# Patient Record
Sex: MALE | Race: BLACK OR AFRICAN AMERICAN | Employment: FULL TIME | URBAN - METROPOLITAN AREA
[De-identification: names, ages, dates, MRNs, and addresses within clinical notes are randomized per-mention and may not be internally consistent; named-entity substitution may affect disease eponyms.]

---

## 2017-09-12 ENCOUNTER — OFFICE VISIT (OUTPATIENT)
Dept: FAMILY MEDICINE CLINIC | Facility: CLINIC | Age: 52
End: 2017-09-12

## 2017-09-12 VITALS
TEMPERATURE: 98 F | HEIGHT: 74 IN | WEIGHT: 296 LBS | RESPIRATION RATE: 18 BRPM | SYSTOLIC BLOOD PRESSURE: 125 MMHG | HEART RATE: 60 BPM | BODY MASS INDEX: 37.99 KG/M2 | DIASTOLIC BLOOD PRESSURE: 80 MMHG

## 2017-09-12 DIAGNOSIS — M54.50 DORSALGIA OF LUMBAR REGION: ICD-10-CM

## 2017-09-12 PROCEDURE — 99213 OFFICE O/P EST LOW 20 MIN: CPT | Performed by: FAMILY MEDICINE

## 2017-09-12 PROCEDURE — 99212 OFFICE O/P EST SF 10 MIN: CPT | Performed by: FAMILY MEDICINE

## 2017-09-12 RX ORDER — CYCLOBENZAPRINE HCL 10 MG
10 TABLET ORAL NIGHTLY
Qty: 15 TABLET | Refills: 0 | Status: SHIPPED | OUTPATIENT
Start: 2017-09-12 | End: 2018-08-13

## 2017-09-12 RX ORDER — HYDROCODONE BITARTRATE AND ACETAMINOPHEN 10; 325 MG/1; MG/1
1 TABLET ORAL EVERY 8 HOURS PRN
Qty: 45 TABLET | Refills: 0 | Status: SHIPPED | OUTPATIENT
Start: 2017-09-12 | End: 2018-12-10

## 2017-09-12 NOTE — PROGRESS NOTES
HPI:    Patient ID: Lynn Holcomb is a 46year old male. Patient is here for follow up for chronic back pains. Pt has been working with Dr Mikaela Taylor and pain clinic for this. The patient is compliant with medications and no side effects.  There are no symptoms. No orders of the defined types were placed in this encounter.       Meds This Visit:  Signed Prescriptions Disp Refills    HYDROcodone-acetaminophen  MG Oral Tab 45 tablet 0      Sig: Take 1 tablet by mouth every 8 (eight) hours as need

## 2017-12-04 ENCOUNTER — OFFICE VISIT (OUTPATIENT)
Dept: FAMILY MEDICINE CLINIC | Facility: CLINIC | Age: 52
End: 2017-12-04

## 2017-12-04 VITALS
RESPIRATION RATE: 20 BRPM | WEIGHT: 299 LBS | BODY MASS INDEX: 38 KG/M2 | SYSTOLIC BLOOD PRESSURE: 157 MMHG | DIASTOLIC BLOOD PRESSURE: 95 MMHG | HEART RATE: 80 BPM | TEMPERATURE: 98 F

## 2017-12-04 DIAGNOSIS — K21.9 GASTROESOPHAGEAL REFLUX DISEASE, ESOPHAGITIS PRESENCE NOT SPECIFIED: ICD-10-CM

## 2017-12-04 DIAGNOSIS — L91.8 SKIN TAGS, MULTIPLE ACQUIRED: ICD-10-CM

## 2017-12-04 DIAGNOSIS — M54.50 DORSALGIA OF LUMBAR REGION: Primary | ICD-10-CM

## 2017-12-04 PROCEDURE — 99212 OFFICE O/P EST SF 10 MIN: CPT | Performed by: FAMILY MEDICINE

## 2017-12-04 PROCEDURE — 99213 OFFICE O/P EST LOW 20 MIN: CPT | Performed by: FAMILY MEDICINE

## 2017-12-04 RX ORDER — OMEPRAZOLE 40 MG/1
40 CAPSULE, DELAYED RELEASE ORAL DAILY
Qty: 90 CAPSULE | Refills: 1 | Status: SHIPPED | OUTPATIENT
Start: 2017-12-04 | End: 2018-08-13

## 2017-12-04 NOTE — PROGRESS NOTES
HPI:    Patient ID: Kesha Shaw is a 46year old male. Patient is here for follow up for chronic medical issues- GERD. The patient is compliant with medications and no side effects.  There are no acute issues and patient is requesting refills on his m orthopedic MD for further evaluation and treatment and a second opinion on ; To call if any significant symptoms.      Gastroesophageal reflux disease, esophagitis presence not specified:  - After discussion, will send to GI for further evaluation and treat

## 2017-12-05 ENCOUNTER — TELEPHONE (OUTPATIENT)
Dept: ADMINISTRATIVE | Age: 52
End: 2017-12-05

## 2017-12-05 NOTE — TELEPHONE ENCOUNTER
Pt dropped off FMLA form for Dr. Phil Brown  @[employer] + [employer]+ Signed release, pt will call for payment. Logged for processing.  NK

## 2017-12-11 NOTE — TELEPHONE ENCOUNTER
Good afternoon Dr. Joyce Cook form pending in [employer]. Patient is requesting intermittent time off, 1-5 days per month for back pain, do you approve? Please advise.     Thanks  Atilio Almonte

## 2017-12-12 NOTE — TELEPHONE ENCOUNTER
Dr. Jeniffer Madden    Please sign off on form:  -Highlight the patient and hit \"Chart\" button. -In Chart Review, w/in the Encounter tab - open the Telephone call encounter for 12-5-17. Scroll down.  -Click \"scan on\" blue Hyperlink under \"Media\" heading for PPD_FMLA_. [id number].  -Click on Acknowledge button at the bottom right corner and left-click onto image, signature stamp appears and drag signature to Provider signature line. Stamp will turn blue. Close window.     Thank you,  Alisa Harrisr

## 2018-02-07 ENCOUNTER — OFFICE VISIT (OUTPATIENT)
Dept: PAIN CLINIC | Facility: HOSPITAL | Age: 53
End: 2018-02-07
Attending: PHYSICIAN ASSISTANT
Payer: COMMERCIAL

## 2018-02-07 VITALS
HEIGHT: 74 IN | BODY MASS INDEX: 37.09 KG/M2 | HEART RATE: 76 BPM | DIASTOLIC BLOOD PRESSURE: 91 MMHG | WEIGHT: 289 LBS | SYSTOLIC BLOOD PRESSURE: 142 MMHG

## 2018-02-07 DIAGNOSIS — M47.816 SPONDYLOSIS OF LUMBAR SPINE: Primary | ICD-10-CM

## 2018-02-07 DIAGNOSIS — M51.36 DDD (DEGENERATIVE DISC DISEASE), LUMBAR: ICD-10-CM

## 2018-02-07 PROCEDURE — 99201 HC OUTPT EVAL AND MGNT NEW PT LEVEL 1: CPT

## 2018-02-07 NOTE — PROGRESS NOTES
PT REPORTS SEVERAL YR HX OF INJURY-15YRS AGO;  WORKING SHIPPING & RECEIVING;  OVER THE YRS  PAIN HAS  HAS GOTTEN WORSE;  ALWAYS HAVING PAIN;  REFERRED BY [facility];  HAS HAD P.T.  IN THE PAST;  PAIN FLARES OVER THE YRS;  ABLE TO TOLERATE 4/10;  PAIN

## 2018-02-07 NOTE — CHRONIC PAIN
[facility]  Pain Consultation     HISTORY OF PRESENT ILLNESS:  Ronnald Canavan is a 48year old old male referred to the pain clinic by Dr. Deann Tracy for Spondylosis of lumbar spine  (primary encounter diagnosis)  DDD (degenerative disc diseas not exacerbate the pain.   Numbness/tingling: as above  Weakness: as above  Weight Loss: Negative   Fever: Negative   Cardiovascular:  No current chest pain or palpitations   Respiratory:  No current shortness of breath   Gastrointestinal:  No active ulcer, normal to touch bilateral upper and lower extremities  Edema - Absent  Sensation (light touch/pinprick/temperature):     Right Lower Extremity:  Normal  Left Lower Extremity: Normal  ROM:                                                               MOTOR mild bilateral neural foraminal narrowing.   L4-L5:   Diffuse disc bulge and facet degenerative changes causing flattening of the ventral thecal sac, mild bilateral lateral recess stenosis and mild bilateral (right greater than left) neural foraminal narrow planned.     Pt will return to clinic for 2 weeks postinjection

## 2018-02-22 ENCOUNTER — OFFICE VISIT (OUTPATIENT)
Dept: PAIN CLINIC | Facility: HOSPITAL | Age: 53
End: 2018-02-22
Attending: ANESTHESIOLOGY
Payer: COMMERCIAL

## 2018-02-22 VITALS — DIASTOLIC BLOOD PRESSURE: 90 MMHG | HEART RATE: 72 BPM | SYSTOLIC BLOOD PRESSURE: 145 MMHG

## 2018-02-22 DIAGNOSIS — G89.29 CHRONIC MIDLINE LOW BACK PAIN WITHOUT SCIATICA: Primary | ICD-10-CM

## 2018-02-22 DIAGNOSIS — M47.817 FACET ARTHRITIS OF LUMBOSACRAL REGION: ICD-10-CM

## 2018-02-22 DIAGNOSIS — M54.50 CHRONIC MIDLINE LOW BACK PAIN WITHOUT SCIATICA: Primary | ICD-10-CM

## 2018-02-22 PROCEDURE — 99211 OFF/OP EST MAY X REQ PHY/QHP: CPT

## 2018-02-22 NOTE — PROGRESS NOTES
Patient presents to [facility] ambulatory for follow up LESI done 2-14 by Dr. Adair Liao. He reports for a few days after the injection he got 30-40% relief but now the pain has returned in full.   He takes norco as needed that he gets from his PCP but now is out, it h

## 2018-02-22 NOTE — CHRONIC PAIN
Follow-up Note  HISTORY OF PRESENT ILLNESS:  Quinton Reid is a 48year old old male, originally referred to the pain clinic by  No ref. provider found, returns to the clinic for follow up. He is s/p LESI with 2-3 days of 20-30% relief.  He continues to HISTORY:  Patient Active Problem List:     Glaucoma suspect of both eyes     Age related cataract     Myopia with astigmatism and presbyopia     Chronic allergic conjunctivitis    Past Medical History:   Diagnosis Date   • Essential hypertension        [facility], [address] [place] [address], 6/18/2012, 12:45. INDICATIONS:  Chronic lower back pain, no known injury, no surgery.      TECHNIQUE:    A variety of imaging planes and parameters were utilized for visualization of suspected pa neural foraminal narrowing. 2. Other levels as described above. [place] PHYSICIAN MONITORING PROGRAM REVIEWED  Yes      ASSESSMENT AND PLAN:    Laxmi Monreal is a 48year old  male, with  Low back pain secondary to DDD and facet arthropathy.  He is wit

## 2018-03-21 ENCOUNTER — OFFICE VISIT (OUTPATIENT)
Dept: PAIN CLINIC | Facility: HOSPITAL | Age: 53
End: 2018-03-21
Attending: ANESTHESIOLOGY
Payer: COMMERCIAL

## 2018-03-21 VITALS — DIASTOLIC BLOOD PRESSURE: 97 MMHG | SYSTOLIC BLOOD PRESSURE: 156 MMHG | HEART RATE: 69 BPM

## 2018-03-21 DIAGNOSIS — M54.50 CHRONIC MIDLINE LOW BACK PAIN WITHOUT SCIATICA: Primary | ICD-10-CM

## 2018-03-21 DIAGNOSIS — G89.29 CHRONIC MIDLINE LOW BACK PAIN WITHOUT SCIATICA: Primary | ICD-10-CM

## 2018-03-21 DIAGNOSIS — M51.36 DDD (DEGENERATIVE DISC DISEASE), LUMBAR: ICD-10-CM

## 2018-03-21 DIAGNOSIS — M47.817 FACET ARTHRITIS OF LUMBOSACRAL REGION: ICD-10-CM

## 2018-03-21 PROCEDURE — 99211 OFF/OP EST MAY X REQ PHY/QHP: CPT | Performed by: NURSE PRACTITIONER

## 2018-03-21 RX ORDER — HYDROCODONE BITARTRATE AND ACETAMINOPHEN 5; 325 MG/1; MG/1
1 TABLET ORAL EVERY 8 HOURS PRN
Qty: 30 TABLET | Refills: 0 | Status: SHIPPED | OUTPATIENT
Start: 2018-03-21 | End: 2018-08-08

## 2018-03-21 NOTE — CHRONIC PAIN
Follow-up Note  HISTORY OF PRESENT ILLNESS:  Shant Muhammad is a 48year old old male, returns to the clinic for follow up. He is s/p median branch blocks bilaterally with 1 day of relief where his pain was a 2 or a 3.   He continues to report low back pain HISTORY:  Patient Active Problem List:     Glaucoma suspect of both eyes     Age related cataract     Myopia with astigmatism and presbyopia     Chronic allergic conjunctivitis     Chronic midline low back pain without sciatica     Facet arthritis of lumbo Absent  TPs: Absent: lumbo-sacral paraspinous muscle TPs  Skin - normal     IMAGING:  PROCEDURE:  MRI SPINE LUMBAR (CPT=72148)     COMPARISON: [facility] [address] [place], 6/18/2012, 12:45.      INDICATIONS:  Chronic lo progressed since prior, most prominent at L4-5 with mild flattening of the ventral thecal sac, mild bilateral lateral recess stenosis and mild bilateral neural foraminal narrowing. 2. Other levels as described above.          [address] [facility]

## 2018-04-04 NOTE — TELEPHONE ENCOUNTER
Patient requesting refill for     AmLODIPine Besylate 5 MG Oral Tab Take 1 tablet (5 mg total) by mouth once daily. Disp: 90 tablet Rfl: 3     Patient is out of medication.      Please advise

## 2018-04-05 RX ORDER — AMLODIPINE BESYLATE 5 MG/1
5 TABLET ORAL
Qty: 90 TABLET | Refills: 3 | Status: SHIPPED | OUTPATIENT
Start: 2018-04-05 | End: 2019-05-12

## 2018-04-05 NOTE — TELEPHONE ENCOUNTER
Message noted: Chart reviewed and may refill medication times one 90 day supply as requested with 3 additional refill. Prescription sent to listed pharmacy. Pharmacy to notify patient.

## 2018-04-05 NOTE — TELEPHONE ENCOUNTER
Requesting Amlodipine refill    Failed IM/FM refill protocol, please advise    Hypertensive Medications  Protocol Criteria:  · Appointment scheduled in the past 6 months or in the next 3 months  · BMP or CMP in the past 12 months  · Creatinine result < 2

## 2018-08-06 ENCOUNTER — TELEPHONE (OUTPATIENT)
Dept: FAMILY MEDICINE CLINIC | Facility: CLINIC | Age: 53
End: 2018-08-06

## 2018-08-06 DIAGNOSIS — M54.5 CHRONIC LOW BACK PAIN, UNSPECIFIED BACK PAIN LATERALITY, WITH SCIATICA PRESENCE UNSPECIFIED: Primary | ICD-10-CM

## 2018-08-06 DIAGNOSIS — G89.29 CHRONIC LOW BACK PAIN, UNSPECIFIED BACK PAIN LATERALITY, WITH SCIATICA PRESENCE UNSPECIFIED: Primary | ICD-10-CM

## 2018-08-06 NOTE — TELEPHONE ENCOUNTER
Referral request noted. Referral approved, generated and sent to [facility].

## 2018-08-08 ENCOUNTER — OFFICE VISIT (OUTPATIENT)
Dept: FAMILY MEDICINE CLINIC | Facility: CLINIC | Age: 53
End: 2018-08-08
Payer: COMMERCIAL

## 2018-08-08 VITALS
TEMPERATURE: 98 F | WEIGHT: 290 LBS | HEIGHT: 74 IN | HEART RATE: 73 BPM | BODY MASS INDEX: 37.22 KG/M2 | SYSTOLIC BLOOD PRESSURE: 147 MMHG | DIASTOLIC BLOOD PRESSURE: 93 MMHG | RESPIRATION RATE: 18 BRPM

## 2018-08-08 DIAGNOSIS — M54.5 CHRONIC LOW BACK PAIN, UNSPECIFIED BACK PAIN LATERALITY, WITH SCIATICA PRESENCE UNSPECIFIED: Primary | ICD-10-CM

## 2018-08-08 DIAGNOSIS — G89.29 CHRONIC LOW BACK PAIN, UNSPECIFIED BACK PAIN LATERALITY, WITH SCIATICA PRESENCE UNSPECIFIED: Primary | ICD-10-CM

## 2018-08-08 PROCEDURE — 99212 OFFICE O/P EST SF 10 MIN: CPT | Performed by: FAMILY MEDICINE

## 2018-08-08 PROCEDURE — 99213 OFFICE O/P EST LOW 20 MIN: CPT | Performed by: FAMILY MEDICINE

## 2018-08-08 RX ORDER — HYDROCODONE BITARTRATE AND ACETAMINOPHEN 5; 325 MG/1; MG/1
1 TABLET ORAL EVERY 8 HOURS PRN
Qty: 30 TABLET | Refills: 0 | Status: ON HOLD | OUTPATIENT
Start: 2018-08-08 | End: 2019-04-26

## 2018-08-08 NOTE — PROGRESS NOTES
HPI:    Patient ID: Carissa Estrada is a 48year old male. Patient is here for follow up for chronic back issues.  There are no acute issues and patient is requesting refills on his norco. The patient states medications have been helpful and effective in types were placed in this encounter. Meds This Visit:  Signed Prescriptions Disp Refills    HYDROcodone-acetaminophen (NORCO) 5-325 MG Oral Tab 30 tablet 0      Sig: Take 1 tablet by mouth every 8 (eight) hours as needed for Pain.            Imaging &

## 2018-08-13 ENCOUNTER — OFFICE VISIT (OUTPATIENT)
Dept: PAIN CLINIC | Facility: HOSPITAL | Age: 53
End: 2018-08-13
Attending: FAMILY MEDICINE
Payer: COMMERCIAL

## 2018-08-13 DIAGNOSIS — M54.50 CHRONIC MIDLINE LOW BACK PAIN WITHOUT SCIATICA: Primary | ICD-10-CM

## 2018-08-13 DIAGNOSIS — G89.29 CHRONIC LOW BACK PAIN, UNSPECIFIED BACK PAIN LATERALITY, WITH SCIATICA PRESENCE UNSPECIFIED: ICD-10-CM

## 2018-08-13 DIAGNOSIS — G89.29 CHRONIC MIDLINE LOW BACK PAIN WITHOUT SCIATICA: Primary | ICD-10-CM

## 2018-08-13 DIAGNOSIS — M54.5 CHRONIC LOW BACK PAIN, UNSPECIFIED BACK PAIN LATERALITY, WITH SCIATICA PRESENCE UNSPECIFIED: ICD-10-CM

## 2018-08-13 PROCEDURE — 99211 OFF/OP EST MAY X REQ PHY/QHP: CPT

## 2018-08-13 NOTE — PROGRESS NOTES
Patient presents to [facility] ambulatory for follow up. He has chronic low back pain with intermittently numbness and tingling in his feet. He had 2 medial branch blocks earlier in 2018 and they helped 80% for 2-3 days.  He has been taking norco prn from dr Roland Reyes

## 2018-08-13 NOTE — CHRONIC PAIN
Follow-up Note  CC: low back pain  HISTORY OF PRESENT ILLNESS:  Karen Gill is a 48year old old male, originally referred to the pain clinic by Dr. Parker Muhammad, with history of Chronic midline low back pain without sciatica  (primary encounter diagnosis)  Yrn Negative  Integumentary :  Negative  Psychiatric:  Negative  Hematologic: No active bleeding  Lymphatic: No current lymphedema  Allergic/Immunologic:  Negative  Musculoskeletal: As above  Neurological: As above  Denies chest pain, shortness of breath.     [place] JVD  Gait: Normal;  cane user - No  Spine: Kyphosis    ROM:   LUMBAR SPINE    Degree Pain   Flexion 90 yes   Extension 30 yes   Left SB 30 Little bit   Right SB 30 Little bit               KNEES    Degree Pain   Right Flexion 120 No   Right Extension 0 No

## 2018-08-14 ENCOUNTER — DOCUMENTATION ONLY (OUTPATIENT)
Dept: PAIN CLINIC | Facility: HOSPITAL | Age: 53
End: 2018-08-14

## 2018-08-15 ENCOUNTER — DOCUMENTATION ONLY (OUTPATIENT)
Dept: PAIN CLINIC | Facility: HOSPITAL | Age: 53
End: 2018-08-15

## 2018-09-18 ENCOUNTER — OFFICE VISIT (OUTPATIENT)
Dept: PAIN CLINIC | Facility: HOSPITAL | Age: 53
End: 2018-09-18
Attending: NURSE PRACTITIONER
Payer: COMMERCIAL

## 2018-09-18 DIAGNOSIS — G89.29 CHRONIC MIDLINE LOW BACK PAIN WITHOUT SCIATICA: Primary | ICD-10-CM

## 2018-09-18 DIAGNOSIS — M47.817 FACET ARTHRITIS OF LUMBOSACRAL REGION: ICD-10-CM

## 2018-09-18 DIAGNOSIS — M54.50 CHRONIC MIDLINE LOW BACK PAIN WITHOUT SCIATICA: Primary | ICD-10-CM

## 2018-09-18 PROCEDURE — 99211 OFF/OP EST MAY X REQ PHY/QHP: CPT

## 2018-09-18 NOTE — CHRONIC PAIN
Follow-up Note  CC: S/P Bilateral RFA rhizotomy 8/30/18  HISTORY OF PRESENT ILLNESS:  Sveta Baer is a 48year old old male, originally referred to the pain clinic by Dr. Elfego Eduardo, with history of Chronic midline low back pain without sciatica  (primary enco shortness of breath   Gastrointestinal:  No active ulcer  Genitourinary:  Negative  Integumentary :  Negative  Psychiatric:  Negative  Hematologic: No active bleeding  Lymphatic: No current lymphedema  Allergic/Immunologic:  Negative  Musculoskeletal: As a Exposure: Not Asked        Hobby Hazards: Not Asked        Sleep Concern: Not Asked        Stress Concern: Not Asked        Weight Concern: Not Asked        Special Diet: Not Asked        Back Care: Not Asked        Exercise: Not Asked        Bike Helmet: HEP      Comprehensive analgesic plan was formulated. Conservative vs. Aggressive measures were discussed at length including pharmacotherapy (eg.  Anti- inflammatories, muscle relaxants, neuropathic medications, oral steroids, analgesics), injections, and

## 2018-10-04 ENCOUNTER — APPOINTMENT (OUTPATIENT)
Dept: PHYSICAL THERAPY | Facility: HOSPITAL | Age: 53
End: 2018-10-04
Attending: NURSE PRACTITIONER
Payer: COMMERCIAL

## 2018-10-08 ENCOUNTER — APPOINTMENT (OUTPATIENT)
Dept: PHYSICAL THERAPY | Facility: HOSPITAL | Age: 53
End: 2018-10-08
Attending: NURSE PRACTITIONER
Payer: COMMERCIAL

## 2018-10-11 ENCOUNTER — APPOINTMENT (OUTPATIENT)
Dept: PHYSICAL THERAPY | Facility: HOSPITAL | Age: 53
End: 2018-10-11
Attending: NURSE PRACTITIONER
Payer: COMMERCIAL

## 2018-10-15 ENCOUNTER — APPOINTMENT (OUTPATIENT)
Dept: PHYSICAL THERAPY | Facility: HOSPITAL | Age: 53
End: 2018-10-15
Attending: NURSE PRACTITIONER
Payer: COMMERCIAL

## 2018-10-18 ENCOUNTER — APPOINTMENT (OUTPATIENT)
Dept: PHYSICAL THERAPY | Facility: HOSPITAL | Age: 53
End: 2018-10-18
Attending: NURSE PRACTITIONER
Payer: COMMERCIAL

## 2018-12-10 ENCOUNTER — OFFICE VISIT (OUTPATIENT)
Dept: FAMILY MEDICINE CLINIC | Facility: CLINIC | Age: 53
End: 2018-12-10
Payer: COMMERCIAL

## 2018-12-10 VITALS
TEMPERATURE: 99 F | BODY MASS INDEX: 38.12 KG/M2 | SYSTOLIC BLOOD PRESSURE: 155 MMHG | HEART RATE: 77 BPM | DIASTOLIC BLOOD PRESSURE: 98 MMHG | HEIGHT: 74 IN | WEIGHT: 297 LBS

## 2018-12-10 DIAGNOSIS — M54.50 DORSALGIA OF LUMBAR REGION: Primary | ICD-10-CM

## 2018-12-10 DIAGNOSIS — Z12.11 COLON CANCER SCREENING: ICD-10-CM

## 2018-12-10 DIAGNOSIS — M51.26 LUMBAR HERNIATED DISC: ICD-10-CM

## 2018-12-10 PROCEDURE — 99212 OFFICE O/P EST SF 10 MIN: CPT | Performed by: FAMILY MEDICINE

## 2018-12-10 PROCEDURE — 99213 OFFICE O/P EST LOW 20 MIN: CPT | Performed by: FAMILY MEDICINE

## 2018-12-10 RX ORDER — HYDROCODONE BITARTRATE AND ACETAMINOPHEN 10; 325 MG/1; MG/1
1 TABLET ORAL EVERY 8 HOURS PRN
Qty: 60 TABLET | Refills: 0 | Status: ON HOLD | OUTPATIENT
Start: 2018-12-10 | End: 2019-04-02

## 2018-12-10 NOTE — PROGRESS NOTES
HPI:    Patient ID: Cony Al is a 48year old male. Patient is here for follow up for recurring back issues. The patient states he has had some flare up for his lower back pains.  There are no acute issues and patient is requesting refills for his Disp Refills   • HYDROcodone-acetaminophen  MG Oral Tab 60 tablet 0     Sig: Take 1 tablet by mouth every 8 (eight) hours as needed for Pain.        Imaging & Referrals:  NEUROSURGERY - INTERNAL  GASTRO - INTERNAL       FR#[id number]

## 2018-12-12 NOTE — H&P
[address] [facility] [address] Gastroenterology                                                                                                  Clinic History and Physical     Pa tobacco: Never Used    Alcohol use:  Yes      Alcohol/week: 0.6 oz      Types: 1 Glasses of wine per week    Drug use: No       Medications (Active prior to today's visit):    Current Outpatient Medications:  HYDROcodone-acetaminophen (NORCO) 5-325 MG Oral movements of all 4 extremities   Psych: Pt has a normal mood and affect, behavior is normal    Nursing note and vitals reviewed      Labs/Imaging:     Patient's labs and imaging were reviewed and discussed with patient today.   See HPI and A&P for further d proceed with colonoscopy with intervention [i.e. polypectomy, stent placement, etc.] as indicated. Orders This Visit:  No orders of the defined types were placed in this encounter.       Meds This Visit:  Requested Prescriptions     Pending Prescriptio

## 2019-01-07 ENCOUNTER — OFFICE VISIT (OUTPATIENT)
Dept: GASTROENTEROLOGY | Facility: CLINIC | Age: 54
End: 2019-01-07
Payer: COMMERCIAL

## 2019-01-07 ENCOUNTER — TELEPHONE (OUTPATIENT)
Dept: GASTROENTEROLOGY | Facility: CLINIC | Age: 54
End: 2019-01-07

## 2019-01-07 VITALS
BODY MASS INDEX: 37.68 KG/M2 | SYSTOLIC BLOOD PRESSURE: 145 MMHG | WEIGHT: 293.63 LBS | HEIGHT: 74 IN | DIASTOLIC BLOOD PRESSURE: 85 MMHG | HEART RATE: 83 BPM

## 2019-01-07 DIAGNOSIS — Z12.11 SCREENING FOR COLON CANCER: Primary | ICD-10-CM

## 2019-01-07 DIAGNOSIS — Z12.11 SCREEN FOR COLON CANCER: Primary | ICD-10-CM

## 2019-01-07 PROCEDURE — S0285 CNSLT BEFORE SCREEN COLONOSC: HCPCS | Performed by: NURSE PRACTITIONER

## 2019-01-07 PROCEDURE — 99212 OFFICE O/P EST SF 10 MIN: CPT | Performed by: NURSE PRACTITIONER

## 2019-01-07 NOTE — TELEPHONE ENCOUNTER
Scheduled for:  Colonoscopy [address]388  Provider Name:   Date:  3/12/19  Location:  [facility]  Sedation:  MAC  Time:  8am arrival 7am  Prep:Colyte   Meds/Allergies Reconciled?:  Cas AIKEN reviewed  Diagnosis with codes: Screen Z12.11   Was patient info

## 2019-01-07 NOTE — PATIENT INSTRUCTIONS
1. Schedule colonoscopy with Dr. Shawna Vega or Dr. James Murillo w/ MAC               2.  bowel prep from pharmacy - split dose Colyte    3. Continue all medications for procedure except: see office visit note    4.  Read all bowel prep instructions carefu

## 2019-01-11 ENCOUNTER — TELEPHONE (OUTPATIENT)
Dept: GASTROENTEROLOGY | Facility: CLINIC | Age: 54
End: 2019-01-11

## 2019-01-11 DIAGNOSIS — Z12.11 COLON CANCER SCREENING: Primary | ICD-10-CM

## 2019-01-11 NOTE — TELEPHONE ENCOUNTER
Spoke to pt and pt still wants to cancel procedure for  3/12/19    I canceled in [facility], sent a Surgical Case Change Request, forwarded to GI Schedulers.     ALEXANDRIA Cadet

## 2019-01-14 NOTE — TELEPHONE ENCOUNTER
Called pt and rescheduled Clonoscopy for 4/2/19     Scheduled for:  Colonoscopy [id number]  Provider Name:  Date:  4/2/19  Location:  [facility]  Sedation:  MAC  Time:  8AM ARRIVAL 7AM  Prep:Colyte  Meds/Allergies Reconciled?:  Vaishnavi Reyes Reviewed  Diagnosis

## 2019-01-14 NOTE — TELEPHONE ENCOUNTER
Pt called and would like to know if his cancelled procedure has been filled?  He would like to reschedule to that date in march if possible please call thank you

## 2019-04-02 ENCOUNTER — ANESTHESIA (OUTPATIENT)
Dept: ENDOSCOPY | Facility: HOSPITAL | Age: 54
End: 2019-04-02
Payer: COMMERCIAL

## 2019-04-02 ENCOUNTER — HOSPITAL ENCOUNTER (OUTPATIENT)
Facility: HOSPITAL | Age: 54
Setting detail: HOSPITAL OUTPATIENT SURGERY
Discharge: HOME | End: 2019-04-02
Attending: INTERNAL MEDICINE | Admitting: INTERNAL MEDICINE
Payer: COMMERCIAL

## 2019-04-02 ENCOUNTER — ANESTHESIA EVENT (OUTPATIENT)
Dept: ENDOSCOPY | Facility: HOSPITAL | Age: 54
End: 2019-04-02
Payer: COMMERCIAL

## 2019-04-02 DIAGNOSIS — Z12.11 COLON CANCER SCREENING: ICD-10-CM

## 2019-04-02 PROCEDURE — 45380 COLONOSCOPY AND BIOPSY: CPT | Performed by: INTERNAL MEDICINE

## 2019-04-02 PROCEDURE — 3E0H8GC INTRODUCTION OF OTHER THERAPEUTIC SUBSTANCE INTO LOWER GI, VIA NATURAL OR ARTIFICIAL OPENING ENDOSCOPIC: ICD-10-PCS | Performed by: INTERNAL MEDICINE

## 2019-04-02 PROCEDURE — 0DBM8ZX EXCISION OF DESCENDING COLON, VIA NATURAL OR ARTIFICIAL OPENING ENDOSCOPIC, DIAGNOSTIC: ICD-10-PCS | Performed by: INTERNAL MEDICINE

## 2019-04-02 PROCEDURE — 45381 COLONOSCOPY SUBMUCOUS NJX: CPT | Performed by: INTERNAL MEDICINE

## 2019-04-02 RX ORDER — SODIUM CHLORIDE, SODIUM LACTATE, POTASSIUM CHLORIDE, CALCIUM CHLORIDE 600; 310; 30; 20 MG/100ML; MG/100ML; MG/100ML; MG/100ML
INJECTION, SOLUTION INTRAVENOUS CONTINUOUS
Status: DISCONTINUED | OUTPATIENT
Start: 2019-04-02 | End: 2019-04-02

## 2019-04-02 RX ORDER — NALOXONE HYDROCHLORIDE 0.4 MG/ML
80 INJECTION, SOLUTION INTRAMUSCULAR; INTRAVENOUS; SUBCUTANEOUS AS NEEDED
Status: CANCELLED | OUTPATIENT
Start: 2019-04-02 | End: 2019-04-02

## 2019-04-02 RX ORDER — SODIUM CHLORIDE, SODIUM LACTATE, POTASSIUM CHLORIDE, CALCIUM CHLORIDE 600; 310; 30; 20 MG/100ML; MG/100ML; MG/100ML; MG/100ML
INJECTION, SOLUTION INTRAVENOUS CONTINUOUS
Status: CANCELLED | OUTPATIENT
Start: 2019-04-02

## 2019-04-02 RX ADMIN — SODIUM CHLORIDE, SODIUM LACTATE, POTASSIUM CHLORIDE, CALCIUM CHLORIDE: 600; 310; 30; 20 INJECTION, SOLUTION INTRAVENOUS at 08:50:00

## 2019-04-02 NOTE — ANESTHESIA POSTPROCEDURE EVALUATION
Patient: Nader Dias    Procedure Summary     Date:  04/02/19 Room / Location:  [facility] ENDOSCOPY 01 / [facility] ENDOSCOPY    Anesthesia Start:  0800 Anesthesia Stop:      Procedure:  COLONOSCOPY (N/A ) Diagnosis:       Colon cancer screening      (sessile  desce

## 2019-04-02 NOTE — H&P
History & Physical Examination    Patient Name: Laxmi Monreal  MRN: [id number]  CSN: [id number]  YOB: 1965    Diagnosis: Colorectal cancer screening        Medications Prior to Admission:  HYDROcodone-acetaminophen ([facility]) 5-325 MG Oral Tab T AM

## 2019-04-02 NOTE — ANESTHESIA PREPROCEDURE EVALUATION
Anesthesia PreOp Note    HPI:     Marcio Scales is a 47year old male who presents for preoperative consultation requested by: Vitaly Hughes MD    Date of Surgery: 4/2/2019    Procedure(s):  COLONOSCOPY  Indication: Colon cancer screening    Relev 04/02/19 0728     No current [facility]-ordered outpatient medications on file.       Dust Mite Extract       UNKNOWN    Family History   Problem Relation Age of Onset   • Hypertension Mother    • Thyroid Disorder Mother    • Diabetes Maternal Aunt    • Glaucoma Special Diet: Not Asked        Back Care: Not Asked        Exercise: Not Asked        Bike Helmet: Not Asked        Seat Belt: Not Asked        Self-Exams: Not Asked    Social History Narrative      Not on file      Available pre-op labs reviewed.

## 2019-04-02 NOTE — OPERATIVE REPORT
[facility] Endoscopy Report      Date of Procedure:  04/02/19      Preoperative Diagnosis:  Colorectal cancer screening      Postoperative Diagnosis:  1. Sessile descending colon polyp with features concerning for malignancy  2.   Large farzana was applied above the more proximal lesion and below the more distal lesion therefore representing #2 endoscopic tattoos in the descending colon. There were no other colonic polyps, mass lesions, vascular anomalies or signs of inflammation seen.   Retrofle

## 2019-04-03 ENCOUNTER — TELEPHONE (OUTPATIENT)
Dept: GASTROENTEROLOGY | Facility: CLINIC | Age: 54
End: 2019-04-03

## 2019-04-03 ENCOUNTER — OFFICE VISIT (OUTPATIENT)
Dept: GASTROENTEROLOGY | Facility: CLINIC | Age: 54
End: 2019-04-03
Payer: COMMERCIAL

## 2019-04-03 VITALS
SYSTOLIC BLOOD PRESSURE: 145 MMHG | HEART RATE: 81 BPM | WEIGHT: 283 LBS | BODY MASS INDEX: 36.32 KG/M2 | HEIGHT: 74 IN | DIASTOLIC BLOOD PRESSURE: 85 MMHG

## 2019-04-03 DIAGNOSIS — C18.6 CANCER OF DESCENDING COLON (HCC): Primary | ICD-10-CM

## 2019-04-03 PROCEDURE — 99213 OFFICE O/P EST LOW 20 MIN: CPT | Performed by: INTERNAL MEDICINE

## 2019-04-03 PROCEDURE — 99212 OFFICE O/P EST SF 10 MIN: CPT | Performed by: INTERNAL MEDICINE

## 2019-04-03 NOTE — TELEPHONE ENCOUNTER
Dr Israel Woods called from [facility]--would like to see pt in office today at 1:45pm to discuss results. Pt contacted, accepted appt and given directions to [facility]. Radha Morales added to schedule.

## 2019-04-03 NOTE — PATIENT INSTRUCTIONS
1.  Schedule CT scan of the chest, abdomen and pelvis  2. Obtain blood work  3. I will speak to Dr. Temitope Chappell about a referral to a surgeon.

## 2019-04-03 NOTE — PROGRESS NOTES
Consult note reviewed and noted. Also discussed case with Dr Rik Sainz. Will have pt see Dr Collazo [facility] - general surgery.

## 2019-04-03 NOTE — PROGRESS NOTES
HPI:    Patient ID: Carissa Estrada is a 47year old male. HPI  Celena Edgar returns in follow-up. He underwent a screening colonoscopy yesterday which revealed #2 polyps in the descending colon.   #1 was 1.8 cm in size and sessile, the other large and pedunc normal mood and affect. His behavior is normal.              ASSESSMENT/PLAN:   Cancer of descending colon ([facility])  (primary encounter diagnosis)  I have apprised the patient of the diagnosis of a cancer of the descending colon.   This finding was made on a s

## 2019-04-03 NOTE — TELEPHONE ENCOUNTER
Pt procedure/Testin/58236 (CT CHEST+ABDOMEN+PELVIS(ALL W+WO)  Pt insurance/number to contact:  [employer] ID# and HROCE:[id number]  Dx:Cancer of descending colon [facility]) ----- C18.6  SAF:[id number]   Indication for test: CA

## 2019-04-04 ENCOUNTER — TELEPHONE (OUTPATIENT)
Dept: GASTROENTEROLOGY | Facility: CLINIC | Age: 54
End: 2019-04-04

## 2019-04-04 VITALS
HEART RATE: 66 BPM | WEIGHT: 280 LBS | SYSTOLIC BLOOD PRESSURE: 145 MMHG | DIASTOLIC BLOOD PRESSURE: 86 MMHG | BODY MASS INDEX: 35.94 KG/M2 | HEIGHT: 74 IN | RESPIRATION RATE: 15 BRPM | OXYGEN SATURATION: 97 %

## 2019-04-04 NOTE — TELEPHONE ENCOUNTER
----- Message from Christopher Shell MD sent at 4/3/2019  8:21 PM CDT -----  Please see 4/3/19 office visit encounter  I have informed the patient of the diagnosis of a descending colon cancer. Treatment plan as outlined.     GI RN staff: Please enter c

## 2019-04-04 NOTE — TELEPHONE ENCOUNTER
I contacted [facility] and spoke to the reviewer. The CT scan of the chest, abdomen and pelvis has been authorized. Authorization #[id number] valid from 4/3/19-5/2/19. GI RN staff: Please contact the patient. The imaging has been authorized.   I also spoke to

## 2019-04-04 NOTE — TELEPHONE ENCOUNTER
Patient contacted and notified that CT scan has been approved from 4/3/19-5/2/19. Patient stated he already scheduled appt.      Also I notified him Dr. Negrita Reynoso spoke to Dr. Yesenia Saez for surgical referral, he should call [phone] to see either Dr. Reed Segura or Dr. Emma Caro

## 2019-04-09 ENCOUNTER — OFFICE VISIT (OUTPATIENT)
Dept: SURGERY | Facility: CLINIC | Age: 54
End: 2019-04-09
Payer: COMMERCIAL

## 2019-04-09 ENCOUNTER — TELEPHONE (OUTPATIENT)
Dept: SURGERY | Facility: CLINIC | Age: 54
End: 2019-04-09

## 2019-04-09 VITALS — HEIGHT: 74 IN | BODY MASS INDEX: 36.57 KG/M2 | WEIGHT: 285 LBS

## 2019-04-09 DIAGNOSIS — C18.6 MALIGNANT NEOPLASM OF DESCENDING COLON (HCC): Primary | ICD-10-CM

## 2019-04-09 PROCEDURE — 99212 OFFICE O/P EST SF 10 MIN: CPT | Performed by: SURGERY

## 2019-04-09 PROCEDURE — 99204 OFFICE O/P NEW MOD 45 MIN: CPT | Performed by: SURGERY

## 2019-04-09 NOTE — H&P (VIEW-ONLY)
HPI:    Patient ID: Jimmy Dobbs is a 47year old male presenting with Patient presents with:  Colon Cancer: Pt states he went for his 1st colon screening. Galileo Talamantes     HPI    Past Medical History:   Diagnosis Date   • Back problem    • Essential hypertension on file        Emotionally abused: Not on file        Physically abused: Not on file        Forced sexual activity: Not on file    Other Topics      Concerns:         Service: Not Asked        Blood Transfusions: Not Asked        Caffeine Concern: intact distal pulses. Pulmonary/Chest: Effort normal and breath sounds normal.   Abdominal: Soft. Normal appearance. He exhibits no distension and no mass. There is no tenderness. There is no rebound and no guarding.    Musculoskeletal: Normal range of m

## 2019-04-09 NOTE — H&P
HPI:    Patient ID: Erickson Sapp is a 47year old male presenting with Patient presents with:  Colon Cancer: Pt states he went for his 1st colon screening. [place]     HPI    Past Medical History:   Diagnosis Date   • Back problem    • Essential hypertension on file        Emotionally abused: Not on file        Physically abused: Not on file        Forced sexual activity: Not on file    Other Topics      Concerns:         Service: Not Asked        Blood Transfusions: Not Asked        Caffeine Concern: intact distal pulses. Pulmonary/Chest: Effort normal and breath sounds normal.   Abdominal: Soft. Normal appearance. He exhibits no distension and no mass. There is no tenderness. There is no rebound and no guarding.    Musculoskeletal: Normal range of m

## 2019-04-10 ENCOUNTER — LAB ENCOUNTER (OUTPATIENT)
Dept: LAB | Facility: HOSPITAL | Age: 54
End: 2019-04-10
Attending: SURGERY
Payer: COMMERCIAL

## 2019-04-10 DIAGNOSIS — Z01.818 PREOP TESTING: ICD-10-CM

## 2019-04-10 DIAGNOSIS — C18.6 CANCER OF DESCENDING COLON (HCC): ICD-10-CM

## 2019-04-10 PROCEDURE — 80053 COMPREHEN METABOLIC PANEL: CPT

## 2019-04-10 PROCEDURE — 86901 BLOOD TYPING SEROLOGIC RH(D): CPT

## 2019-04-10 PROCEDURE — 82378 CARCINOEMBRYONIC ANTIGEN: CPT

## 2019-04-10 PROCEDURE — 86900 BLOOD TYPING SEROLOGIC ABO: CPT

## 2019-04-10 PROCEDURE — 86850 RBC ANTIBODY SCREEN: CPT

## 2019-04-10 PROCEDURE — 85025 COMPLETE CBC W/AUTO DIFF WBC: CPT

## 2019-04-10 PROCEDURE — 36415 COLL VENOUS BLD VENIPUNCTURE: CPT

## 2019-04-11 ENCOUNTER — HOSPITAL ENCOUNTER (OUTPATIENT)
Dept: CT IMAGING | Facility: HOSPITAL | Age: 54
Discharge: HOME | End: 2019-04-11
Attending: INTERNAL MEDICINE
Payer: COMMERCIAL

## 2019-04-11 DIAGNOSIS — C18.6 CANCER OF DESCENDING COLON (HCC): ICD-10-CM

## 2019-04-11 PROCEDURE — 74177 CT ABD & PELVIS W/CONTRAST: CPT | Performed by: INTERNAL MEDICINE

## 2019-04-11 PROCEDURE — 71260 CT THORAX DX C+: CPT | Performed by: INTERNAL MEDICINE

## 2019-04-12 ENCOUNTER — TELEPHONE (OUTPATIENT)
Dept: SURGERY | Facility: CLINIC | Age: 54
End: 2019-04-12

## 2019-04-12 ENCOUNTER — HOSPITAL ENCOUNTER (OUTPATIENT)
Dept: MRI IMAGING | Facility: HOSPITAL | Age: 54
Discharge: HOME | End: 2019-04-12
Attending: SURGERY
Payer: COMMERCIAL

## 2019-04-12 DIAGNOSIS — K63.89 HEPATIC FLEXURE MASS: ICD-10-CM

## 2019-04-12 DIAGNOSIS — K63.89 HEPATIC FLEXURE MASS: Primary | ICD-10-CM

## 2019-04-12 PROCEDURE — A9575 INJ GADOTERATE MEGLUMI 0.1ML: HCPCS | Performed by: SURGERY

## 2019-04-12 PROCEDURE — 74183 MRI ABD W/O CNTR FLWD CNTR: CPT | Performed by: SURGERY

## 2019-04-12 NOTE — TELEPHONE ENCOUNTER
Per Dr. Kimberly Cortez, STAT MRI ordered Abdominal/Liver with and without contrast for 2 liver lesions found on CT. Order placed, MRI ordered through [employer]. Patient called, instructions given with verbal understanding received.

## 2019-04-19 ENCOUNTER — HOSPITAL ENCOUNTER (OUTPATIENT)
Dept: NUCLEAR MEDICINE | Facility: HOSPITAL | Age: 54
Discharge: HOME | End: 2019-04-19
Attending: INTERNAL MEDICINE
Payer: COMMERCIAL

## 2019-04-19 DIAGNOSIS — C18.9 MALIGNANT NEOPLASM OF COLON, UNSPECIFIED PART OF COLON (HCC): ICD-10-CM

## 2019-04-19 DIAGNOSIS — K76.9 LESION OF LIVER: ICD-10-CM

## 2019-04-19 PROCEDURE — 78815 PET IMAGE W/CT SKULL-THIGH: CPT | Performed by: INTERNAL MEDICINE

## 2019-04-19 PROCEDURE — 82962 GLUCOSE BLOOD TEST: CPT

## 2019-04-23 ENCOUNTER — TELEPHONE (OUTPATIENT)
Dept: SURGERY | Facility: CLINIC | Age: 54
End: 2019-04-23

## 2019-04-23 ENCOUNTER — OFFICE VISIT (OUTPATIENT)
Dept: HEMATOLOGY/ONCOLOGY | Facility: HOSPITAL | Age: 54
End: 2019-04-23
Attending: INTERNAL MEDICINE
Payer: COMMERCIAL

## 2019-04-23 VITALS
SYSTOLIC BLOOD PRESSURE: 175 MMHG | HEART RATE: 79 BPM | DIASTOLIC BLOOD PRESSURE: 97 MMHG | OXYGEN SATURATION: 98 % | BODY MASS INDEX: 35.94 KG/M2 | WEIGHT: 280 LBS | RESPIRATION RATE: 18 BRPM | TEMPERATURE: 99 F | HEIGHT: 74 IN

## 2019-04-23 DIAGNOSIS — C18.9 MALIGNANT NEOPLASM OF COLON, UNSPECIFIED PART OF COLON (HCC): Primary | ICD-10-CM

## 2019-04-23 DIAGNOSIS — Z51.11 ENCOUNTER FOR CHEMOTHERAPY MANAGEMENT: Primary | ICD-10-CM

## 2019-04-23 DIAGNOSIS — C18.6 MALIGNANT NEOPLASM OF DESCENDING COLON (HCC): ICD-10-CM

## 2019-04-23 PROCEDURE — 99245 OFF/OP CONSLTJ NEW/EST HI 55: CPT | Performed by: INTERNAL MEDICINE

## 2019-04-23 RX ORDER — HEPARIN SODIUM (PORCINE) LOCK FLUSH IV SOLN 100 UNIT/ML 100 UNIT/ML
5 SOLUTION INTRAVENOUS ONCE
Status: CANCELLED | OUTPATIENT
Start: 2019-04-29

## 2019-04-23 RX ORDER — 0.9 % SODIUM CHLORIDE 0.9 %
10 VIAL (ML) INJECTION ONCE
Status: CANCELLED | OUTPATIENT
Start: 2019-04-29

## 2019-04-23 NOTE — PROGRESS NOTES
Cancer Center Consultation Note    Patient Name: Jazz Sylvester   YOB: 1965   Medical Record Number: [id number]   CSN: [id number]   Consulting Physician: Reymundo Pennington MD  Referring Physician(s): Zane Castillo  Date of Consultation: 4/23/2019 recommended to correlate with current colonoscopy findings. Patient continues to feel well without any symptoms of disease or systemic signs of illness.   Denies any bleeding or bruising symptoms, no chest pain, no dyspnea, no nausea, vomiting abdominal pa occassions      Drug use: No      Sexual activity: Not on file    Lifestyle      Physical activity:        Days per week: Not on file        Minutes per session: Not on file      Stress: Not on file    Relationships      Social connections:        Talks on chills, fatigue, fevers, night sweats and weight loss. Eyes: Negative for visual disturbance, irritation and redness. Respiratory: Negative for cough, hemoptysis, chest pain, or dyspnea on exertion.   Gastrointestinal: Negative for dysphagia, odynophagia, HCT 42.2 04/10/2019 01:44 PM    MCV 87.0 04/10/2019 01:44 PM    MCH 26.8 04/10/2019 01:44 PM    MCHC 30.8 (L) 04/10/2019 01:44 PM    RDW 13.2 04/10/2019 01:44 PM    NEPRELIM 2.94 04/10/2019 01:44 PM    .0 04/10/2019 01:44 PM       Lab Results   Comp · Adenocarcinoma, moderately differentiated.          Impression:    (C18.6) Malignant neoplasm of descending colon [facility])    47year old M with no prior contributing past medical history and maternal aunt affected by pancreatic cancer presents for evaluat Oncology Group  [address]   [address] [facility]

## 2019-04-23 NOTE — PROGRESS NOTES
Met with patient. Contacted Dr Swapnil Elizabeth office to arrange port placement for Friday 4/26/19. Port information and CADD pump information described briefly at visit today. Chemo Ed with PA on Monday 4/29/19.

## 2019-04-25 ENCOUNTER — LAB ENCOUNTER (OUTPATIENT)
Dept: LAB | Facility: HOSPITAL | Age: 54
End: 2019-04-25
Attending: INTERNAL MEDICINE
Payer: COMMERCIAL

## 2019-04-25 DIAGNOSIS — Z79.899 PATIENT ON ANTINEOPLASTIC CHEMOTHERAPY REGIMEN: ICD-10-CM

## 2019-04-25 PROCEDURE — 81003 URINALYSIS AUTO W/O SCOPE: CPT

## 2019-04-25 PROCEDURE — 85025 COMPLETE CBC W/AUTO DIFF WBC: CPT

## 2019-04-25 PROCEDURE — 80053 COMPREHEN METABOLIC PANEL: CPT

## 2019-04-25 PROCEDURE — 36415 COLL VENOUS BLD VENIPUNCTURE: CPT

## 2019-04-26 ENCOUNTER — HOSPITAL ENCOUNTER (OUTPATIENT)
Facility: HOSPITAL | Age: 54
Setting detail: HOSPITAL OUTPATIENT SURGERY
Discharge: HOME | End: 2019-04-26
Attending: SURGERY | Admitting: SURGERY
Payer: COMMERCIAL

## 2019-04-26 ENCOUNTER — ANESTHESIA (OUTPATIENT)
Dept: SURGERY | Facility: HOSPITAL | Age: 54
End: 2019-04-26
Payer: COMMERCIAL

## 2019-04-26 ENCOUNTER — ANESTHESIA EVENT (OUTPATIENT)
Dept: SURGERY | Facility: HOSPITAL | Age: 54
End: 2019-04-26
Payer: COMMERCIAL

## 2019-04-26 ENCOUNTER — APPOINTMENT (OUTPATIENT)
Dept: GENERAL RADIOLOGY | Facility: HOSPITAL | Age: 54
End: 2019-04-26
Attending: SURGERY
Payer: COMMERCIAL

## 2019-04-26 VITALS
DIASTOLIC BLOOD PRESSURE: 76 MMHG | HEART RATE: 58 BPM | RESPIRATION RATE: 16 BRPM | WEIGHT: 273 LBS | SYSTOLIC BLOOD PRESSURE: 133 MMHG | BODY MASS INDEX: 35.04 KG/M2 | HEIGHT: 74 IN | TEMPERATURE: 98 F | OXYGEN SATURATION: 98 %

## 2019-04-26 DIAGNOSIS — C18.9 MALIGNANT NEOPLASM OF COLON, UNSPECIFIED PART OF COLON (HCC): ICD-10-CM

## 2019-04-26 PROCEDURE — B5181ZA FLUOROSCOPY OF SUPERIOR VENA CAVA USING LOW OSMOLAR CONTRAST, GUIDANCE: ICD-10-PCS | Performed by: SURGERY

## 2019-04-26 PROCEDURE — 77001 FLUOROGUIDE FOR VEIN DEVICE: CPT | Performed by: SURGERY

## 2019-04-26 PROCEDURE — 0JH63WZ INSERTION OF TOTALLY IMPLANTABLE VASCULAR ACCESS DEVICE INTO CHEST SUBCUTANEOUS TISSUE AND FASCIA, PERCUTANEOUS APPROACH: ICD-10-PCS | Performed by: SURGERY

## 2019-04-26 PROCEDURE — 36561 INSERT TUNNELED CV CATH: CPT | Performed by: SURGERY

## 2019-04-26 PROCEDURE — 02HV33Z INSERTION OF INFUSION DEVICE INTO SUPERIOR VENA CAVA, PERCUTANEOUS APPROACH: ICD-10-PCS | Performed by: SURGERY

## 2019-04-26 DEVICE — POWERPORT ISP M.R.I. IMPLANTABLE PORT WITH ATTACHABLE 8F CHRONOFLEX OPEN-ENDED SINGLE-LUMEN VENOUS CATHETER INTERMEDIATE KIT (WITH SUTURE-PLUGS)
Type: IMPLANTABLE DEVICE | Site: CHEST | Status: FUNCTIONAL
Brand: POWERPORT M.R.I., CHRONOFLEX

## 2019-04-26 RX ORDER — SODIUM CHLORIDE, SODIUM LACTATE, POTASSIUM CHLORIDE, CALCIUM CHLORIDE 600; 310; 30; 20 MG/100ML; MG/100ML; MG/100ML; MG/100ML
INJECTION, SOLUTION INTRAVENOUS CONTINUOUS
Status: DISCONTINUED | OUTPATIENT
Start: 2019-04-26 | End: 2019-04-26

## 2019-04-26 RX ORDER — HYDROMORPHONE HYDROCHLORIDE 1 MG/ML
0.6 INJECTION, SOLUTION INTRAMUSCULAR; INTRAVENOUS; SUBCUTANEOUS EVERY 5 MIN PRN
Status: DISCONTINUED | OUTPATIENT
Start: 2019-04-26 | End: 2019-04-26

## 2019-04-26 RX ORDER — ACETAMINOPHEN 500 MG
1000 TABLET ORAL ONCE
Status: COMPLETED | OUTPATIENT
Start: 2019-04-26 | End: 2019-04-26

## 2019-04-26 RX ORDER — HYDROCODONE BITARTRATE AND ACETAMINOPHEN 5; 325 MG/1; MG/1
1 TABLET ORAL EVERY 4 HOURS PRN
Qty: 30 TABLET | Refills: 0 | Status: SHIPPED | OUTPATIENT
Start: 2019-04-26 | End: 2019-11-22

## 2019-04-26 RX ORDER — BUPIVACAINE HYDROCHLORIDE 5 MG/ML
INJECTION, SOLUTION EPIDURAL; INTRACAUDAL AS NEEDED
Status: DISCONTINUED | OUTPATIENT
Start: 2019-04-26 | End: 2019-04-26 | Stop reason: HOSPADM

## 2019-04-26 RX ORDER — ONDANSETRON 2 MG/ML
4 INJECTION INTRAMUSCULAR; INTRAVENOUS ONCE AS NEEDED
Status: DISCONTINUED | OUTPATIENT
Start: 2019-04-26 | End: 2019-04-26

## 2019-04-26 RX ORDER — MORPHINE SULFATE 2 MG/ML
2 INJECTION, SOLUTION INTRAMUSCULAR; INTRAVENOUS EVERY 10 MIN PRN
Status: DISCONTINUED | OUTPATIENT
Start: 2019-04-26 | End: 2019-04-26

## 2019-04-26 RX ORDER — MORPHINE SULFATE 10 MG/ML
6 INJECTION, SOLUTION INTRAMUSCULAR; INTRAVENOUS EVERY 10 MIN PRN
Status: DISCONTINUED | OUTPATIENT
Start: 2019-04-26 | End: 2019-04-26

## 2019-04-26 RX ORDER — HYDROCODONE BITARTRATE AND ACETAMINOPHEN 5; 325 MG/1; MG/1
1 TABLET ORAL AS NEEDED
Status: DISCONTINUED | OUTPATIENT
Start: 2019-04-26 | End: 2019-04-26

## 2019-04-26 RX ORDER — HYDROMORPHONE HYDROCHLORIDE 1 MG/ML
0.2 INJECTION, SOLUTION INTRAMUSCULAR; INTRAVENOUS; SUBCUTANEOUS EVERY 5 MIN PRN
Status: DISCONTINUED | OUTPATIENT
Start: 2019-04-26 | End: 2019-04-26

## 2019-04-26 RX ORDER — NALOXONE HYDROCHLORIDE 0.4 MG/ML
80 INJECTION, SOLUTION INTRAMUSCULAR; INTRAVENOUS; SUBCUTANEOUS AS NEEDED
Status: DISCONTINUED | OUTPATIENT
Start: 2019-04-26 | End: 2019-04-26

## 2019-04-26 RX ORDER — FAMOTIDINE 20 MG/1
20 TABLET ORAL ONCE
Status: COMPLETED | OUTPATIENT
Start: 2019-04-26 | End: 2019-04-26

## 2019-04-26 RX ORDER — MORPHINE SULFATE 4 MG/ML
4 INJECTION, SOLUTION INTRAMUSCULAR; INTRAVENOUS EVERY 10 MIN PRN
Status: DISCONTINUED | OUTPATIENT
Start: 2019-04-26 | End: 2019-04-26

## 2019-04-26 RX ORDER — METOCLOPRAMIDE 10 MG/1
10 TABLET ORAL ONCE
Status: COMPLETED | OUTPATIENT
Start: 2019-04-26 | End: 2019-04-26

## 2019-04-26 RX ORDER — MIDAZOLAM HYDROCHLORIDE 1 MG/ML
INJECTION INTRAMUSCULAR; INTRAVENOUS AS NEEDED
Status: DISCONTINUED | OUTPATIENT
Start: 2019-04-26 | End: 2019-04-26 | Stop reason: SURG

## 2019-04-26 RX ORDER — HYDROCODONE BITARTRATE AND ACETAMINOPHEN 5; 325 MG/1; MG/1
2 TABLET ORAL AS NEEDED
Status: DISCONTINUED | OUTPATIENT
Start: 2019-04-26 | End: 2019-04-26

## 2019-04-26 RX ORDER — HYDROMORPHONE HYDROCHLORIDE 1 MG/ML
0.4 INJECTION, SOLUTION INTRAMUSCULAR; INTRAVENOUS; SUBCUTANEOUS EVERY 5 MIN PRN
Status: DISCONTINUED | OUTPATIENT
Start: 2019-04-26 | End: 2019-04-26

## 2019-04-26 RX ORDER — HALOPERIDOL 5 MG/ML
0.25 INJECTION INTRAMUSCULAR ONCE AS NEEDED
Status: DISCONTINUED | OUTPATIENT
Start: 2019-04-26 | End: 2019-04-26

## 2019-04-26 RX ORDER — POLYETHYLENE GLYCOL 3350 17 G/17G
17 POWDER, FOR SOLUTION ORAL DAILY
Qty: 14 PACKET | Refills: 0 | Status: SHIPPED | OUTPATIENT
Start: 2019-04-26 | End: 2019-05-10

## 2019-04-26 RX ADMIN — MIDAZOLAM HYDROCHLORIDE 2 MG: 1 INJECTION INTRAMUSCULAR; INTRAVENOUS at 12:13:00

## 2019-04-26 RX ADMIN — SODIUM CHLORIDE, SODIUM LACTATE, POTASSIUM CHLORIDE, CALCIUM CHLORIDE: 600; 310; 30; 20 INJECTION, SOLUTION INTRAVENOUS at 13:20:00

## 2019-04-26 NOTE — INTERVAL H&P NOTE
Pre-op Diagnosis: Malignant neoplasm of colon, unspecified part of colon ([facility] [address] [C18.9]    The above referenced H&P was reviewed by Grzegorz Milner MD on 4/26/2019, the patient was examined and no significant changes have occurred in the patient's condition since

## 2019-04-26 NOTE — ANESTHESIA PREPROCEDURE EVALUATION
Anesthesia PreOp Note    HPI:     Theresa Castillo is a 47year old male who presents for preoperative consultation requested by: Rad Vincent MD    Date of Surgery: 4/26/2019    Procedure(s):  CATHETER INSERTION PORT-A-CATH  Indication: Malignant neoplasm of Elli Almanza MD Last Rate: 20 mL/hr at 04/26/19 1015   ceFAZolin Sodium (ANCEF) 3 g in sodium chloride 0.9% 100 mL IVPB 3 g Intravenous Once Elli Almanza MD      No current Epic-ordered outpatient medications on file.       Dust Mite Extract       UNKNOWN    Fa abused: Not on file        Physically abused: Not on file        Forced sexual activity: Not on file    Other Topics      Concerns:         Service: Not Asked        Blood Transfusions: Not Asked        Caffeine Concern: Yes          coffee, soda-3 (6' 2\") 1.88 m (6' 2\")         Anesthesia ROS/Med Hx and Physical Exam     Patient summary reviewed and Nursing notes reviewed    Airway   Mallampati: II  TM distance: >3 FB  Neck ROM: full  Dental      Pulmonary - normal exam    breath sounds clear to a

## 2019-04-26 NOTE — ANESTHESIA POSTPROCEDURE EVALUATION
Patient: Miles Nieto    Procedure Summary     Date:  04/26/19 Room / Location:  [facility] OR 06 / [facility] OR    Anesthesia Start:  0939 Anesthesia Stop:  5452    Procedure:  CATHETER INSERTION PORT-A-CATH (Right ) Diagnosis:       Malignant neoplasm of

## 2019-04-26 NOTE — OPERATIVE REPORT
Operative Report    Patient Name:  Michael Ron  MR:  [id number]  :  1965  DOS:  19    Preop Dx: Malignant neoplasm of colon, unspecified part of colon ([facility] [address] [C18.9]  Postop Dx:   Malignant neoplasm of colon, unspecified part of colon ([facility] [address] Sterile dressings were applied. All instrument and sponge counts were correct. I was present during the critical portions of the procedure.         Nilesh Marrero MD

## 2019-04-29 ENCOUNTER — APPOINTMENT (OUTPATIENT)
Dept: HEMATOLOGY/ONCOLOGY | Facility: HOSPITAL | Age: 54
End: 2019-04-29
Attending: INTERNAL MEDICINE
Payer: COMMERCIAL

## 2019-04-29 ENCOUNTER — OFFICE VISIT (OUTPATIENT)
Dept: HEMATOLOGY/ONCOLOGY | Facility: HOSPITAL | Age: 54
End: 2019-04-29
Attending: PHYSICIAN ASSISTANT
Payer: COMMERCIAL

## 2019-04-29 DIAGNOSIS — C78.7 COLON CANCER METASTASIZED TO LIVER (HCC): Primary | ICD-10-CM

## 2019-04-29 DIAGNOSIS — C18.6 MALIGNANT NEOPLASM OF DESCENDING COLON (HCC): ICD-10-CM

## 2019-04-29 DIAGNOSIS — C18.9 COLON CANCER METASTASIZED TO LIVER (HCC): Primary | ICD-10-CM

## 2019-04-29 PROCEDURE — 99215 OFFICE O/P EST HI 40 MIN: CPT | Performed by: PHYSICIAN ASSISTANT

## 2019-04-29 RX ORDER — ONDANSETRON HYDROCHLORIDE 8 MG/1
8 TABLET, FILM COATED ORAL EVERY 8 HOURS PRN
Qty: 30 TABLET | Refills: 3 | Status: SHIPPED | OUTPATIENT
Start: 2019-04-29 | End: 2020-01-20

## 2019-04-29 RX ORDER — PROCHLORPERAZINE MALEATE 10 MG
10 TABLET ORAL EVERY 6 HOURS PRN
Qty: 60 TABLET | Refills: 3 | Status: ON HOLD | OUTPATIENT
Start: 2019-04-29 | End: 2020-01-09

## 2019-04-29 NOTE — PATIENT INSTRUCTIONS
Medication Education Record: IV Therapy    Learner:  Patient, Spouse and Family Member    Barriers / Limitations:  None    Psychosocial Assessment:  patient psychosocial response appropriate    Diagnosis:   Colon cancer    IV Cancer Treatment Name(s): Oxal be taken to prevent and minimize this from occurring.     Recommended Anti-nausea medications (as directed by your provider):  Prochloperazine (Compazine) 10 mg every 6 hours and Ondansetron (Zofran) 8 mg every 8 hours  Take as needed    Helpful hints jayce Milk of Magnesia or MiraLAX  o If you have persistent diarrhea or constipation, please contact the triage nurse for further instructions.   Skin Care  o Avoid direct sunlight.  o Wear a broad-spectrum sunscreen with an SPF of 30 or higher on any skin expose Sheet     Please refer to the following link if you are interested in additional information about chemotherapy for yourself or family members: [email or web address].[email or web address] html        Safety and Handling of Chemotherapy  While you or your in the infusion center. Please make appropriate arrangements. 2. Hugging and kissing is safe for you and your partner or family members. You can visit, sit with, hug and kiss the children in your life.     3. You can be around pregnant women, though (if

## 2019-04-29 NOTE — PROGRESS NOTES
Medication Education Record: IV Therapy    Learner:  Patient, Spouse and Family Member    Barriers / Limitations:  None    Psychosocial Assessment:  patient psychosocial response appropriate    Diagnosis:   Colon cancer    IV Cancer Treatment Name(s): Oxal be taken to prevent and minimize this from occurring.     Recommended Anti-nausea medications (as directed by your provider):  Prochloperazine (Compazine) 10 mg every 6 hours and Ondansetron (Zofran) 8 mg every 8 hours  Take as needed    Helpful hints jayce Milk of Magnesia or MiraLAX  o If you have persistent diarrhea or constipation, please contact the triage nurse for further instructions.   Skin Care  o Avoid direct sunlight.  o Wear a broad-spectrum sunscreen with an SPF of 30 or higher on any skin expose Sheet     Please refer to the following link if you are interested in additional information about chemotherapy for yourself or family members: [email or web address] [address] and the nature of this treatment environment, children are not permitted in the infusion center. Please make appropriate arrangements. 2. Hugging and kissing is safe for you and your partner or family members.   You can visit, sit with, hug and kiss the

## 2019-05-07 ENCOUNTER — APPOINTMENT (OUTPATIENT)
Dept: HEMATOLOGY/ONCOLOGY | Facility: HOSPITAL | Age: 54
End: 2019-05-07
Attending: INTERNAL MEDICINE
Payer: COMMERCIAL

## 2019-05-08 ENCOUNTER — OFFICE VISIT (OUTPATIENT)
Dept: FAMILY MEDICINE CLINIC | Facility: CLINIC | Age: 54
End: 2019-05-08
Payer: COMMERCIAL

## 2019-05-08 ENCOUNTER — APPOINTMENT (OUTPATIENT)
Dept: HEMATOLOGY/ONCOLOGY | Facility: HOSPITAL | Age: 54
End: 2019-05-08
Attending: INTERNAL MEDICINE
Payer: COMMERCIAL

## 2019-05-08 VITALS
HEART RATE: 96 BPM | BODY MASS INDEX: 35.81 KG/M2 | WEIGHT: 279 LBS | HEIGHT: 74 IN | TEMPERATURE: 99 F | DIASTOLIC BLOOD PRESSURE: 87 MMHG | RESPIRATION RATE: 20 BRPM | SYSTOLIC BLOOD PRESSURE: 138 MMHG

## 2019-05-08 DIAGNOSIS — J06.9 ACUTE URI: ICD-10-CM

## 2019-05-08 DIAGNOSIS — R05.9 COUGH: ICD-10-CM

## 2019-05-08 PROCEDURE — 99213 OFFICE O/P EST LOW 20 MIN: CPT | Performed by: FAMILY MEDICINE

## 2019-05-08 PROCEDURE — 99212 OFFICE O/P EST SF 10 MIN: CPT | Performed by: FAMILY MEDICINE

## 2019-05-08 RX ORDER — AZITHROMYCIN 250 MG/1
TABLET, FILM COATED ORAL
Qty: 6 TABLET | Refills: 0 | Status: SHIPPED | OUTPATIENT
Start: 2019-05-08 | End: 2019-05-13 | Stop reason: ALTCHOICE

## 2019-05-08 NOTE — PROGRESS NOTES
HPI:    Patient ID: Brent De La Cruz is a 47year old male. Pt presents with cold symptoms for 4-5 days. Pt has had cough, hoarseness, sore throat. No fevers. Pt has tried otc remedies without relief. Pt states no sick contacts.    Pt is planning on starti He has a normal mood and affect. Vitals reviewed. ASSESSMENT/PLAN:   Acute uri/ Cough:  - After discussion with patient, zithromax as directed; Over the counter remedies discussed; To call if worse or not better;  Follow up in one week if not

## 2019-05-10 ENCOUNTER — APPOINTMENT (OUTPATIENT)
Dept: HEMATOLOGY/ONCOLOGY | Facility: HOSPITAL | Age: 54
End: 2019-05-10
Attending: INTERNAL MEDICINE
Payer: COMMERCIAL

## 2019-05-13 ENCOUNTER — OFFICE VISIT (OUTPATIENT)
Dept: HEMATOLOGY/ONCOLOGY | Facility: HOSPITAL | Age: 54
End: 2019-05-13
Attending: INTERNAL MEDICINE
Payer: COMMERCIAL

## 2019-05-13 VITALS
SYSTOLIC BLOOD PRESSURE: 156 MMHG | OXYGEN SATURATION: 94 % | DIASTOLIC BLOOD PRESSURE: 82 MMHG | TEMPERATURE: 99 F | BODY MASS INDEX: 36.06 KG/M2 | HEIGHT: 74 IN | WEIGHT: 281 LBS | HEART RATE: 66 BPM | RESPIRATION RATE: 18 BRPM

## 2019-05-13 DIAGNOSIS — Z51.11 ENCOUNTER FOR CHEMOTHERAPY MANAGEMENT: ICD-10-CM

## 2019-05-13 DIAGNOSIS — C18.6 MALIGNANT NEOPLASM OF DESCENDING COLON (HCC): ICD-10-CM

## 2019-05-13 DIAGNOSIS — E87.6 HYPOKALEMIA: Primary | ICD-10-CM

## 2019-05-13 DIAGNOSIS — C18.6 MALIGNANT NEOPLASM OF DESCENDING COLON (HCC): Primary | ICD-10-CM

## 2019-05-13 PROCEDURE — 85025 COMPLETE CBC W/AUTO DIFF WBC: CPT

## 2019-05-13 PROCEDURE — 80053 COMPREHEN METABOLIC PANEL: CPT

## 2019-05-13 PROCEDURE — 36591 DRAW BLOOD OFF VENOUS DEVICE: CPT

## 2019-05-13 PROCEDURE — 81003 URINALYSIS AUTO W/O SCOPE: CPT

## 2019-05-13 PROCEDURE — 99215 OFFICE O/P EST HI 40 MIN: CPT | Performed by: INTERNAL MEDICINE

## 2019-05-13 RX ORDER — AMLODIPINE BESYLATE 5 MG/1
5 TABLET ORAL
Qty: 90 TABLET | Refills: 1 | Status: SHIPPED | OUTPATIENT
Start: 2019-05-13 | End: 2019-06-14 | Stop reason: CLARIF

## 2019-05-13 RX ORDER — HEPARIN SODIUM (PORCINE) LOCK FLUSH IV SOLN 100 UNIT/ML 100 UNIT/ML
5 SOLUTION INTRAVENOUS ONCE
Status: CANCELLED | OUTPATIENT
Start: 2019-05-13

## 2019-05-13 RX ORDER — 0.9 % SODIUM CHLORIDE 0.9 %
VIAL (ML) INJECTION
Status: DISCONTINUED
Start: 2019-05-13 | End: 2019-05-13

## 2019-05-13 RX ORDER — 0.9 % SODIUM CHLORIDE 0.9 %
10 VIAL (ML) INJECTION ONCE
Status: CANCELLED | OUTPATIENT
Start: 2019-05-13

## 2019-05-13 RX ORDER — FLUOROURACIL 50 MG/ML
2400 INJECTION, SOLUTION INTRAVENOUS CONTINUOUS
Status: CANCELLED | OUTPATIENT
Start: 2019-05-13

## 2019-05-13 RX ORDER — POTASSIUM CHLORIDE 1500 MG/1
40 TABLET, FILM COATED, EXTENDED RELEASE ORAL DAILY
Qty: 60 TABLET | Refills: 3 | Status: SHIPPED | OUTPATIENT
Start: 2019-05-13 | End: 2020-01-01

## 2019-05-13 RX ORDER — HEPARIN SODIUM (PORCINE) LOCK FLUSH IV SOLN 100 UNIT/ML 100 UNIT/ML
5 SOLUTION INTRAVENOUS ONCE
Status: COMPLETED | OUTPATIENT
Start: 2019-05-13 | End: 2019-05-13

## 2019-05-13 RX ADMIN — HEPARIN SODIUM (PORCINE) LOCK FLUSH IV SOLN 100 UNIT/ML 500 UNITS: 100 SOLUTION INTRAVENOUS at 15:00:00

## 2019-05-13 NOTE — PROGRESS NOTES
Cancer Center Progress Note    Patient Name: Tiago Alicea   YOB: 1965   Medical Record Number: [id number]   CSN: [id number]   Consulting Physician: Emanuel Sanon MD  Referring Physician(s): Aldo Schaefer  Date of Visit: 5/13/2019     Chief current colonoscopy findings. Patient continues to feel well without any symptoms of disease or systemic signs of illness.   Denies any bleeding or bruising symptoms, no chest pain, no dyspnea, no nausea, vomiting abdominal pain, no change in activity lon Financial resource strain: Not on file      Food insecurity:        Worry: Not on file        Inability: Not on file      Transportation needs:        Medical: Not on file        Non-medical: Not on file    Tobacco Use      Smoking status: Never Smoker Allergies:     Dust Mite Extract       UNKNOWN    Current Medications:    Potassium Chloride ER 20 MEQ Oral Tab CR Take 40 mEq by mouth daily.  Disp: 60 tablet Rfl: 3   Prochlorperazine Maleate (COMPAZINE) 10 mg tablet Take 1 tablet (10 mg total) by m appropriate questions and responses   HEENT: EOMs intact. Oropharynx is clear. Neck: No palpable lymphadenopathy. Neck is supple. Lymphatics:  There is no palpable lymphadenopathy throughout in the cervical, supraclavicular, axillary, or inguinal regions commonly seen in this area however malignancy cannot be entirely excluded. Please correlate with colonoscopy findings. MRI abdomen 4/12/19  CONCLUSION:   1. Two hepatic lesions (segment 4 B and segment 3) are suspicious for metastatic disease.     CT C/ for 6 months prior to surgical resection of the primary colonic mass and regional lymph node evaluation    --Pretreatment CEA is not elevated at 1.9 ng/mL; discussed will need to follow trends closely in light of his normal value result in the presence of

## 2019-05-14 ENCOUNTER — OFFICE VISIT (OUTPATIENT)
Dept: HEMATOLOGY/ONCOLOGY | Facility: HOSPITAL | Age: 54
End: 2019-05-14
Attending: INTERNAL MEDICINE
Payer: COMMERCIAL

## 2019-05-14 VITALS
DIASTOLIC BLOOD PRESSURE: 73 MMHG | SYSTOLIC BLOOD PRESSURE: 138 MMHG | HEART RATE: 59 BPM | TEMPERATURE: 98 F | RESPIRATION RATE: 18 BRPM | OXYGEN SATURATION: 97 %

## 2019-05-14 DIAGNOSIS — C18.6 MALIGNANT NEOPLASM OF DESCENDING COLON (HCC): Primary | ICD-10-CM

## 2019-05-14 PROCEDURE — 96375 TX/PRO/DX INJ NEW DRUG ADDON: CPT

## 2019-05-14 PROCEDURE — 96415 CHEMO IV INFUSION ADDL HR: CPT

## 2019-05-14 PROCEDURE — 96413 CHEMO IV INFUSION 1 HR: CPT

## 2019-05-14 PROCEDURE — 96368 THER/DIAG CONCURRENT INF: CPT

## 2019-05-14 RX ORDER — 0.9 % SODIUM CHLORIDE 0.9 %
VIAL (ML) INJECTION
Status: DISCONTINUED
Start: 2019-05-14 | End: 2019-05-14

## 2019-05-14 RX ORDER — FLUOROURACIL 50 MG/ML
2400 INJECTION, SOLUTION INTRAVENOUS CONTINUOUS
Status: DISCONTINUED | OUTPATIENT
Start: 2019-05-14 | End: 2019-05-14

## 2019-05-14 RX ORDER — DEXTROSE MONOHYDRATE 50 MG/ML
INJECTION, SOLUTION INTRAVENOUS
Status: DISCONTINUED
Start: 2019-05-14 | End: 2019-05-14

## 2019-05-14 RX ADMIN — FLUOROURACIL 6000 MG: 50 INJECTION, SOLUTION INTRAVENOUS at 14:23:00

## 2019-05-14 NOTE — PROGRESS NOTES
Pt here for C  1 D   1   Folfox       Arrives Ambulating independently, accompanied by Spouse           Modifications in dose or schedule: No     Frequency of blood return and site check throughout administration: Prior to administration, Prior to each arnol wherever you go. Any fluid is acceptable, but caffeinated products do not count towards your intake and should be limited to 1-2 drinks/day.   Physical Activity    o If your doctor approves, be as physically active as you can, but start out slowly, and inc drink for 24 hours or have signs of dehydration: tiredness, thirst, dry mouth, dark and decreased amount of urine  • Diarrhea – not controlled with Imodium AD or more than 6 episodes in 24 hours  • Constipation –no bowel movement x 3 days, no response to s by clamping the tubing and turning it off.  c. Cover the connection with a paper towel and a plastic bag.  d. Refer to the Chemotherapy Spill Kit given to you. 3. Call the infusion pump company for further instructions.   Contact your doctor’s office with while on these medications and for several months or years after therapy. Chemotherapy can have harmful side effects to the fetus, especially in the first trimester.   In addition, menstrual cycles can become irregular during and after treatment, so you ma

## 2019-05-14 NOTE — PATIENT INSTRUCTIONS
Medication Education Record: IV Therapy      Recommended Anti-nausea medications (as directed by your provider):  Prochloperazine (Compazine) 10 mg every 6 hours and Ondansetron (Zofran) 8 mg every 8 hours  Take as needed    Helpful hints during cancer cinthya Magnesia or MiraLAX  o If you have persistent diarrhea or constipation, please contact the triage nurse for further instructions.   Skin Care  o Avoid direct sunlight.  o Wear a broad-spectrum sunscreen with an SPF of 30 or higher on any skin exposed to the the clinic or at home, the following precautions must be taken to lessen any exposure to the medication. Home Intravenous (IV) Medication:  1. Make sure the connections of your IV tubing are tight and not leaking. You should do this twice a day.     2. fluids after you have treatment. Sexual activity is safe while throughout treatment. It is possible that traces of the oral chemotherapy may be present in vaginal fluid or semen for 48 hours after taking. A condom should be used during this time.   Effe

## 2019-05-15 ENCOUNTER — TELEPHONE (OUTPATIENT)
Dept: HEMATOLOGY/ONCOLOGY | Facility: HOSPITAL | Age: 54
End: 2019-05-15

## 2019-05-15 NOTE — TELEPHONE ENCOUNTER
Called patient back he states he no longer has headache. Encouraged patient to call PCP and let them know his BP has been running high. Discussed with patient that medication that can increase your BP has not started - will begin with cycle 2.   Patient s

## 2019-05-15 NOTE — TELEPHONE ENCOUNTER
Pt came to  this morning c/o of headache. He states Dr. Immanuel Echevarria did mention that his BP medication would have to be possibly increased due to chemo he is getting as it would elevate his BP. Sima Jerome MA checked /100 P72.  No other symptoms and s

## 2019-05-16 ENCOUNTER — NURSE ONLY (OUTPATIENT)
Dept: HEMATOLOGY/ONCOLOGY | Facility: HOSPITAL | Age: 54
End: 2019-05-16
Attending: INTERNAL MEDICINE
Payer: COMMERCIAL

## 2019-05-16 VITALS
HEART RATE: 68 BPM | DIASTOLIC BLOOD PRESSURE: 70 MMHG | TEMPERATURE: 98 F | RESPIRATION RATE: 16 BRPM | SYSTOLIC BLOOD PRESSURE: 116 MMHG

## 2019-05-16 DIAGNOSIS — C18.6 MALIGNANT NEOPLASM OF DESCENDING COLON (HCC): Primary | ICD-10-CM

## 2019-05-16 PROCEDURE — 96523 IRRIG DRUG DELIVERY DEVICE: CPT

## 2019-05-16 RX ORDER — HEPARIN SODIUM (PORCINE) LOCK FLUSH IV SOLN 100 UNIT/ML 100 UNIT/ML
5 SOLUTION INTRAVENOUS ONCE
Status: CANCELLED | OUTPATIENT
Start: 2019-05-16

## 2019-05-16 RX ORDER — 0.9 % SODIUM CHLORIDE 0.9 %
VIAL (ML) INJECTION
Status: DISCONTINUED
Start: 2019-05-16 | End: 2019-05-16

## 2019-05-16 RX ORDER — HEPARIN SODIUM (PORCINE) LOCK FLUSH IV SOLN 100 UNIT/ML 100 UNIT/ML
5 SOLUTION INTRAVENOUS ONCE
Status: COMPLETED | OUTPATIENT
Start: 2019-05-16 | End: 2019-05-16

## 2019-05-16 RX ORDER — 0.9 % SODIUM CHLORIDE 0.9 %
10 VIAL (ML) INJECTION ONCE
Status: CANCELLED | OUTPATIENT
Start: 2019-05-16

## 2019-05-16 RX ORDER — METOCLOPRAMIDE 10 MG/1
10 TABLET ORAL 4 TIMES DAILY PRN
Qty: 60 TABLET | Refills: 1 | Status: ON HOLD | OUTPATIENT
Start: 2019-05-16 | End: 2020-01-09

## 2019-05-16 RX ADMIN — HEPARIN SODIUM (PORCINE) LOCK FLUSH IV SOLN 100 UNIT/ML 500 UNITS: 100 SOLUTION INTRAVENOUS at 13:01:00

## 2019-05-16 NOTE — PROGRESS NOTES
Pt arrived around 1100 to dc cadd. However, pump with 4ml remaining, rate 2.6/hr. He is working at [employer], agrees to return once completed. States he is okay presently. Pt returned around 1300, this time cadd completed.   Patient presented with CADD pump

## 2019-05-17 ENCOUNTER — TELEPHONE (OUTPATIENT)
Dept: HEMATOLOGY/ONCOLOGY | Facility: HOSPITAL | Age: 54
End: 2019-05-17

## 2019-05-17 NOTE — TELEPHONE ENCOUNTER
Called patient in follow up - states his hiccups are gone feels a little woozt, encouraged to take antinausea medication if he is feeling nauseated and to try and drink plenty of fluids.

## 2019-05-21 ENCOUNTER — TELEPHONE (OUTPATIENT)
Dept: HEMATOLOGY/ONCOLOGY | Facility: HOSPITAL | Age: 54
End: 2019-05-21

## 2019-05-21 NOTE — TELEPHONE ENCOUNTER
Called patient in follow up for C1D8- no nausea or diarrhea eating and drinking well. Feels pretty good. Reminded patient to call if he has any chills or fevers 100.4 or greater. Patient stated understanding.   Reviewed next appointments days and times w

## 2019-05-22 ENCOUNTER — TELEPHONE (OUTPATIENT)
Dept: OTHER | Age: 54
End: 2019-05-22

## 2019-05-22 NOTE — TELEPHONE ENCOUNTER
Per pt seen for hoarseness and states he is better but still has a raspy voice. denies pain or cold symptoms. Would like to le the doctor know please advise.

## 2019-05-22 NOTE — TELEPHONE ENCOUNTER
Message noted. If improving and can continue to monitor; if worse or persistent, consider follow up with ENT.  Can give # [phone]

## 2019-05-24 ENCOUNTER — OFFICE VISIT (OUTPATIENT)
Dept: HEMATOLOGY/ONCOLOGY | Facility: HOSPITAL | Age: 54
End: 2019-05-24
Attending: INTERNAL MEDICINE
Payer: COMMERCIAL

## 2019-05-24 VITALS
HEIGHT: 74 IN | RESPIRATION RATE: 16 BRPM | HEART RATE: 68 BPM | WEIGHT: 277 LBS | DIASTOLIC BLOOD PRESSURE: 75 MMHG | OXYGEN SATURATION: 98 % | TEMPERATURE: 99 F | BODY MASS INDEX: 35.55 KG/M2 | SYSTOLIC BLOOD PRESSURE: 133 MMHG

## 2019-05-24 DIAGNOSIS — Z51.11 ENCOUNTER FOR CHEMOTHERAPY MANAGEMENT: Primary | ICD-10-CM

## 2019-05-24 DIAGNOSIS — C78.7 COLON CANCER METASTASIZED TO LIVER (HCC): ICD-10-CM

## 2019-05-24 DIAGNOSIS — C18.9 COLON CANCER METASTASIZED TO LIVER (HCC): ICD-10-CM

## 2019-05-24 DIAGNOSIS — E87.6 HYPOKALEMIA: ICD-10-CM

## 2019-05-24 DIAGNOSIS — C18.6 MALIGNANT NEOPLASM OF DESCENDING COLON (HCC): Primary | ICD-10-CM

## 2019-05-24 PROCEDURE — 80053 COMPREHEN METABOLIC PANEL: CPT

## 2019-05-24 PROCEDURE — 99215 OFFICE O/P EST HI 40 MIN: CPT | Performed by: INTERNAL MEDICINE

## 2019-05-24 PROCEDURE — 85025 COMPLETE CBC W/AUTO DIFF WBC: CPT

## 2019-05-24 PROCEDURE — 36415 COLL VENOUS BLD VENIPUNCTURE: CPT

## 2019-05-24 RX ORDER — FLUOROURACIL 50 MG/ML
2400 INJECTION, SOLUTION INTRAVENOUS CONTINUOUS
Status: CANCELLED | OUTPATIENT
Start: 2019-05-24

## 2019-05-24 NOTE — PROGRESS NOTES
Cancer Center Progress Note    Patient Name: Abida Rubio   YOB: 1965   Medical Record Number: [id number]   CSN: [id number]   Consulting Physician: Wyatt Peña MD  Referring Physician(s): Kathryn Albarado  Date of Visit: 5/24/2019     Chief current colonoscopy findings. Patient continues to feel well without any symptoms of disease or systemic signs of illness.   Denies any bleeding or bruising symptoms, no chest pain, no dyspnea, no nausea, vomiting abdominal pain, no change in activity lon on file    Occupational History      Occupation: shipping and rec.      Social Needs      Financial resource strain: Not on file      Food insecurity:        Worry: Not on file        Inability: Not on file      Transportation needs:        Medical: Not on formerly was a , enjoys time with his family, and watching basketball.        Allergies:     Dust Mite Extract       UNKNOWN    Current Medications:    Metoclopramide HCl 10 MG Oral Tab Take 1 tablet (10 mg total) by mouth 4 (four) times da 16   Ht 1.88 m (6' 2\")   Wt 125.6 kg (277 lb)   SpO2 98%   BMI 35.56 kg/m²     Physical Examination:    General: Patient is alert and oriented x 3, not in acute distress.   Psych:  Friendly, cooperative, with appropriate questions and responses   HEENT: EO colon and the presence of 2 suspicious lesions in the liver concerning for metastatic disease; segments 4B and segment 3    Plan:    1.) Adenocarcinoma of the colon with likely two liver mets --stage IV    --patient was informed of his diagnosis colon canc diarrhea      Wyatt Peña MD  [facility]  [address], [facility]

## 2019-05-28 ENCOUNTER — OFFICE VISIT (OUTPATIENT)
Dept: HEMATOLOGY/ONCOLOGY | Facility: HOSPITAL | Age: 54
End: 2019-05-28
Attending: INTERNAL MEDICINE
Payer: COMMERCIAL

## 2019-05-28 VITALS
TEMPERATURE: 98 F | DIASTOLIC BLOOD PRESSURE: 83 MMHG | HEART RATE: 72 BPM | OXYGEN SATURATION: 99 % | RESPIRATION RATE: 16 BRPM | SYSTOLIC BLOOD PRESSURE: 150 MMHG

## 2019-05-28 DIAGNOSIS — C18.6 MALIGNANT NEOPLASM OF DESCENDING COLON (HCC): Primary | ICD-10-CM

## 2019-05-28 PROCEDURE — 96368 THER/DIAG CONCURRENT INF: CPT

## 2019-05-28 PROCEDURE — 96413 CHEMO IV INFUSION 1 HR: CPT

## 2019-05-28 PROCEDURE — 96417 CHEMO IV INFUS EACH ADDL SEQ: CPT

## 2019-05-28 PROCEDURE — 96375 TX/PRO/DX INJ NEW DRUG ADDON: CPT

## 2019-05-28 PROCEDURE — 96415 CHEMO IV INFUSION ADDL HR: CPT

## 2019-05-28 RX ORDER — DEXTROSE MONOHYDRATE 50 MG/ML
INJECTION, SOLUTION INTRAVENOUS
Status: DISCONTINUED
Start: 2019-05-28 | End: 2019-05-28

## 2019-05-28 RX ORDER — SODIUM CHLORIDE 9 MG/ML
INJECTION, SOLUTION INTRAVENOUS
Status: DISCONTINUED
Start: 2019-05-28 | End: 2019-05-28

## 2019-05-28 RX ORDER — 0.9 % SODIUM CHLORIDE 0.9 %
VIAL (ML) INJECTION
Status: DISCONTINUED
Start: 2019-05-28 | End: 2019-05-28

## 2019-05-28 RX ORDER — FLUOROURACIL 50 MG/ML
2400 INJECTION, SOLUTION INTRAVENOUS CONTINUOUS
Status: DISCONTINUED | OUTPATIENT
Start: 2019-05-28 | End: 2019-05-28

## 2019-05-28 RX ADMIN — FLUOROURACIL 6000 MG: 50 INJECTION, SOLUTION INTRAVENOUS at 13:36:00

## 2019-05-28 NOTE — PROGRESS NOTES
Pt here for C2D1 FOLFOX, Avastin.   Arrives Ambulating independently, accompanied by Friend           Modifications in dose or schedule: Yes, Avastin added to this treatment    Patient reports he is feeling well, reports some neuropathy in his hands and fee

## 2019-05-30 ENCOUNTER — NURSE ONLY (OUTPATIENT)
Dept: HEMATOLOGY/ONCOLOGY | Facility: HOSPITAL | Age: 54
End: 2019-05-30
Attending: INTERNAL MEDICINE
Payer: COMMERCIAL

## 2019-05-30 DIAGNOSIS — C18.6 MALIGNANT NEOPLASM OF DESCENDING COLON (HCC): Primary | ICD-10-CM

## 2019-05-30 PROCEDURE — 96523 IRRIG DRUG DELIVERY DEVICE: CPT

## 2019-05-30 RX ORDER — 0.9 % SODIUM CHLORIDE 0.9 %
10 VIAL (ML) INJECTION ONCE
Status: CANCELLED | OUTPATIENT
Start: 2019-05-30

## 2019-05-30 RX ORDER — 0.9 % SODIUM CHLORIDE 0.9 %
VIAL (ML) INJECTION
Status: DISCONTINUED
Start: 2019-05-30 | End: 2019-05-30

## 2019-05-30 RX ORDER — HEPARIN SODIUM (PORCINE) LOCK FLUSH IV SOLN 100 UNIT/ML 100 UNIT/ML
5 SOLUTION INTRAVENOUS ONCE
Status: CANCELLED | OUTPATIENT
Start: 2019-05-30

## 2019-05-30 RX ORDER — HEPARIN SODIUM (PORCINE) LOCK FLUSH IV SOLN 100 UNIT/ML 100 UNIT/ML
5 SOLUTION INTRAVENOUS ONCE
Status: COMPLETED | OUTPATIENT
Start: 2019-05-30 | End: 2019-05-30

## 2019-05-30 RX ADMIN — HEPARIN SODIUM (PORCINE) LOCK FLUSH IV SOLN 100 UNIT/ML 500 UNITS: 100 SOLUTION INTRAVENOUS at 12:34:00

## 2019-06-07 ENCOUNTER — OFFICE VISIT (OUTPATIENT)
Dept: OTOLARYNGOLOGY | Facility: CLINIC | Age: 54
End: 2019-06-07
Payer: COMMERCIAL

## 2019-06-07 ENCOUNTER — TELEPHONE (OUTPATIENT)
Dept: HEMATOLOGY/ONCOLOGY | Facility: HOSPITAL | Age: 54
End: 2019-06-07

## 2019-06-07 VITALS
HEIGHT: 74 IN | BODY MASS INDEX: 22.59 KG/M2 | WEIGHT: 176 LBS | DIASTOLIC BLOOD PRESSURE: 98 MMHG | SYSTOLIC BLOOD PRESSURE: 142 MMHG | TEMPERATURE: 98 F

## 2019-06-07 DIAGNOSIS — K21.9 LARYNGOPHARYNGEAL REFLUX (LPR): Primary | ICD-10-CM

## 2019-06-07 PROCEDURE — 99212 OFFICE O/P EST SF 10 MIN: CPT | Performed by: OTOLARYNGOLOGY

## 2019-06-07 PROCEDURE — 31575 DIAGNOSTIC LARYNGOSCOPY: CPT | Performed by: OTOLARYNGOLOGY

## 2019-06-07 PROCEDURE — 99244 OFF/OP CNSLTJ NEW/EST MOD 40: CPT | Performed by: OTOLARYNGOLOGY

## 2019-06-07 RX ORDER — OMEPRAZOLE 40 MG/1
40 CAPSULE, DELAYED RELEASE ORAL DAILY
Qty: 30 CAPSULE | Refills: 11 | Status: SHIPPED | OUTPATIENT
Start: 2019-06-07 | End: 2020-01-01

## 2019-06-07 NOTE — TELEPHONE ENCOUNTER
Emy Suazo called to speak with somebody regarding the effects that the med he is on will effect his voice.

## 2019-06-07 NOTE — PROGRESS NOTES
Theresa Castillo is a 47year old male.   Patient presents with:  Throat Problem: hoarsness ,patient took a zpak last month ,seemed a bit better but now has returned ,he started chemo ,thinks it may be the cause of the return symptoms   Ear Pain: fluttering , watching basketball.        Family History   Problem Relation Age of Onset   • Hypertension Mother    • Thyroid Disorder Mother    • Heart Disorder Mother    • Diabetes Maternal Aunt    • Cancer Maternal Aunt 68        pancreatic cancer   • Cancer Maternal Normal Conjunctiva - Right: Normal, Left: Normal. Pupil - Right: Normal, Left: Normal. Fundus - Right: Normal, Left: Normal.   Neurological Normal Memory - Normal. Cranial nerves - Cranial nerves II through XII grossly intact.    Head/Face Normal Facial fea Omeprazole 40 MG Oral Capsule Delayed Release, Take 1 capsule (40 mg total) by mouth daily. Before a meal, Disp: 30 capsule, Rfl: 11  •  amLODIPine Besylate 5 MG Oral Tab, Take 1 tablet (5 mg total) by mouth once daily. , Disp: 90 tablet, Rfl: 1  •  Pot[place]i

## 2019-06-07 NOTE — TELEPHONE ENCOUNTER
Sreekanth Retana states that he saw Dr Shubham Cherry today and she prescribed PPI to treat what looks like evidence of reflux causing his hoarseness. He shares that he was also prescribed z pack to treat it without any success.   I explained that related to chemotherapy, t

## 2019-06-10 ENCOUNTER — NURSE ONLY (OUTPATIENT)
Dept: HEMATOLOGY/ONCOLOGY | Facility: HOSPITAL | Age: 54
End: 2019-06-10
Attending: INTERNAL MEDICINE
Payer: COMMERCIAL

## 2019-06-10 VITALS
BODY MASS INDEX: 35.81 KG/M2 | TEMPERATURE: 98 F | WEIGHT: 279 LBS | HEART RATE: 71 BPM | OXYGEN SATURATION: 98 % | SYSTOLIC BLOOD PRESSURE: 144 MMHG | RESPIRATION RATE: 18 BRPM | DIASTOLIC BLOOD PRESSURE: 87 MMHG | HEIGHT: 74 IN

## 2019-06-10 DIAGNOSIS — C18.9 COLON CANCER METASTASIZED TO LIVER (HCC): ICD-10-CM

## 2019-06-10 DIAGNOSIS — C18.6 MALIGNANT NEOPLASM OF DESCENDING COLON (HCC): Primary | ICD-10-CM

## 2019-06-10 DIAGNOSIS — Z51.11 ENCOUNTER FOR CHEMOTHERAPY MANAGEMENT: Primary | ICD-10-CM

## 2019-06-10 DIAGNOSIS — C78.7 COLON CANCER METASTASIZED TO LIVER (HCC): ICD-10-CM

## 2019-06-10 PROCEDURE — 85025 COMPLETE CBC W/AUTO DIFF WBC: CPT

## 2019-06-10 PROCEDURE — 80053 COMPREHEN METABOLIC PANEL: CPT

## 2019-06-10 PROCEDURE — 36415 COLL VENOUS BLD VENIPUNCTURE: CPT

## 2019-06-10 PROCEDURE — 99215 OFFICE O/P EST HI 40 MIN: CPT | Performed by: INTERNAL MEDICINE

## 2019-06-10 RX ORDER — FLUOROURACIL 50 MG/ML
2400 INJECTION, SOLUTION INTRAVENOUS CONTINUOUS
Status: CANCELLED | OUTPATIENT
Start: 2019-06-10

## 2019-06-10 NOTE — PROGRESS NOTES
Cancer Center Progress Note    Patient Name: Lorena Diaz   YOB: 1965   Medical Record Number: [id number]   CSN: [id number]   Consulting Physician: Leah Kapoor MD  Referring Physician(s): Ankush Mao  Date of Visit: 6/10/2019     Chief current colonoscopy findings. Patient continues to feel well without any symptoms of disease or systemic signs of illness.   Denies any bleeding or bruising symptoms, no chest pain, no dyspnea, no nausea, vomiting abdominal pain, no change in activity lon Spouse name: Not on file      Number of children: 4      Years of education: Not on file      Highest education level: Not on file    Occupational History      Occupation: shipping and rec.      Social Needs      Financial resource strain: Not on file children; ages 32-20. He works in  for [employer]. He lives his [facility] in [facility]. He formerly was a , enjoys time with his family, and watching basketball.        Allergies:     Dust Mite Extract comprehensive 14 point review of systems was completed. Pertinent positives and negatives noted in the the HPI.      Vital Signs:  /87 (BP Location: Left arm, Patient Position: Sitting, Cuff Size: large)   Pulse 71   Temp 97.8 °F (36.6 °C) (Oral)   R 11:02 AM       Imaging:    N/A        Impression:    (C18.9,  C78.7) Colon cancer metastasized to liver [facility])    47year old M with no prior contributing past medical history and maternal aunt affected by pancreatic cancer presents for evaluation of adenoc induced neutropenia.     2.) hx of chronic low back pain    ---noted and unchanged per his report    3.) hypokalemia    --Patient continues with prescription for 40 mEq of potassium daily; does not appear to be on any prescription medications contributing t

## 2019-06-11 ENCOUNTER — APPOINTMENT (OUTPATIENT)
Dept: HEMATOLOGY/ONCOLOGY | Facility: HOSPITAL | Age: 54
End: 2019-06-11
Attending: INTERNAL MEDICINE
Payer: COMMERCIAL

## 2019-06-13 ENCOUNTER — APPOINTMENT (OUTPATIENT)
Dept: HEMATOLOGY/ONCOLOGY | Facility: HOSPITAL | Age: 54
End: 2019-06-13
Attending: INTERNAL MEDICINE
Payer: COMMERCIAL

## 2019-06-13 NOTE — TELEPHONE ENCOUNTER
[employer] message sent. Amlodipine was refilled don 5/13/19 for 6 months through [facility] and now [facility] location is requesting for refill, please clarify pharmacy location that he is using and update our record.

## 2019-06-14 RX ORDER — AMLODIPINE BESYLATE 5 MG/1
TABLET ORAL
Qty: 90 TABLET | Refills: 1 | Status: SHIPPED | OUTPATIENT
Start: 2019-06-14 | End: 2019-10-03

## 2019-06-14 NOTE — TELEPHONE ENCOUNTER
Patient returned call, requesting his script for Amlodipine be cancelled with [facility] and be re-sent to [facility] within [address] called with [facility] location and re-sent to Kyle at [facility], pt will be picking up today.

## 2019-06-17 ENCOUNTER — NURSE ONLY (OUTPATIENT)
Dept: HEMATOLOGY/ONCOLOGY | Facility: HOSPITAL | Age: 54
End: 2019-06-17
Attending: INTERNAL MEDICINE
Payer: COMMERCIAL

## 2019-06-17 DIAGNOSIS — C18.6 MALIGNANT NEOPLASM OF DESCENDING COLON (HCC): Primary | ICD-10-CM

## 2019-06-17 PROCEDURE — 80053 COMPREHEN METABOLIC PANEL: CPT

## 2019-06-17 PROCEDURE — 36415 COLL VENOUS BLD VENIPUNCTURE: CPT

## 2019-06-17 PROCEDURE — 85025 COMPLETE CBC W/AUTO DIFF WBC: CPT

## 2019-06-17 NOTE — PROGRESS NOTES
Patient here for Lab draw. States at times his stomach feels funny, states at times it feels like nausea, but concerned he shouldn't have nausea because he had chemotherapy a couple of weeks ago. Patient denies having vomiting.    I told him that its possi

## 2019-06-18 ENCOUNTER — APPOINTMENT (OUTPATIENT)
Dept: HEMATOLOGY/ONCOLOGY | Facility: HOSPITAL | Age: 54
End: 2019-06-18
Attending: INTERNAL MEDICINE
Payer: COMMERCIAL

## 2019-06-19 ENCOUNTER — OFFICE VISIT (OUTPATIENT)
Dept: HEMATOLOGY/ONCOLOGY | Facility: HOSPITAL | Age: 54
End: 2019-06-19
Attending: INTERNAL MEDICINE
Payer: COMMERCIAL

## 2019-06-19 ENCOUNTER — TELEPHONE (OUTPATIENT)
Dept: HEMATOLOGY/ONCOLOGY | Facility: HOSPITAL | Age: 54
End: 2019-06-19

## 2019-06-19 VITALS
WEIGHT: 276 LBS | OXYGEN SATURATION: 97 % | HEIGHT: 74 IN | RESPIRATION RATE: 18 BRPM | BODY MASS INDEX: 35.42 KG/M2 | TEMPERATURE: 98 F | HEART RATE: 72 BPM | SYSTOLIC BLOOD PRESSURE: 155 MMHG | DIASTOLIC BLOOD PRESSURE: 85 MMHG

## 2019-06-19 DIAGNOSIS — C18.9 COLON CANCER METASTASIZED TO LIVER (HCC): Primary | ICD-10-CM

## 2019-06-19 DIAGNOSIS — C78.7 COLON CANCER METASTASIZED TO LIVER (HCC): Primary | ICD-10-CM

## 2019-06-19 DIAGNOSIS — R51.9 HEADACHE AROUND THE EYES: ICD-10-CM

## 2019-06-19 DIAGNOSIS — I10 ESSENTIAL HYPERTENSION: ICD-10-CM

## 2019-06-19 PROCEDURE — 99213 OFFICE O/P EST LOW 20 MIN: CPT | Performed by: PHYSICIAN ASSISTANT

## 2019-06-19 NOTE — PROGRESS NOTES
S:  47year old male presents today with a c/o mild, daily headache around his eyes for 3-4 days. He has a h/o migraines for which he states these headaches are less intense and shorter in duration.   (He was not officially diagnosed with migraines, larry Headaches may be attributable to 5HT3 use. Stop ondansetron. Use metoclopramide prn for nausea  4. Tylenol prn for HAs  5. Call prn  6. Keep schedule pre-chemo appointment.

## 2019-06-19 NOTE — TELEPHONE ENCOUNTER
Isac Mariah stopped at  as he works here. Colon Cancer-C2 5/28/19 Folfox, Cycle 3 held due to neutropenia x 2.     Complains of Headache x 3 days, pain in forehead, sinus area, but does not feel it is sinus, no vision changes, has had nausea relate

## 2019-06-19 NOTE — PATIENT INSTRUCTIONS
1. Check blood pressure at home at least every other day. If 140/90 or greater, check daily. Low blood pressure is < 90/60. Ideal is 110/70 - 120/80  Borderline is 140/90  High, and therefore call office is 160/100 or higher    2.   Stop Dauna Ducking

## 2019-06-20 ENCOUNTER — APPOINTMENT (OUTPATIENT)
Dept: HEMATOLOGY/ONCOLOGY | Facility: HOSPITAL | Age: 54
End: 2019-06-20
Attending: INTERNAL MEDICINE
Payer: COMMERCIAL

## 2019-06-24 ENCOUNTER — LAB ENCOUNTER (OUTPATIENT)
Dept: LAB | Facility: HOSPITAL | Age: 54
End: 2019-06-24
Attending: INTERNAL MEDICINE
Payer: COMMERCIAL

## 2019-06-24 DIAGNOSIS — D70.9 NEUTROPENIA (HCC): ICD-10-CM

## 2019-06-24 PROCEDURE — 85025 COMPLETE CBC W/AUTO DIFF WBC: CPT

## 2019-06-24 PROCEDURE — 36415 COLL VENOUS BLD VENIPUNCTURE: CPT

## 2019-06-25 ENCOUNTER — OFFICE VISIT (OUTPATIENT)
Dept: HEMATOLOGY/ONCOLOGY | Facility: HOSPITAL | Age: 54
End: 2019-06-25
Attending: INTERNAL MEDICINE
Payer: COMMERCIAL

## 2019-06-25 VITALS
OXYGEN SATURATION: 98 % | RESPIRATION RATE: 16 BRPM | WEIGHT: 277 LBS | BODY MASS INDEX: 36 KG/M2 | HEART RATE: 67 BPM | TEMPERATURE: 98 F | SYSTOLIC BLOOD PRESSURE: 158 MMHG | DIASTOLIC BLOOD PRESSURE: 83 MMHG

## 2019-06-25 DIAGNOSIS — C18.6 MALIGNANT NEOPLASM OF DESCENDING COLON (HCC): Primary | ICD-10-CM

## 2019-06-25 PROCEDURE — 96375 TX/PRO/DX INJ NEW DRUG ADDON: CPT

## 2019-06-25 PROCEDURE — 96368 THER/DIAG CONCURRENT INF: CPT

## 2019-06-25 PROCEDURE — 96415 CHEMO IV INFUSION ADDL HR: CPT

## 2019-06-25 PROCEDURE — 96417 CHEMO IV INFUS EACH ADDL SEQ: CPT

## 2019-06-25 PROCEDURE — 96413 CHEMO IV INFUSION 1 HR: CPT

## 2019-06-25 RX ORDER — FLUOROURACIL 50 MG/ML
2400 INJECTION, SOLUTION INTRAVENOUS CONTINUOUS
Status: CANCELLED | OUTPATIENT
Start: 2019-06-25

## 2019-06-25 RX ORDER — DEXTROSE MONOHYDRATE 50 MG/ML
INJECTION, SOLUTION INTRAVENOUS
Status: DISCONTINUED
Start: 2019-06-25 | End: 2019-06-25

## 2019-06-25 RX ORDER — FLUOROURACIL 50 MG/ML
2400 INJECTION, SOLUTION INTRAVENOUS CONTINUOUS
Status: DISCONTINUED | OUTPATIENT
Start: 2019-06-25 | End: 2019-06-25

## 2019-06-25 RX ORDER — SODIUM CHLORIDE 9 MG/ML
INJECTION, SOLUTION INTRAVENOUS
Status: DISCONTINUED
Start: 2019-06-25 | End: 2019-06-25

## 2019-06-25 RX ORDER — 0.9 % SODIUM CHLORIDE 0.9 %
VIAL (ML) INJECTION
Status: DISCONTINUED
Start: 2019-06-25 | End: 2019-06-25

## 2019-06-25 RX ADMIN — FLUOROURACIL 6000 MG: 50 INJECTION, SOLUTION INTRAVENOUS at 14:02:00

## 2019-06-25 NOTE — PROGRESS NOTES
Pt here for C3D1 FOLFOX, Avastin. Arrives Ambulating independently, accompanied by Friend           Modifications in dose or schedule: Yes, Patient was delayed one week due to low ANC. Labs WNL today.      Patient reports he is feeling well, denies any com

## 2019-06-27 ENCOUNTER — NURSE ONLY (OUTPATIENT)
Dept: HEMATOLOGY/ONCOLOGY | Facility: HOSPITAL | Age: 54
End: 2019-06-27
Attending: INTERNAL MEDICINE
Payer: COMMERCIAL

## 2019-06-27 DIAGNOSIS — C18.6 MALIGNANT NEOPLASM OF DESCENDING COLON (HCC): Primary | ICD-10-CM

## 2019-06-27 PROCEDURE — 96523 IRRIG DRUG DELIVERY DEVICE: CPT

## 2019-06-27 RX ORDER — 0.9 % SODIUM CHLORIDE 0.9 %
10 VIAL (ML) INJECTION ONCE
Status: CANCELLED | OUTPATIENT
Start: 2019-06-27

## 2019-06-27 RX ORDER — 0.9 % SODIUM CHLORIDE 0.9 %
VIAL (ML) INJECTION
Status: DISCONTINUED
Start: 2019-06-27 | End: 2019-06-27

## 2019-06-27 RX ORDER — HEPARIN SODIUM (PORCINE) LOCK FLUSH IV SOLN 100 UNIT/ML 100 UNIT/ML
5 SOLUTION INTRAVENOUS ONCE
Status: CANCELLED | OUTPATIENT
Start: 2019-06-27

## 2019-06-27 RX ORDER — HEPARIN SODIUM (PORCINE) LOCK FLUSH IV SOLN 100 UNIT/ML 100 UNIT/ML
5 SOLUTION INTRAVENOUS ONCE
Status: COMPLETED | OUTPATIENT
Start: 2019-06-27 | End: 2019-06-27

## 2019-06-27 RX ADMIN — HEPARIN SODIUM (PORCINE) LOCK FLUSH IV SOLN 100 UNIT/ML 500 UNITS: 100 SOLUTION INTRAVENOUS at 13:04:00

## 2019-07-08 ENCOUNTER — LAB ENCOUNTER (OUTPATIENT)
Dept: LAB | Facility: HOSPITAL | Age: 54
End: 2019-07-08
Attending: INTERNAL MEDICINE
Payer: COMMERCIAL

## 2019-07-08 ENCOUNTER — APPOINTMENT (OUTPATIENT)
Dept: HEMATOLOGY/ONCOLOGY | Facility: HOSPITAL | Age: 54
End: 2019-07-08
Attending: INTERNAL MEDICINE
Payer: COMMERCIAL

## 2019-07-08 DIAGNOSIS — C18.6 MALIGNANT NEOPLASM OF DESCENDING COLON (HCC): ICD-10-CM

## 2019-07-08 LAB
ALBUMIN SERPL-MCNC: 3.3 G/DL (ref 3.4–5)
ALBUMIN/GLOB SERPL: 0.8 {RATIO} (ref 1–2)
ALP LIVER SERPL-CCNC: 120 U/L (ref 45–117)
ALT SERPL-CCNC: 19 U/L (ref 16–61)
ANION GAP SERPL CALC-SCNC: 7 MMOL/L (ref 0–18)
AST SERPL-CCNC: 16 U/L (ref 15–37)
BASOPHILS # BLD AUTO: 0.03 X10(3) UL (ref 0–0.2)
BASOPHILS NFR BLD AUTO: 0.7 %
BILIRUB SERPL-MCNC: 0.2 MG/DL (ref 0.1–2)
BUN BLD-MCNC: 19 MG/DL (ref 7–18)
BUN/CREAT SERPL: 14.6 (ref 10–20)
CALCIUM BLD-MCNC: 8.7 MG/DL (ref 8.5–10.1)
CHLORIDE SERPL-SCNC: 110 MMOL/L (ref 98–112)
CO2 SERPL-SCNC: 26 MMOL/L (ref 21–32)
CREAT BLD-MCNC: 1.3 MG/DL (ref 0.7–1.3)
DEPRECATED RDW RBC AUTO: 46 FL (ref 35.1–46.3)
EOSINOPHIL # BLD AUTO: 0.06 X10(3) UL (ref 0–0.7)
EOSINOPHIL NFR BLD AUTO: 1.5 %
ERYTHROCYTE [DISTWIDTH] IN BLOOD BY AUTOMATED COUNT: 14.7 % (ref 11–15)
GLOBULIN PLAS-MCNC: 3.9 G/DL (ref 2.8–4.4)
GLUCOSE BLD-MCNC: 105 MG/DL (ref 70–99)
HCT VFR BLD AUTO: 40.7 % (ref 39–53)
HGB BLD-MCNC: 12.7 G/DL (ref 13–17.5)
IMM GRANULOCYTES # BLD AUTO: 0.02 X10(3) UL (ref 0–1)
IMM GRANULOCYTES NFR BLD: 0.5 %
LYMPHOCYTES # BLD AUTO: 1.93 X10(3) UL (ref 1–4)
LYMPHOCYTES NFR BLD AUTO: 46.8 %
M PROTEIN MFR SERPL ELPH: 7.2 G/DL (ref 6.4–8.2)
MCH RBC QN AUTO: 27.1 PG (ref 26–34)
MCHC RBC AUTO-ENTMCNC: 31.2 G/DL (ref 31–37)
MCV RBC AUTO: 86.8 FL (ref 80–100)
MONOCYTES # BLD AUTO: 0.4 X10(3) UL (ref 0.1–1)
MONOCYTES NFR BLD AUTO: 9.7 %
NEUTROPHILS # BLD AUTO: 1.68 X10 (3) UL (ref 1.5–7.7)
NEUTROPHILS # BLD AUTO: 1.68 X10(3) UL (ref 1.5–7.7)
NEUTROPHILS NFR BLD AUTO: 40.8 %
OSMOLALITY SERPL CALC.SUM OF ELEC: 299 MOSM/KG (ref 275–295)
PATIENT FASTING: NO
PLATELET # BLD AUTO: 210 10(3)UL (ref 150–450)
POTASSIUM SERPL-SCNC: 3.7 MMOL/L (ref 3.5–5.1)
RBC # BLD AUTO: 4.69 X10(6)UL (ref 4.3–5.7)
SODIUM SERPL-SCNC: 143 MMOL/L (ref 136–145)
WBC # BLD AUTO: 4.1 X10(3) UL (ref 4–11)

## 2019-07-08 PROCEDURE — 85025 COMPLETE CBC W/AUTO DIFF WBC: CPT

## 2019-07-08 PROCEDURE — 80053 COMPREHEN METABOLIC PANEL: CPT

## 2019-07-08 PROCEDURE — 36415 COLL VENOUS BLD VENIPUNCTURE: CPT

## 2019-07-08 NOTE — PROGRESS NOTES
Cancer Center Progress Note    Patient Name: Sadie Gonzales   YOB: 1965   Medical Record Number: [id number]   CSN: [id number]   Consulting Physician: Ciaran Monson MD  Referring Physician(s): Arabella Juares  Date of Visit: 7/08/2019     Chief current colonoscopy findings. Patient continues to feel well without any symptoms of disease or systemic signs of illness.   Denies any bleeding or bruising symptoms, no chest pain, no dyspnea, no nausea, vomiting abdominal pain, no change in activity lon Not on file      Number of children: 4      Years of education: Not on file      Highest education level: Not on file    Occupational History      Occupation: shipping and rec.      Social Needs      Financial resource strain: Not on file      Food insecuri 32-21. He works in  for [employer]. He lives his [place] in [place], [place]. He formerly was a , enjoys time with his family, and watching basketball.        Allergies:     Dust Mite Extract       UNKNOWN comprehensive 14 point review of systems was completed. Pertinent positives and negatives noted in the the HPI.      Vital Signs:  /88 (BP Location: Left arm, Patient Position: Sitting, Cuff Size: large)   Pulse 75   Temp 98.2 °F (36.8 °C) (Oral)   R 07/08/2019 08:18 AM       Imaging:    N/A        Impression:    (F41.8) Anxiety about health  (primary encounter diagnosis)  Plan:  NAVIGATOR    (C18.6) [facility]  Plan:  NAVIGATOR    (C18.9,  C78.7) Colon cancer meta prior to planned surgery.  Pt will also undergo repeat colonoscopy within 1 yr of his diagnosis with Dr. Conrad Boone     --will proceed with cycle 4 of FOLFOX +bevacizumab (added at cycle 2 & all subsequent cycles)       2.) hx of chronic low back p

## 2019-07-09 ENCOUNTER — OFFICE VISIT (OUTPATIENT)
Dept: HEMATOLOGY/ONCOLOGY | Facility: HOSPITAL | Age: 54
End: 2019-07-09
Attending: INTERNAL MEDICINE
Payer: COMMERCIAL

## 2019-07-09 VITALS
SYSTOLIC BLOOD PRESSURE: 145 MMHG | HEART RATE: 68 BPM | RESPIRATION RATE: 18 BRPM | TEMPERATURE: 98 F | DIASTOLIC BLOOD PRESSURE: 91 MMHG | OXYGEN SATURATION: 99 %

## 2019-07-09 DIAGNOSIS — C18.6 MALIGNANT NEOPLASM OF DESCENDING COLON (HCC): Primary | ICD-10-CM

## 2019-07-09 DIAGNOSIS — C18.9 COLON CANCER (HCC): Primary | ICD-10-CM

## 2019-07-09 LAB
BILIRUB UR QL: NEGATIVE
CLARITY UR: CLEAR
COLOR UR: YELLOW
GLUCOSE UR-MCNC: NEGATIVE MG/DL
HGB UR QL STRIP.AUTO: NEGATIVE
KETONES UR-MCNC: NEGATIVE MG/DL
LEUKOCYTE ESTERASE UR QL STRIP.AUTO: NEGATIVE
NITRITE UR QL STRIP.AUTO: NEGATIVE
PH UR: 6 [PH] (ref 5–8)
PROT UR-MCNC: NEGATIVE MG/DL
SP GR UR STRIP: 1.01 (ref 1–1.03)
UROBILINOGEN UR STRIP-ACNC: <2
VIT C UR-MCNC: NEGATIVE MG/DL

## 2019-07-09 PROCEDURE — 96415 CHEMO IV INFUSION ADDL HR: CPT

## 2019-07-09 PROCEDURE — 96367 TX/PROPH/DG ADDL SEQ IV INF: CPT

## 2019-07-09 PROCEDURE — 96417 CHEMO IV INFUS EACH ADDL SEQ: CPT

## 2019-07-09 PROCEDURE — 96368 THER/DIAG CONCURRENT INF: CPT

## 2019-07-09 PROCEDURE — 81003 URINALYSIS AUTO W/O SCOPE: CPT

## 2019-07-09 PROCEDURE — 96413 CHEMO IV INFUSION 1 HR: CPT

## 2019-07-09 RX ORDER — 0.9 % SODIUM CHLORIDE 0.9 %
VIAL (ML) INJECTION
Status: DISCONTINUED
Start: 2019-07-09 | End: 2019-07-09

## 2019-07-09 RX ORDER — FLUOROURACIL 50 MG/ML
2400 INJECTION, SOLUTION INTRAVENOUS CONTINUOUS
Status: DISCONTINUED | OUTPATIENT
Start: 2019-07-09 | End: 2019-07-09

## 2019-07-09 RX ADMIN — FLUOROURACIL 6000 MG: 50 INJECTION, SOLUTION INTRAVENOUS at 12:57:00

## 2019-07-09 NOTE — PROGRESS NOTES
Pt here for C4D1 FOLFOX, Avastin. Arrives Ambulating independently, accompanied by Friend           Modifications in dose or schedule: No. Labs WNL today. UA collected and per [facility] ok to proceed without results.   UA resulted and no urine p

## 2019-07-11 ENCOUNTER — NURSE ONLY (OUTPATIENT)
Dept: HEMATOLOGY/ONCOLOGY | Facility: HOSPITAL | Age: 54
End: 2019-07-11
Attending: INTERNAL MEDICINE
Payer: COMMERCIAL

## 2019-07-11 DIAGNOSIS — C18.6 MALIGNANT NEOPLASM OF DESCENDING COLON (HCC): Primary | ICD-10-CM

## 2019-07-11 PROCEDURE — 96523 IRRIG DRUG DELIVERY DEVICE: CPT

## 2019-07-11 RX ORDER — HEPARIN SODIUM (PORCINE) LOCK FLUSH IV SOLN 100 UNIT/ML 100 UNIT/ML
5 SOLUTION INTRAVENOUS ONCE
Status: COMPLETED | OUTPATIENT
Start: 2019-07-11 | End: 2019-07-11

## 2019-07-11 RX ORDER — HEPARIN SODIUM (PORCINE) LOCK FLUSH IV SOLN 100 UNIT/ML 100 UNIT/ML
5 SOLUTION INTRAVENOUS ONCE
Status: CANCELLED | OUTPATIENT
Start: 2019-07-11

## 2019-07-11 RX ORDER — 0.9 % SODIUM CHLORIDE 0.9 %
10 VIAL (ML) INJECTION ONCE
Status: CANCELLED | OUTPATIENT
Start: 2019-07-11

## 2019-07-11 RX ORDER — 0.9 % SODIUM CHLORIDE 0.9 %
VIAL (ML) INJECTION
Status: DISCONTINUED
Start: 2019-07-11 | End: 2019-07-11

## 2019-07-11 RX ADMIN — HEPARIN SODIUM (PORCINE) LOCK FLUSH IV SOLN 100 UNIT/ML 500 UNITS: 100 SOLUTION INTRAVENOUS at 11:29:00

## 2019-07-22 ENCOUNTER — APPOINTMENT (OUTPATIENT)
Dept: HEMATOLOGY/ONCOLOGY | Facility: HOSPITAL | Age: 54
End: 2019-07-22
Attending: INTERNAL MEDICINE
Payer: COMMERCIAL

## 2019-07-22 ENCOUNTER — OFFICE VISIT (OUTPATIENT)
Dept: HEMATOLOGY/ONCOLOGY | Facility: HOSPITAL | Age: 54
End: 2019-07-22
Attending: PHYSICIAN ASSISTANT
Payer: COMMERCIAL

## 2019-07-22 VITALS
OXYGEN SATURATION: 99 % | HEIGHT: 74 IN | SYSTOLIC BLOOD PRESSURE: 157 MMHG | DIASTOLIC BLOOD PRESSURE: 96 MMHG | WEIGHT: 275 LBS | BODY MASS INDEX: 35.29 KG/M2 | TEMPERATURE: 98 F | RESPIRATION RATE: 18 BRPM | HEART RATE: 71 BPM

## 2019-07-22 DIAGNOSIS — C18.9 COLON CANCER METASTASIZED TO LIVER (HCC): Primary | ICD-10-CM

## 2019-07-22 DIAGNOSIS — Z51.11 CHEMOTHERAPY MANAGEMENT, ENCOUNTER FOR: ICD-10-CM

## 2019-07-22 DIAGNOSIS — C18.6 MALIGNANT NEOPLASM OF DESCENDING COLON (HCC): Primary | ICD-10-CM

## 2019-07-22 DIAGNOSIS — R25.2 MUSCLE CRAMPS: ICD-10-CM

## 2019-07-22 DIAGNOSIS — C78.7 COLON CANCER METASTASIZED TO LIVER (HCC): Primary | ICD-10-CM

## 2019-07-22 DIAGNOSIS — C18.6 MALIGNANT NEOPLASM OF DESCENDING COLON (HCC): ICD-10-CM

## 2019-07-22 LAB
ALBUMIN SERPL-MCNC: 3.4 G/DL (ref 3.4–5)
ALBUMIN/GLOB SERPL: 0.9 {RATIO} (ref 1–2)
ALP LIVER SERPL-CCNC: 104 U/L (ref 45–117)
ALT SERPL-CCNC: 23 U/L (ref 16–61)
ANION GAP SERPL CALC-SCNC: 7 MMOL/L (ref 0–18)
AST SERPL-CCNC: 15 U/L (ref 15–37)
BASOPHILS # BLD AUTO: 0.02 X10(3) UL (ref 0–0.2)
BASOPHILS NFR BLD AUTO: 0.5 %
BILIRUB SERPL-MCNC: 0.2 MG/DL (ref 0.1–2)
BUN BLD-MCNC: 15 MG/DL (ref 7–18)
BUN/CREAT SERPL: 14.2 (ref 10–20)
CALCIUM BLD-MCNC: 8.6 MG/DL (ref 8.5–10.1)
CHLORIDE SERPL-SCNC: 106 MMOL/L (ref 98–112)
CO2 SERPL-SCNC: 26 MMOL/L (ref 21–32)
CREAT BLD-MCNC: 1.06 MG/DL (ref 0.7–1.3)
DEPRECATED RDW RBC AUTO: 47.4 FL (ref 35.1–46.3)
EOSINOPHIL # BLD AUTO: 0.04 X10(3) UL (ref 0–0.7)
EOSINOPHIL NFR BLD AUTO: 1.1 %
ERYTHROCYTE [DISTWIDTH] IN BLOOD BY AUTOMATED COUNT: 15.3 % (ref 11–15)
GLOBULIN PLAS-MCNC: 4 G/DL (ref 2.8–4.4)
GLUCOSE BLD-MCNC: 99 MG/DL (ref 70–99)
HCT VFR BLD AUTO: 41.1 % (ref 39–53)
HGB BLD-MCNC: 12.9 G/DL (ref 13–17.5)
IMM GRANULOCYTES # BLD AUTO: 0 X10(3) UL (ref 0–1)
IMM GRANULOCYTES NFR BLD: 0 %
LYMPHOCYTES # BLD AUTO: 1.73 X10(3) UL (ref 1–4)
LYMPHOCYTES NFR BLD AUTO: 46.1 %
M PROTEIN MFR SERPL ELPH: 7.4 G/DL (ref 6.4–8.2)
MCH RBC QN AUTO: 27.2 PG (ref 26–34)
MCHC RBC AUTO-ENTMCNC: 31.4 G/DL (ref 31–37)
MCV RBC AUTO: 86.7 FL (ref 80–100)
MONOCYTES # BLD AUTO: 0.48 X10(3) UL (ref 0.1–1)
MONOCYTES NFR BLD AUTO: 12.8 %
NEUTROPHILS # BLD AUTO: 1.48 X10 (3) UL (ref 1.5–7.7)
NEUTROPHILS # BLD AUTO: 1.48 X10(3) UL (ref 1.5–7.7)
NEUTROPHILS NFR BLD AUTO: 39.5 %
OSMOLALITY SERPL CALC.SUM OF ELEC: 289 MOSM/KG (ref 275–295)
PATIENT FASTING: NO
PLATELET # BLD AUTO: 182 10(3)UL (ref 150–450)
POTASSIUM SERPL-SCNC: 3.6 MMOL/L (ref 3.5–5.1)
RBC # BLD AUTO: 4.74 X10(6)UL (ref 4.3–5.7)
SODIUM SERPL-SCNC: 139 MMOL/L (ref 136–145)
WBC # BLD AUTO: 3.8 X10(3) UL (ref 4–11)

## 2019-07-22 PROCEDURE — 36415 COLL VENOUS BLD VENIPUNCTURE: CPT

## 2019-07-22 PROCEDURE — 80053 COMPREHEN METABOLIC PANEL: CPT

## 2019-07-22 PROCEDURE — 83735 ASSAY OF MAGNESIUM: CPT

## 2019-07-22 PROCEDURE — 99214 OFFICE O/P EST MOD 30 MIN: CPT | Performed by: PHYSICIAN ASSISTANT

## 2019-07-22 PROCEDURE — 85025 COMPLETE CBC W/AUTO DIFF WBC: CPT

## 2019-07-22 RX ORDER — FLUOROURACIL 50 MG/ML
2400 INJECTION, SOLUTION INTRAVENOUS CONTINUOUS
Status: CANCELLED | OUTPATIENT
Start: 2019-07-23

## 2019-07-23 ENCOUNTER — OFFICE VISIT (OUTPATIENT)
Dept: HEMATOLOGY/ONCOLOGY | Facility: HOSPITAL | Age: 54
End: 2019-07-23
Attending: INTERNAL MEDICINE
Payer: COMMERCIAL

## 2019-07-23 ENCOUNTER — TELEPHONE (OUTPATIENT)
Dept: HEMATOLOGY/ONCOLOGY | Facility: HOSPITAL | Age: 54
End: 2019-07-23

## 2019-07-23 VITALS
TEMPERATURE: 99 F | RESPIRATION RATE: 16 BRPM | DIASTOLIC BLOOD PRESSURE: 90 MMHG | OXYGEN SATURATION: 98 % | HEART RATE: 76 BPM | SYSTOLIC BLOOD PRESSURE: 148 MMHG

## 2019-07-23 DIAGNOSIS — C18.6 MALIGNANT NEOPLASM OF DESCENDING COLON (HCC): Primary | ICD-10-CM

## 2019-07-23 DIAGNOSIS — R25.2 MUSCLE CRAMPS: Primary | ICD-10-CM

## 2019-07-23 PROBLEM — Z51.11 CHEMOTHERAPY MANAGEMENT, ENCOUNTER FOR: Status: ACTIVE | Noted: 2019-07-23

## 2019-07-23 LAB — HAV IGM SER QL: 2.4 MG/DL (ref 1.6–2.6)

## 2019-07-23 PROCEDURE — 96413 CHEMO IV INFUSION 1 HR: CPT

## 2019-07-23 PROCEDURE — 96417 CHEMO IV INFUS EACH ADDL SEQ: CPT

## 2019-07-23 PROCEDURE — 96375 TX/PRO/DX INJ NEW DRUG ADDON: CPT

## 2019-07-23 PROCEDURE — 96415 CHEMO IV INFUSION ADDL HR: CPT

## 2019-07-23 PROCEDURE — 96368 THER/DIAG CONCURRENT INF: CPT

## 2019-07-23 RX ORDER — 0.9 % SODIUM CHLORIDE 0.9 %
VIAL (ML) INJECTION
Status: DISCONTINUED
Start: 2019-07-23 | End: 2019-07-23

## 2019-07-23 RX ORDER — FLUOROURACIL 50 MG/ML
2400 INJECTION, SOLUTION INTRAVENOUS CONTINUOUS
Status: DISCONTINUED | OUTPATIENT
Start: 2019-07-23 | End: 2019-07-23

## 2019-07-23 RX ADMIN — FLUOROURACIL 6000 MG: 50 INJECTION, SOLUTION INTRAVENOUS at 12:45:00

## 2019-07-23 NOTE — PROGRESS NOTES
Cancer Center Progress Note    Patient Name: Karen Gill   YOB: 1965   Medical Record Number: [id number]   CSN: [id number]   Consulting Physician: Eilcia High MD  Referring Physician(s): Elicia High  Date of Visit: 7/08/2019     Chief current colonoscopy findings. Patient continues to feel well without any symptoms of disease or systemic signs of illness.   Denies any bleeding or bruising symptoms, no chest pain, no dyspnea, no nausea, vomiting abdominal pain, no change in activity lon Not on file      Number of children: 4      Years of education: Not on file      Highest education level: Not on file    Occupational History      Occupation: shipping and rec.      Social Needs      Financial resource strain: Not on file      Food insecuri 32-21. He works in  for [employer]. He lives his [place] in [facility] [place] [facility]. He formerly was a , enjoys time with his family, and watching basketball.        Allergies:     Dust Mite Extract       UNKNOWN arthralgias, muscle weakness. Neurological: Negative for headaches, dizziness, speech problems, gait problems and weakness. +transient cold induced neuropathy    A comprehensive 14 point review of systems was completed.   Pertinent positives and negatives K 3.6 07/22/2019 12:23 PM     07/22/2019 12:23 PM    CO2 26.0 07/22/2019 12:23 PM    ALKPHO 104 07/22/2019 12:23 PM    AST 15 07/22/2019 12:23 PM    ALT 23 07/22/2019 12:23 PM       Imaging:    N/A        Impression:    (C18.9,  C78.7) Colon cance bevacizumab for 6 months prior to surgical resection of the primary colonic mass and regional lymph node evaluation    --Pretreatment CEA is not elevated at 1.9 ng/mL; discussed will need to follow trends closely in light of his normal value result in the

## 2019-07-23 NOTE — TELEPHONE ENCOUNTER
I called Jessica Harris to let him know that BASIL Fitzpatrick is going to add a magnesium to the labs that were drawn yesterday. Jessica Harris reports he tried drinking the warmed gatorade & it went well. I asked him to continue drinking the gatorade (64-80 oz). He agreed.

## 2019-07-23 NOTE — TELEPHONE ENCOUNTER
Toxicities:  C5D1 Oxaliplatin/Leukovorian Calcium/Bevacizumab/Fluorouracil today    Muscle Cramps    Muscle Cramps: (Pt has had 3 episodes of muscle cramps in both hands since chemo today. The pain makes it hard to open & close his fists.  It lasts for a fe

## 2019-07-23 NOTE — PROGRESS NOTES
Pt here for C5D1 FOLFOX, Avastin. Arrives Ambulating independently, accompanied by Self           Modifications in dose or schedule: No. May proceed with ANC of 1.48    Patient reports he is feeling ok. He does have fatigue and occasional nausea.  He soes

## 2019-07-24 NOTE — TELEPHONE ENCOUNTER
I called Mary Jo Gustafson to update him that his magnesium level came back normal. He reports that the muscle cramps have completely resolved after pushing gattorade. He is continuing to drink it & plenty of other caffeine free fluids today.  I told him I would krys

## 2019-07-25 ENCOUNTER — NURSE ONLY (OUTPATIENT)
Dept: HEMATOLOGY/ONCOLOGY | Facility: HOSPITAL | Age: 54
End: 2019-07-25
Attending: INTERNAL MEDICINE
Payer: COMMERCIAL

## 2019-07-25 DIAGNOSIS — C18.6 MALIGNANT NEOPLASM OF DESCENDING COLON (HCC): Primary | ICD-10-CM

## 2019-07-25 PROCEDURE — 96523 IRRIG DRUG DELIVERY DEVICE: CPT

## 2019-07-25 RX ORDER — HEPARIN SODIUM (PORCINE) LOCK FLUSH IV SOLN 100 UNIT/ML 100 UNIT/ML
5 SOLUTION INTRAVENOUS ONCE
Status: CANCELLED | OUTPATIENT
Start: 2019-07-25

## 2019-07-25 RX ORDER — 0.9 % SODIUM CHLORIDE 0.9 %
10 VIAL (ML) INJECTION ONCE
Status: CANCELLED | OUTPATIENT
Start: 2019-07-25

## 2019-07-25 RX ORDER — HEPARIN SODIUM (PORCINE) LOCK FLUSH IV SOLN 100 UNIT/ML 100 UNIT/ML
5 SOLUTION INTRAVENOUS ONCE
Status: COMPLETED | OUTPATIENT
Start: 2019-07-25 | End: 2019-07-25

## 2019-07-25 RX ADMIN — HEPARIN SODIUM (PORCINE) LOCK FLUSH IV SOLN 100 UNIT/ML 500 UNITS: 100 SOLUTION INTRAVENOUS at 11:29:00

## 2019-08-05 ENCOUNTER — OFFICE VISIT (OUTPATIENT)
Dept: HEMATOLOGY/ONCOLOGY | Facility: HOSPITAL | Age: 54
End: 2019-08-05
Attending: INTERNAL MEDICINE
Payer: COMMERCIAL

## 2019-08-05 VITALS
DIASTOLIC BLOOD PRESSURE: 87 MMHG | TEMPERATURE: 98 F | WEIGHT: 273 LBS | OXYGEN SATURATION: 99 % | HEIGHT: 74 IN | SYSTOLIC BLOOD PRESSURE: 156 MMHG | RESPIRATION RATE: 16 BRPM | HEART RATE: 64 BPM | BODY MASS INDEX: 35.04 KG/M2

## 2019-08-05 DIAGNOSIS — Z51.11 ENCOUNTER FOR CHEMOTHERAPY MANAGEMENT: ICD-10-CM

## 2019-08-05 DIAGNOSIS — C18.6 MALIGNANT NEOPLASM OF DESCENDING COLON (HCC): Primary | ICD-10-CM

## 2019-08-05 DIAGNOSIS — C18.9 COLON CANCER METASTASIZED TO LIVER (HCC): ICD-10-CM

## 2019-08-05 DIAGNOSIS — C78.7 COLON CANCER METASTASIZED TO LIVER (HCC): ICD-10-CM

## 2019-08-05 LAB
ALBUMIN SERPL-MCNC: 3.5 G/DL (ref 3.4–5)
ALBUMIN/GLOB SERPL: 0.9 {RATIO} (ref 1–2)
ALP LIVER SERPL-CCNC: 113 U/L (ref 45–117)
ALT SERPL-CCNC: 25 U/L (ref 16–61)
ANION GAP SERPL CALC-SCNC: 8 MMOL/L (ref 0–18)
AST SERPL-CCNC: 18 U/L (ref 15–37)
BASOPHILS # BLD AUTO: 0.01 X10(3) UL (ref 0–0.2)
BASOPHILS NFR BLD AUTO: 0.3 %
BILIRUB SERPL-MCNC: 0.2 MG/DL (ref 0.1–2)
BUN BLD-MCNC: 15 MG/DL (ref 7–18)
BUN/CREAT SERPL: 11.8 (ref 10–20)
CALCIUM BLD-MCNC: 9 MG/DL (ref 8.5–10.1)
CHLORIDE SERPL-SCNC: 109 MMOL/L (ref 98–112)
CO2 SERPL-SCNC: 25 MMOL/L (ref 21–32)
CREAT BLD-MCNC: 1.27 MG/DL (ref 0.7–1.3)
DEPRECATED RDW RBC AUTO: 48.3 FL (ref 35.1–46.3)
EOSINOPHIL # BLD AUTO: 0.06 X10(3) UL (ref 0–0.7)
EOSINOPHIL NFR BLD AUTO: 1.8 %
ERYTHROCYTE [DISTWIDTH] IN BLOOD BY AUTOMATED COUNT: 15.5 % (ref 11–15)
GLOBULIN PLAS-MCNC: 3.8 G/DL (ref 2.8–4.4)
GLUCOSE BLD-MCNC: 109 MG/DL (ref 70–99)
HCT VFR BLD AUTO: 39.9 % (ref 39–53)
HGB BLD-MCNC: 12.5 G/DL (ref 13–17.5)
IMM GRANULOCYTES # BLD AUTO: 0 X10(3) UL (ref 0–1)
IMM GRANULOCYTES NFR BLD: 0 %
LYMPHOCYTES # BLD AUTO: 1.77 X10(3) UL (ref 1–4)
LYMPHOCYTES NFR BLD AUTO: 54.3 %
M PROTEIN MFR SERPL ELPH: 7.3 G/DL (ref 6.4–8.2)
MCH RBC QN AUTO: 27.1 PG (ref 26–34)
MCHC RBC AUTO-ENTMCNC: 31.3 G/DL (ref 31–37)
MCV RBC AUTO: 86.4 FL (ref 80–100)
MONOCYTES # BLD AUTO: 0.36 X10(3) UL (ref 0.1–1)
MONOCYTES NFR BLD AUTO: 11 %
NEUTROPHILS # BLD AUTO: 1.06 X10 (3) UL (ref 1.5–7.7)
NEUTROPHILS # BLD AUTO: 1.06 X10(3) UL (ref 1.5–7.7)
NEUTROPHILS NFR BLD AUTO: 32.6 %
OSMOLALITY SERPL CALC.SUM OF ELEC: 295 MOSM/KG (ref 275–295)
PATIENT FASTING: NO
PLATELET # BLD AUTO: 148 10(3)UL (ref 150–450)
POTASSIUM SERPL-SCNC: 3.2 MMOL/L (ref 3.5–5.1)
RBC # BLD AUTO: 4.62 X10(6)UL (ref 4.3–5.7)
SODIUM SERPL-SCNC: 142 MMOL/L (ref 136–145)
WBC # BLD AUTO: 3.3 X10(3) UL (ref 4–11)

## 2019-08-05 PROCEDURE — 85025 COMPLETE CBC W/AUTO DIFF WBC: CPT

## 2019-08-05 PROCEDURE — 80053 COMPREHEN METABOLIC PANEL: CPT

## 2019-08-05 PROCEDURE — 99215 OFFICE O/P EST HI 40 MIN: CPT | Performed by: INTERNAL MEDICINE

## 2019-08-05 PROCEDURE — 36415 COLL VENOUS BLD VENIPUNCTURE: CPT

## 2019-08-05 RX ORDER — FLUOROURACIL 50 MG/ML
2400 INJECTION, SOLUTION INTRAVENOUS CONTINUOUS
Status: CANCELLED | OUTPATIENT
Start: 2019-08-20

## 2019-08-05 NOTE — PROGRESS NOTES
Cancer Center Progress Note    Patient Name: Ronnald Canavan   YOB: 1965   Medical Record Number: [id number]   CSN: [id number]   Consulting Physician: Sherry Gunn MD  Referring Physician(s): Nestor Goodpasture  Date of Visit: 8/05/2019     Chief current colonoscopy findings. Patient continues to feel well without any symptoms of disease or systemic signs of illness.   Denies any bleeding or bruising symptoms, no chest pain, no dyspnea, no nausea, vomiting abdominal pain, no change in activity lon file      Highest education level: Not on file    Occupational History      Occupation: shipping and rec.      Social Needs      Financial resource strain: Not on file      Food insecurity:        Worry: Not on file        Inability: Not on file      Transp He lives his [place] in [place], [place]. He formerly was a , enjoys time with his family, and watching basketball.        Allergies:     Dust Mite Extract       UNKNOWN    Current Medications:    AMLODIPINE BESYLATE 5 MG Oral Tab TAKE 1 TABLET B positives and negatives noted in the the HPI.      Vital Signs:  /87 (BP Location: Left arm, Patient Position: Sitting, Cuff Size: large)   Pulse 64   Temp 98.2 °F (36.8 °C) (Oral)   Resp 16   Ht 1.88 m (6' 2\")   Wt 123.8 kg (273 lb)   SpO2 99%   BMI Imaging:    N/A        Impression:    (C18.6) Malignant neoplasm of descending colon (HCC)  (primary encounter diagnosis)    (C18.9,  C78.7) [facility] cancer [facility]asized to liver ([facility]    (Z51.11) Encounter for chemotherapy management    47year old M w cycle 2 & all subsequent cycles)       2.) hx of chronic low back pain    ---noted and unchanged per his report; only occasional symptoms      3.) GERD symptoms/voice changes  --improved with omeprazole    4.) Anxiety about health    ---pt has occasional t

## 2019-08-06 ENCOUNTER — OFFICE VISIT (OUTPATIENT)
Dept: HEMATOLOGY/ONCOLOGY | Facility: HOSPITAL | Age: 54
End: 2019-08-06
Attending: INTERNAL MEDICINE
Payer: COMMERCIAL

## 2019-08-06 VITALS
TEMPERATURE: 98 F | SYSTOLIC BLOOD PRESSURE: 145 MMHG | HEART RATE: 79 BPM | OXYGEN SATURATION: 97 % | DIASTOLIC BLOOD PRESSURE: 97 MMHG | RESPIRATION RATE: 16 BRPM

## 2019-08-06 NOTE — PROGRESS NOTES
Marielena García Rn and Dr. Lizabeth Valentino of patient's [address] [place] [address] Way of  1.06. Patient not to receive treatment today. Patient brought to infusion and Dr. Lizabeth Valentino saw patient to explain. Geoffrey Marcial reports his has not had any recent infections or fevers.  Neutropenic precaution

## 2019-08-08 ENCOUNTER — APPOINTMENT (OUTPATIENT)
Dept: HEMATOLOGY/ONCOLOGY | Facility: HOSPITAL | Age: 54
End: 2019-08-08
Attending: INTERNAL MEDICINE
Payer: COMMERCIAL

## 2019-08-13 ENCOUNTER — NURSE ONLY (OUTPATIENT)
Dept: HEMATOLOGY/ONCOLOGY | Facility: HOSPITAL | Age: 54
End: 2019-08-13
Attending: INTERNAL MEDICINE
Payer: COMMERCIAL

## 2019-08-13 DIAGNOSIS — C18.6 MALIGNANT NEOPLASM OF DESCENDING COLON (HCC): Primary | ICD-10-CM

## 2019-08-13 LAB
ALBUMIN SERPL-MCNC: 3.2 G/DL (ref 3.4–5)
ALBUMIN/GLOB SERPL: 0.8 {RATIO} (ref 1–2)
ALP LIVER SERPL-CCNC: 100 U/L (ref 45–117)
ALT SERPL-CCNC: 23 U/L (ref 16–61)
ANION GAP SERPL CALC-SCNC: 5 MMOL/L (ref 0–18)
AST SERPL-CCNC: 18 U/L (ref 15–37)
BASOPHILS # BLD AUTO: 0.02 X10(3) UL (ref 0–0.2)
BASOPHILS NFR BLD AUTO: 0.6 %
BILIRUB SERPL-MCNC: 0.2 MG/DL (ref 0.1–2)
BUN BLD-MCNC: 18 MG/DL (ref 7–18)
BUN/CREAT SERPL: 14.9 (ref 10–20)
CALCIUM BLD-MCNC: 8.5 MG/DL (ref 8.5–10.1)
CHLORIDE SERPL-SCNC: 109 MMOL/L (ref 98–112)
CO2 SERPL-SCNC: 28 MMOL/L (ref 21–32)
CREAT BLD-MCNC: 1.21 MG/DL (ref 0.7–1.3)
DEPRECATED RDW RBC AUTO: 52.3 FL (ref 35.1–46.3)
EOSINOPHIL # BLD AUTO: 0.04 X10(3) UL (ref 0–0.7)
EOSINOPHIL NFR BLD AUTO: 1.1 %
ERYTHROCYTE [DISTWIDTH] IN BLOOD BY AUTOMATED COUNT: 16.1 % (ref 11–15)
GLOBULIN PLAS-MCNC: 3.8 G/DL (ref 2.8–4.4)
GLUCOSE BLD-MCNC: 102 MG/DL (ref 70–99)
HCT VFR BLD AUTO: 41 % (ref 39–53)
HGB BLD-MCNC: 12.7 G/DL (ref 13–17.5)
IMM GRANULOCYTES # BLD AUTO: 0.01 X10(3) UL (ref 0–1)
IMM GRANULOCYTES NFR BLD: 0.3 %
LYMPHOCYTES # BLD AUTO: 1.96 X10(3) UL (ref 1–4)
LYMPHOCYTES NFR BLD AUTO: 54.9 %
M PROTEIN MFR SERPL ELPH: 7 G/DL (ref 6.4–8.2)
MCH RBC QN AUTO: 27.7 PG (ref 26–34)
MCHC RBC AUTO-ENTMCNC: 31 G/DL (ref 31–37)
MCV RBC AUTO: 89.3 FL (ref 80–100)
MONOCYTES # BLD AUTO: 0.45 X10(3) UL (ref 0.1–1)
MONOCYTES NFR BLD AUTO: 12.6 %
NEUTROPHILS # BLD AUTO: 1.09 X10 (3) UL (ref 1.5–7.7)
NEUTROPHILS # BLD AUTO: 1.09 X10(3) UL (ref 1.5–7.7)
NEUTROPHILS NFR BLD AUTO: 30.5 %
OSMOLALITY SERPL CALC.SUM OF ELEC: 296 MOSM/KG (ref 275–295)
PATIENT FASTING: NO
PLATELET # BLD AUTO: 200 10(3)UL (ref 150–450)
POTASSIUM SERPL-SCNC: 4 MMOL/L (ref 3.5–5.1)
RBC # BLD AUTO: 4.59 X10(6)UL (ref 4.3–5.7)
SODIUM SERPL-SCNC: 142 MMOL/L (ref 136–145)
WBC # BLD AUTO: 3.6 X10(3) UL (ref 4–11)

## 2019-08-13 PROCEDURE — 85025 COMPLETE CBC W/AUTO DIFF WBC: CPT

## 2019-08-13 PROCEDURE — 80053 COMPREHEN METABOLIC PANEL: CPT

## 2019-08-13 PROCEDURE — 36591 DRAW BLOOD OFF VENOUS DEVICE: CPT

## 2019-08-13 RX ORDER — HEPARIN SODIUM (PORCINE) LOCK FLUSH IV SOLN 100 UNIT/ML 100 UNIT/ML
5 SOLUTION INTRAVENOUS ONCE
Status: COMPLETED | OUTPATIENT
Start: 2019-08-13 | End: 2019-08-13

## 2019-08-13 RX ORDER — HEPARIN SODIUM (PORCINE) LOCK FLUSH IV SOLN 100 UNIT/ML 100 UNIT/ML
SOLUTION INTRAVENOUS
Status: DISCONTINUED
Start: 2019-08-13 | End: 2019-08-13

## 2019-08-13 RX ORDER — 0.9 % SODIUM CHLORIDE 0.9 %
VIAL (ML) INJECTION
Status: DISCONTINUED
Start: 2019-08-13 | End: 2019-08-13

## 2019-08-13 RX ORDER — 0.9 % SODIUM CHLORIDE 0.9 %
10 VIAL (ML) INJECTION ONCE
Status: CANCELLED | OUTPATIENT
Start: 2019-08-13

## 2019-08-13 RX ORDER — HEPARIN SODIUM (PORCINE) LOCK FLUSH IV SOLN 100 UNIT/ML 100 UNIT/ML
5 SOLUTION INTRAVENOUS ONCE
Status: CANCELLED | OUTPATIENT
Start: 2019-08-13

## 2019-08-13 RX ADMIN — HEPARIN SODIUM (PORCINE) LOCK FLUSH IV SOLN 100 UNIT/ML 500 UNITS: 100 SOLUTION INTRAVENOUS at 09:30:00

## 2019-08-13 NOTE — PROGRESS NOTES
Patient to center for repeat labs. Malia Domingo ws deferred 1 week due to [address] 1.06   ANC 1.09 today  Per Dr. Lizabeth Valentino, we will defer patient 1 more week. Malia Domingo will return for repeat labs and possible chemo 8/20. Patient states he is feeling well.  Offers no

## 2019-08-15 ENCOUNTER — APPOINTMENT (OUTPATIENT)
Dept: HEMATOLOGY/ONCOLOGY | Facility: HOSPITAL | Age: 54
End: 2019-08-15
Attending: INTERNAL MEDICINE
Payer: COMMERCIAL

## 2019-08-19 ENCOUNTER — NURSE ONLY (OUTPATIENT)
Dept: HEMATOLOGY/ONCOLOGY | Facility: HOSPITAL | Age: 54
End: 2019-08-19
Attending: INTERNAL MEDICINE
Payer: COMMERCIAL

## 2019-08-19 DIAGNOSIS — C18.9 COLON CANCER (HCC): ICD-10-CM

## 2019-08-19 LAB
ALBUMIN SERPL-MCNC: 3.6 G/DL (ref 3.4–5)
ALBUMIN/GLOB SERPL: 0.9 {RATIO} (ref 1–2)
ALP LIVER SERPL-CCNC: 105 U/L (ref 45–117)
ALT SERPL-CCNC: 25 U/L (ref 16–61)
ANION GAP SERPL CALC-SCNC: 7 MMOL/L (ref 0–18)
AST SERPL-CCNC: 19 U/L (ref 15–37)
BASOPHILS # BLD AUTO: 0.03 X10(3) UL (ref 0–0.2)
BASOPHILS NFR BLD AUTO: 0.6 %
BILIRUB SERPL-MCNC: 0.2 MG/DL (ref 0.1–2)
BUN BLD-MCNC: 16 MG/DL (ref 7–18)
BUN/CREAT SERPL: 12.7 (ref 10–20)
CALCIUM BLD-MCNC: 9.2 MG/DL (ref 8.5–10.1)
CHLORIDE SERPL-SCNC: 107 MMOL/L (ref 98–112)
CO2 SERPL-SCNC: 25 MMOL/L (ref 21–32)
CREAT BLD-MCNC: 1.26 MG/DL (ref 0.7–1.3)
DEPRECATED RDW RBC AUTO: 51.3 FL (ref 35.1–46.3)
EOSINOPHIL # BLD AUTO: 0.04 X10(3) UL (ref 0–0.7)
EOSINOPHIL NFR BLD AUTO: 0.8 %
ERYTHROCYTE [DISTWIDTH] IN BLOOD BY AUTOMATED COUNT: 15.8 % (ref 11–15)
GLOBULIN PLAS-MCNC: 4.2 G/DL (ref 2.8–4.4)
GLUCOSE BLD-MCNC: 83 MG/DL (ref 70–99)
HCT VFR BLD AUTO: 42 % (ref 39–53)
HGB BLD-MCNC: 13.2 G/DL (ref 13–17.5)
IMM GRANULOCYTES # BLD AUTO: 0.03 X10(3) UL (ref 0–1)
IMM GRANULOCYTES NFR BLD: 0.6 %
LYMPHOCYTES # BLD AUTO: 2.36 X10(3) UL (ref 1–4)
LYMPHOCYTES NFR BLD AUTO: 47.3 %
M PROTEIN MFR SERPL ELPH: 7.8 G/DL (ref 6.4–8.2)
MCH RBC QN AUTO: 27.8 PG (ref 26–34)
MCHC RBC AUTO-ENTMCNC: 31.4 G/DL (ref 31–37)
MCV RBC AUTO: 88.4 FL (ref 80–100)
MONOCYTES # BLD AUTO: 0.49 X10(3) UL (ref 0.1–1)
MONOCYTES NFR BLD AUTO: 9.8 %
NEUTROPHILS # BLD AUTO: 2.04 X10 (3) UL (ref 1.5–7.7)
NEUTROPHILS # BLD AUTO: 2.04 X10(3) UL (ref 1.5–7.7)
NEUTROPHILS NFR BLD AUTO: 40.9 %
OSMOLALITY SERPL CALC.SUM OF ELEC: 288 MOSM/KG (ref 275–295)
PATIENT FASTING: NO
PLATELET # BLD AUTO: 181 10(3)UL (ref 150–450)
POTASSIUM SERPL-SCNC: 4 MMOL/L (ref 3.5–5.1)
RBC # BLD AUTO: 4.75 X10(6)UL (ref 4.3–5.7)
SODIUM SERPL-SCNC: 139 MMOL/L (ref 136–145)
WBC # BLD AUTO: 5 X10(3) UL (ref 4–11)

## 2019-08-19 PROCEDURE — 36415 COLL VENOUS BLD VENIPUNCTURE: CPT

## 2019-08-19 PROCEDURE — 85025 COMPLETE CBC W/AUTO DIFF WBC: CPT

## 2019-08-19 PROCEDURE — 80053 COMPREHEN METABOLIC PANEL: CPT

## 2019-08-20 ENCOUNTER — OFFICE VISIT (OUTPATIENT)
Dept: HEMATOLOGY/ONCOLOGY | Facility: HOSPITAL | Age: 54
End: 2019-08-20
Attending: INTERNAL MEDICINE
Payer: COMMERCIAL

## 2019-08-20 VITALS
SYSTOLIC BLOOD PRESSURE: 158 MMHG | TEMPERATURE: 99 F | OXYGEN SATURATION: 99 % | HEART RATE: 70 BPM | RESPIRATION RATE: 16 BRPM | DIASTOLIC BLOOD PRESSURE: 90 MMHG

## 2019-08-20 DIAGNOSIS — C18.6 MALIGNANT NEOPLASM OF DESCENDING COLON (HCC): Primary | ICD-10-CM

## 2019-08-20 PROCEDURE — 96368 THER/DIAG CONCURRENT INF: CPT

## 2019-08-20 PROCEDURE — 96375 TX/PRO/DX INJ NEW DRUG ADDON: CPT

## 2019-08-20 PROCEDURE — 96415 CHEMO IV INFUSION ADDL HR: CPT

## 2019-08-20 PROCEDURE — 96413 CHEMO IV INFUSION 1 HR: CPT

## 2019-08-20 PROCEDURE — 96417 CHEMO IV INFUS EACH ADDL SEQ: CPT

## 2019-08-20 RX ORDER — 0.9 % SODIUM CHLORIDE 0.9 %
VIAL (ML) INJECTION
Status: DISCONTINUED
Start: 2019-08-20 | End: 2019-08-20

## 2019-08-20 RX ORDER — FLUOROURACIL 50 MG/ML
2400 INJECTION, SOLUTION INTRAVENOUS CONTINUOUS
Status: DISCONTINUED | OUTPATIENT
Start: 2019-08-20 | End: 2019-08-20

## 2019-08-20 RX ADMIN — FLUOROURACIL 5950 MG: 50 INJECTION, SOLUTION INTRAVENOUS at 14:18:00

## 2019-08-20 NOTE — PROGRESS NOTES
Pt here for C6D1 FOLFOX, Avastin. Arrives Ambulating independently, accompanied by Self           Modifications in dose or schedule: Yes treatment has been delayed due to neutropenia the past two weeks. Labs WNL at this time.      Patient reports he is fee

## 2019-08-22 ENCOUNTER — NURSE ONLY (OUTPATIENT)
Dept: HEMATOLOGY/ONCOLOGY | Facility: HOSPITAL | Age: 54
End: 2019-08-22
Attending: INTERNAL MEDICINE
Payer: COMMERCIAL

## 2019-08-22 DIAGNOSIS — C18.6 MALIGNANT NEOPLASM OF DESCENDING COLON (HCC): Primary | ICD-10-CM

## 2019-08-22 PROCEDURE — 96523 IRRIG DRUG DELIVERY DEVICE: CPT

## 2019-08-22 RX ORDER — HEPARIN SODIUM (PORCINE) LOCK FLUSH IV SOLN 100 UNIT/ML 100 UNIT/ML
5 SOLUTION INTRAVENOUS ONCE
Status: CANCELLED | OUTPATIENT
Start: 2019-08-22

## 2019-08-22 RX ORDER — 0.9 % SODIUM CHLORIDE 0.9 %
10 VIAL (ML) INJECTION ONCE
Status: CANCELLED | OUTPATIENT
Start: 2019-08-22

## 2019-08-22 RX ORDER — HEPARIN SODIUM (PORCINE) LOCK FLUSH IV SOLN 100 UNIT/ML 100 UNIT/ML
5 SOLUTION INTRAVENOUS ONCE
Status: COMPLETED | OUTPATIENT
Start: 2019-08-22 | End: 2019-08-22

## 2019-08-22 RX ADMIN — HEPARIN SODIUM (PORCINE) LOCK FLUSH IV SOLN 100 UNIT/ML 500 UNITS: 100 SOLUTION INTRAVENOUS at 11:11:00

## 2019-08-22 NOTE — PROGRESS NOTES
Patient presented with CADD pump connected. Reservoir with 2.4ml left. Pt does not wish to return later for removal. Instructed on how he can tell on the pump when it is time for removal. Voices understanding. .. Disconnected CADD pump, flushed port with 0.

## 2019-08-26 ENCOUNTER — APPOINTMENT (OUTPATIENT)
Dept: HEMATOLOGY/ONCOLOGY | Facility: HOSPITAL | Age: 54
End: 2019-08-26
Attending: INTERNAL MEDICINE
Payer: COMMERCIAL

## 2019-08-27 ENCOUNTER — APPOINTMENT (OUTPATIENT)
Dept: HEMATOLOGY/ONCOLOGY | Facility: HOSPITAL | Age: 54
End: 2019-08-27
Attending: INTERNAL MEDICINE
Payer: COMMERCIAL

## 2019-09-03 ENCOUNTER — APPOINTMENT (OUTPATIENT)
Dept: HEMATOLOGY/ONCOLOGY | Facility: HOSPITAL | Age: 54
End: 2019-09-03
Attending: INTERNAL MEDICINE
Payer: COMMERCIAL

## 2019-09-03 VITALS
TEMPERATURE: 98 F | RESPIRATION RATE: 16 BRPM | OXYGEN SATURATION: 98 % | WEIGHT: 272 LBS | DIASTOLIC BLOOD PRESSURE: 93 MMHG | SYSTOLIC BLOOD PRESSURE: 152 MMHG | HEIGHT: 74 IN | HEART RATE: 74 BPM | BODY MASS INDEX: 34.91 KG/M2

## 2019-09-03 VITALS
WEIGHT: 272 LBS | SYSTOLIC BLOOD PRESSURE: 152 MMHG | HEART RATE: 74 BPM | HEIGHT: 74 IN | RESPIRATION RATE: 16 BRPM | TEMPERATURE: 98 F | DIASTOLIC BLOOD PRESSURE: 93 MMHG | OXYGEN SATURATION: 98 % | BODY MASS INDEX: 34.91 KG/M2

## 2019-09-03 DIAGNOSIS — C18.9 COLON CANCER METASTASIZED TO LIVER (HCC): ICD-10-CM

## 2019-09-03 DIAGNOSIS — C78.7 COLON CANCER METASTASIZED TO LIVER (HCC): ICD-10-CM

## 2019-09-03 DIAGNOSIS — C18.6 MALIGNANT NEOPLASM OF DESCENDING COLON (HCC): Primary | ICD-10-CM

## 2019-09-03 DIAGNOSIS — C18.6 MALIGNANT NEOPLASM OF DESCENDING COLON (HCC): ICD-10-CM

## 2019-09-03 DIAGNOSIS — Z51.11 ENCOUNTER FOR CHEMOTHERAPY MANAGEMENT: Primary | ICD-10-CM

## 2019-09-03 LAB
ALBUMIN SERPL-MCNC: 3.3 G/DL (ref 3.4–5)
ALBUMIN/GLOB SERPL: 0.8 {RATIO} (ref 1–2)
ALP LIVER SERPL-CCNC: 102 U/L (ref 45–117)
ALT SERPL-CCNC: 18 U/L (ref 16–61)
ANION GAP SERPL CALC-SCNC: 7 MMOL/L (ref 0–18)
AST SERPL-CCNC: 21 U/L (ref 15–37)
BACTERIA UR QL AUTO: NEGATIVE /HPF
BASOPHILS # BLD AUTO: 0.01 X10(3) UL (ref 0–0.2)
BASOPHILS NFR BLD AUTO: 0.3 %
BILIRUB SERPL-MCNC: 0.2 MG/DL (ref 0.1–2)
BILIRUB UR QL: NEGATIVE
BUN BLD-MCNC: 14 MG/DL (ref 7–18)
BUN/CREAT SERPL: 11.5 (ref 10–20)
CALCIUM BLD-MCNC: 8.4 MG/DL (ref 8.5–10.1)
CHLORIDE SERPL-SCNC: 108 MMOL/L (ref 98–112)
CLARITY UR: CLEAR
CO2 SERPL-SCNC: 26 MMOL/L (ref 21–32)
COLOR UR: YELLOW
CREAT BLD-MCNC: 1.22 MG/DL (ref 0.7–1.3)
DEPRECATED RDW RBC AUTO: 50.1 FL (ref 35.1–46.3)
EOSINOPHIL # BLD AUTO: 0.03 X10(3) UL (ref 0–0.7)
EOSINOPHIL NFR BLD AUTO: 0.9 %
ERYTHROCYTE [DISTWIDTH] IN BLOOD BY AUTOMATED COUNT: 15.3 % (ref 11–15)
GLOBULIN PLAS-MCNC: 4 G/DL (ref 2.8–4.4)
GLUCOSE BLD-MCNC: 120 MG/DL (ref 70–99)
GLUCOSE UR-MCNC: NEGATIVE MG/DL
HCT VFR BLD AUTO: 38.5 % (ref 39–53)
HGB BLD-MCNC: 12.2 G/DL (ref 13–17.5)
HYALINE CASTS #/AREA URNS AUTO: 1 /LPF
IMM GRANULOCYTES # BLD AUTO: 0.01 X10(3) UL (ref 0–1)
IMM GRANULOCYTES NFR BLD: 0.3 %
KETONES UR-MCNC: NEGATIVE MG/DL
LEUKOCYTE ESTERASE UR QL STRIP.AUTO: NEGATIVE
LYMPHOCYTES # BLD AUTO: 1.91 X10(3) UL (ref 1–4)
LYMPHOCYTES NFR BLD AUTO: 59.5 %
M PROTEIN MFR SERPL ELPH: 7.3 G/DL (ref 6.4–8.2)
MCH RBC QN AUTO: 28.3 PG (ref 26–34)
MCHC RBC AUTO-ENTMCNC: 31.7 G/DL (ref 31–37)
MCV RBC AUTO: 89.3 FL (ref 80–100)
MONOCYTES # BLD AUTO: 0.26 X10(3) UL (ref 0.1–1)
MONOCYTES NFR BLD AUTO: 8.1 %
NEUTROPHILS # BLD AUTO: 0.99 X10 (3) UL (ref 1.5–7.7)
NEUTROPHILS # BLD AUTO: 0.99 X10(3) UL (ref 1.5–7.7)
NEUTROPHILS NFR BLD AUTO: 30.9 %
NITRITE UR QL STRIP.AUTO: NEGATIVE
OSMOLALITY SERPL CALC.SUM OF ELEC: 294 MOSM/KG (ref 275–295)
PATIENT FASTING: NO
PH UR: 6 [PH] (ref 5–8)
PLATELET # BLD AUTO: 172 10(3)UL (ref 150–450)
POTASSIUM SERPL-SCNC: 3.3 MMOL/L (ref 3.5–5.1)
PROT UR-MCNC: NEGATIVE MG/DL
RBC # BLD AUTO: 4.31 X10(6)UL (ref 4.3–5.7)
RBC #/AREA URNS AUTO: 2 /HPF
SODIUM SERPL-SCNC: 141 MMOL/L (ref 136–145)
SP GR UR STRIP: 1.01 (ref 1–1.03)
UROBILINOGEN UR STRIP-ACNC: <2
VIT C UR-MCNC: NEGATIVE MG/DL
WBC # BLD AUTO: 3.2 X10(3) UL (ref 4–11)
WBC #/AREA URNS AUTO: 0 /HPF

## 2019-09-03 PROCEDURE — 99215 OFFICE O/P EST HI 40 MIN: CPT | Performed by: INTERNAL MEDICINE

## 2019-09-03 PROCEDURE — 36591 DRAW BLOOD OFF VENOUS DEVICE: CPT

## 2019-09-03 PROCEDURE — 85060 BLOOD SMEAR INTERPRETATION: CPT

## 2019-09-03 PROCEDURE — 85025 COMPLETE CBC W/AUTO DIFF WBC: CPT

## 2019-09-03 PROCEDURE — 81001 URINALYSIS AUTO W/SCOPE: CPT

## 2019-09-03 PROCEDURE — 80053 COMPREHEN METABOLIC PANEL: CPT

## 2019-09-03 RX ORDER — HEPARIN SODIUM (PORCINE) LOCK FLUSH IV SOLN 100 UNIT/ML 100 UNIT/ML
5 SOLUTION INTRAVENOUS ONCE
Status: CANCELLED | OUTPATIENT
Start: 2019-09-03

## 2019-09-03 RX ORDER — HEPARIN SODIUM (PORCINE) LOCK FLUSH IV SOLN 100 UNIT/ML 100 UNIT/ML
5 SOLUTION INTRAVENOUS ONCE
Status: COMPLETED | OUTPATIENT
Start: 2019-09-03 | End: 2019-09-03

## 2019-09-03 RX ORDER — HEPARIN SODIUM (PORCINE) LOCK FLUSH IV SOLN 100 UNIT/ML 100 UNIT/ML
SOLUTION INTRAVENOUS
Status: COMPLETED
Start: 2019-09-03 | End: 2019-09-03

## 2019-09-03 RX ORDER — 0.9 % SODIUM CHLORIDE 0.9 %
VIAL (ML) INJECTION
Status: DISCONTINUED
Start: 2019-09-03 | End: 2019-09-03

## 2019-09-03 RX ORDER — 0.9 % SODIUM CHLORIDE 0.9 %
10 VIAL (ML) INJECTION ONCE
Status: CANCELLED | OUTPATIENT
Start: 2019-09-03

## 2019-09-03 RX ORDER — FLUOROURACIL 50 MG/ML
2400 INJECTION, SOLUTION INTRAVENOUS CONTINUOUS
Status: CANCELLED | OUTPATIENT
Start: 2019-09-17

## 2019-09-03 RX ADMIN — HEPARIN SODIUM (PORCINE) LOCK FLUSH IV SOLN 100 UNIT/ML 500 UNITS: 100 SOLUTION INTRAVENOUS at 14:24:00

## 2019-09-03 NOTE — PROGRESS NOTES
Celena Pelt to infusion today for port flush and de-access. Deferred one week for C7 FOLFOX/Avastin d/t neutropenia, ANC 0.99. He arrives alert and independent, states he has been feeling well. Port flushed per protocol with NS and 500 units Heparin.  Port de-c

## 2019-09-03 NOTE — PROGRESS NOTES
Cancer Center Progress Note    Patient Name: Nader Dias   YOB: 1965   Medical Record Number: [id number]   CSN: [id number]   Consulting Physician: Melly Alex MD  Referring Physician(s): Bernard Britton  Date of Visit: 9/03/2019     Chief current colonoscopy findings. Patient continues to feel well without any symptoms of disease or systemic signs of illness.   Denies any bleeding or bruising symptoms, no chest pain, no dyspnea, no nausea, vomiting abdominal pain, no change in activity lon file      Highest education level: Not on file    Occupational History      Occupation: shipping and rec.      Social Needs      Financial resource strain: Not on file      Food insecurity:        Worry: Not on file        Inability: Not on file      Transp He lives his [facility] in [place], [place]. He formerly was a , enjoys time with his family, and watching basketball.        Allergies:     Dust Mite Extract       UNKNOWN    Current Medications:    AMLODIPINE BESYLATE 5 MG Oral Tab TAKE 1 TABLET B positives and negatives noted in the the HPI.      Vital Signs:  BP (!) 152/93 (BP Location: Left arm, Patient Position: Sitting, Cuff Size: large)   Pulse 74   Temp 98.3 °F (36.8 °C) (Oral)   Resp 16   Ht 1.88 m (6' 2\")   Wt 123.4 kg (272 lb)   SpO2 98% Imaging:    N/A        Impression:    No diagnosis found.   47year old M with no prior contributing past medical history and maternal aunt affected by pancreatic cancer presents for evaluation of adenocarcinoma identified in the descending colon and per his report; only occasional symptoms      3.) GERD symptoms/voice changes  --improved with omeprazole    4.) Anxiety about health    ---pt has occasional tearing and crying related to his new health situation which is an expected condition discussed as

## 2019-09-05 ENCOUNTER — APPOINTMENT (OUTPATIENT)
Dept: HEMATOLOGY/ONCOLOGY | Facility: HOSPITAL | Age: 54
End: 2019-09-05
Attending: INTERNAL MEDICINE
Payer: COMMERCIAL

## 2019-09-09 ENCOUNTER — NURSE ONLY (OUTPATIENT)
Dept: HEMATOLOGY/ONCOLOGY | Facility: HOSPITAL | Age: 54
End: 2019-09-09
Attending: INTERNAL MEDICINE
Payer: COMMERCIAL

## 2019-09-09 DIAGNOSIS — C18.6 MALIGNANT NEOPLASM OF DESCENDING COLON (HCC): Primary | ICD-10-CM

## 2019-09-09 LAB
ALBUMIN SERPL-MCNC: 3.4 G/DL (ref 3.4–5)
ALBUMIN/GLOB SERPL: 0.8 {RATIO} (ref 1–2)
ALP LIVER SERPL-CCNC: 103 U/L (ref 45–117)
ALT SERPL-CCNC: 22 U/L (ref 16–61)
ANION GAP SERPL CALC-SCNC: 7 MMOL/L (ref 0–18)
AST SERPL-CCNC: 12 U/L (ref 15–37)
BASOPHILS # BLD AUTO: 0.03 X10(3) UL (ref 0–0.2)
BASOPHILS NFR BLD AUTO: 0.8 %
BILIRUB SERPL-MCNC: 0.2 MG/DL (ref 0.1–2)
BILIRUB UR QL: NEGATIVE
BUN BLD-MCNC: 13 MG/DL (ref 7–18)
BUN/CREAT SERPL: 9.8 (ref 10–20)
CALCIUM BLD-MCNC: 8.8 MG/DL (ref 8.5–10.1)
CHLORIDE SERPL-SCNC: 107 MMOL/L (ref 98–112)
CLARITY UR: CLEAR
CO2 SERPL-SCNC: 27 MMOL/L (ref 21–32)
COLOR UR: YELLOW
CREAT BLD-MCNC: 1.32 MG/DL (ref 0.7–1.3)
DEPRECATED RDW RBC AUTO: 50.4 FL (ref 35.1–46.3)
EOSINOPHIL # BLD AUTO: 0.03 X10(3) UL (ref 0–0.7)
EOSINOPHIL NFR BLD AUTO: 0.8 %
ERYTHROCYTE [DISTWIDTH] IN BLOOD BY AUTOMATED COUNT: 15.3 % (ref 11–15)
GLOBULIN PLAS-MCNC: 4.1 G/DL (ref 2.8–4.4)
GLUCOSE BLD-MCNC: 83 MG/DL (ref 70–99)
GLUCOSE UR-MCNC: NEGATIVE MG/DL
HCT VFR BLD AUTO: 39.5 % (ref 39–53)
HGB BLD-MCNC: 12.4 G/DL (ref 13–17.5)
HGB UR QL STRIP.AUTO: NEGATIVE
IMM GRANULOCYTES # BLD AUTO: 0.01 X10(3) UL (ref 0–1)
IMM GRANULOCYTES NFR BLD: 0.3 %
KETONES UR-MCNC: NEGATIVE MG/DL
LEUKOCYTE ESTERASE UR QL STRIP.AUTO: NEGATIVE
LYMPHOCYTES # BLD AUTO: 2.1 X10(3) UL (ref 1–4)
LYMPHOCYTES NFR BLD AUTO: 53.8 %
M PROTEIN MFR SERPL ELPH: 7.5 G/DL (ref 6.4–8.2)
MCH RBC QN AUTO: 28.2 PG (ref 26–34)
MCHC RBC AUTO-ENTMCNC: 31.4 G/DL (ref 31–37)
MCV RBC AUTO: 89.8 FL (ref 80–100)
MONOCYTES # BLD AUTO: 0.65 X10(3) UL (ref 0.1–1)
MONOCYTES NFR BLD AUTO: 16.7 %
NEUTROPHILS # BLD AUTO: 1.08 X10 (3) UL (ref 1.5–7.7)
NEUTROPHILS # BLD AUTO: 1.08 X10(3) UL (ref 1.5–7.7)
NEUTROPHILS NFR BLD AUTO: 27.6 %
NITRITE UR QL STRIP.AUTO: NEGATIVE
OSMOLALITY SERPL CALC.SUM OF ELEC: 291 MOSM/KG (ref 275–295)
PATIENT FASTING: NO
PH UR: 5 [PH] (ref 5–8)
PLATELET # BLD AUTO: 233 10(3)UL (ref 150–450)
POTASSIUM SERPL-SCNC: 3.6 MMOL/L (ref 3.5–5.1)
PROT UR-MCNC: NEGATIVE MG/DL
RBC # BLD AUTO: 4.4 X10(6)UL (ref 4.3–5.7)
SODIUM SERPL-SCNC: 141 MMOL/L (ref 136–145)
SP GR UR STRIP: 1.02 (ref 1–1.03)
UROBILINOGEN UR STRIP-ACNC: <2
VIT C UR-MCNC: NEGATIVE MG/DL
WBC # BLD AUTO: 3.9 X10(3) UL (ref 4–11)

## 2019-09-09 PROCEDURE — 81003 URINALYSIS AUTO W/O SCOPE: CPT

## 2019-09-09 PROCEDURE — 80053 COMPREHEN METABOLIC PANEL: CPT

## 2019-09-09 PROCEDURE — 36415 COLL VENOUS BLD VENIPUNCTURE: CPT

## 2019-09-09 PROCEDURE — 85025 COMPLETE CBC W/AUTO DIFF WBC: CPT

## 2019-09-10 ENCOUNTER — APPOINTMENT (OUTPATIENT)
Dept: HEMATOLOGY/ONCOLOGY | Facility: HOSPITAL | Age: 54
End: 2019-09-10
Attending: INTERNAL MEDICINE
Payer: COMMERCIAL

## 2019-09-12 ENCOUNTER — OFFICE VISIT (OUTPATIENT)
Dept: INTEGRATIVE MEDICINE | Facility: CLINIC | Age: 54
End: 2019-09-12

## 2019-09-12 ENCOUNTER — APPOINTMENT (OUTPATIENT)
Dept: HEMATOLOGY/ONCOLOGY | Facility: HOSPITAL | Age: 54
End: 2019-09-12
Attending: INTERNAL MEDICINE
Payer: COMMERCIAL

## 2019-09-12 DIAGNOSIS — C18.9 COLON CANCER METASTASIZED TO LIVER (HCC): ICD-10-CM

## 2019-09-12 DIAGNOSIS — C78.7 COLON CANCER METASTASIZED TO LIVER (HCC): ICD-10-CM

## 2019-09-12 NOTE — PROGRESS NOTES
[address]                INITIAL CONSULTATION  Patient is going through chemotherapy for colon cancer and comes for some low back pain. When taking the chemo is feeling nausea and digestive troubles.   His voice is very wea

## 2019-09-16 ENCOUNTER — NURSE ONLY (OUTPATIENT)
Dept: HEMATOLOGY/ONCOLOGY | Facility: HOSPITAL | Age: 54
End: 2019-09-16
Attending: INTERNAL MEDICINE
Payer: COMMERCIAL

## 2019-09-16 DIAGNOSIS — C18.6 MALIGNANT NEOPLASM OF DESCENDING COLON (HCC): Primary | ICD-10-CM

## 2019-09-16 LAB
ALBUMIN SERPL-MCNC: 3.4 G/DL (ref 3.4–5)
ALBUMIN/GLOB SERPL: 0.8 {RATIO} (ref 1–2)
ALP LIVER SERPL-CCNC: 113 U/L (ref 45–117)
ALT SERPL-CCNC: 28 U/L (ref 16–61)
ANION GAP SERPL CALC-SCNC: 7 MMOL/L (ref 0–18)
AST SERPL-CCNC: 23 U/L (ref 15–37)
BASOPHILS # BLD AUTO: 0.03 X10(3) UL (ref 0–0.2)
BASOPHILS NFR BLD AUTO: 0.6 %
BILIRUB SERPL-MCNC: 0.2 MG/DL (ref 0.1–2)
BILIRUB UR QL: NEGATIVE
BUN BLD-MCNC: 19 MG/DL (ref 7–18)
BUN/CREAT SERPL: 16.8 (ref 10–20)
CALCIUM BLD-MCNC: 8.4 MG/DL (ref 8.5–10.1)
CHLORIDE SERPL-SCNC: 107 MMOL/L (ref 98–112)
CLARITY UR: CLEAR
CO2 SERPL-SCNC: 27 MMOL/L (ref 21–32)
COLOR UR: YELLOW
CREAT BLD-MCNC: 1.13 MG/DL (ref 0.7–1.3)
DEPRECATED RDW RBC AUTO: 50.4 FL (ref 35.1–46.3)
EOSINOPHIL # BLD AUTO: 0.02 X10(3) UL (ref 0–0.7)
EOSINOPHIL NFR BLD AUTO: 0.4 %
ERYTHROCYTE [DISTWIDTH] IN BLOOD BY AUTOMATED COUNT: 15.3 % (ref 11–15)
GLOBULIN PLAS-MCNC: 4.3 G/DL (ref 2.8–4.4)
GLUCOSE BLD-MCNC: 120 MG/DL (ref 70–99)
GLUCOSE UR-MCNC: NEGATIVE MG/DL
HCT VFR BLD AUTO: 41.4 % (ref 39–53)
HGB BLD-MCNC: 13 G/DL (ref 13–17.5)
HGB UR QL STRIP.AUTO: NEGATIVE
IMM GRANULOCYTES # BLD AUTO: 0.02 X10(3) UL (ref 0–1)
IMM GRANULOCYTES NFR BLD: 0.4 %
KETONES UR-MCNC: NEGATIVE MG/DL
LEUKOCYTE ESTERASE UR QL STRIP.AUTO: NEGATIVE
LYMPHOCYTES # BLD AUTO: 2.11 X10(3) UL (ref 1–4)
LYMPHOCYTES NFR BLD AUTO: 45.6 %
M PROTEIN MFR SERPL ELPH: 7.7 G/DL (ref 6.4–8.2)
MCH RBC QN AUTO: 28.4 PG (ref 26–34)
MCHC RBC AUTO-ENTMCNC: 31.4 G/DL (ref 31–37)
MCV RBC AUTO: 90.6 FL (ref 80–100)
MONOCYTES # BLD AUTO: 0.37 X10(3) UL (ref 0.1–1)
MONOCYTES NFR BLD AUTO: 8 %
NEUTROPHILS # BLD AUTO: 2.08 X10 (3) UL (ref 1.5–7.7)
NEUTROPHILS # BLD AUTO: 2.08 X10(3) UL (ref 1.5–7.7)
NEUTROPHILS NFR BLD AUTO: 45 %
NITRITE UR QL STRIP.AUTO: NEGATIVE
OSMOLALITY SERPL CALC.SUM OF ELEC: 295 MOSM/KG (ref 275–295)
PATIENT FASTING: NO
PH UR: 5 [PH] (ref 5–8)
PLATELET # BLD AUTO: 185 10(3)UL (ref 150–450)
POTASSIUM SERPL-SCNC: 3.6 MMOL/L (ref 3.5–5.1)
PROT UR-MCNC: NEGATIVE MG/DL
RBC # BLD AUTO: 4.57 X10(6)UL (ref 4.3–5.7)
SODIUM SERPL-SCNC: 141 MMOL/L (ref 136–145)
SP GR UR STRIP: 1.02 (ref 1–1.03)
UROBILINOGEN UR STRIP-ACNC: <2
VIT C UR-MCNC: NEGATIVE MG/DL
WBC # BLD AUTO: 4.6 X10(3) UL (ref 4–11)

## 2019-09-16 PROCEDURE — 80053 COMPREHEN METABOLIC PANEL: CPT

## 2019-09-16 PROCEDURE — 85025 COMPLETE CBC W/AUTO DIFF WBC: CPT

## 2019-09-16 PROCEDURE — 36415 COLL VENOUS BLD VENIPUNCTURE: CPT

## 2019-09-16 PROCEDURE — 81003 URINALYSIS AUTO W/O SCOPE: CPT

## 2019-09-16 RX ORDER — HEPARIN SODIUM (PORCINE) LOCK FLUSH IV SOLN 100 UNIT/ML 100 UNIT/ML
5 SOLUTION INTRAVENOUS ONCE
Status: CANCELLED | OUTPATIENT
Start: 2019-09-16

## 2019-09-16 RX ORDER — 0.9 % SODIUM CHLORIDE 0.9 %
10 VIAL (ML) INJECTION ONCE
Status: CANCELLED | OUTPATIENT
Start: 2019-09-16

## 2019-09-17 ENCOUNTER — APPOINTMENT (OUTPATIENT)
Dept: HEMATOLOGY/ONCOLOGY | Facility: HOSPITAL | Age: 54
End: 2019-09-17
Attending: INTERNAL MEDICINE
Payer: COMMERCIAL

## 2019-09-17 VITALS
DIASTOLIC BLOOD PRESSURE: 86 MMHG | WEIGHT: 275 LBS | OXYGEN SATURATION: 97 % | BODY MASS INDEX: 35 KG/M2 | TEMPERATURE: 99 F | SYSTOLIC BLOOD PRESSURE: 143 MMHG | HEART RATE: 70 BPM | RESPIRATION RATE: 16 BRPM

## 2019-09-17 DIAGNOSIS — C18.6 MALIGNANT NEOPLASM OF DESCENDING COLON (HCC): Primary | ICD-10-CM

## 2019-09-17 PROCEDURE — 96368 THER/DIAG CONCURRENT INF: CPT

## 2019-09-17 PROCEDURE — 96375 TX/PRO/DX INJ NEW DRUG ADDON: CPT

## 2019-09-17 PROCEDURE — 96413 CHEMO IV INFUSION 1 HR: CPT

## 2019-09-17 PROCEDURE — 96417 CHEMO IV INFUS EACH ADDL SEQ: CPT

## 2019-09-17 PROCEDURE — 96415 CHEMO IV INFUSION ADDL HR: CPT

## 2019-09-17 RX ORDER — FLUOROURACIL 50 MG/ML
2400 INJECTION, SOLUTION INTRAVENOUS CONTINUOUS
Status: DISCONTINUED | OUTPATIENT
Start: 2019-09-17 | End: 2019-09-17

## 2019-09-17 RX ORDER — 0.9 % SODIUM CHLORIDE 0.9 %
VIAL (ML) INJECTION
Status: DISCONTINUED
Start: 2019-09-17 | End: 2019-09-17

## 2019-09-17 RX ADMIN — FLUOROURACIL 5950 MG: 50 INJECTION, SOLUTION INTRAVENOUS at 12:15:00

## 2019-09-19 ENCOUNTER — NURSE ONLY (OUTPATIENT)
Dept: HEMATOLOGY/ONCOLOGY | Facility: HOSPITAL | Age: 54
End: 2019-09-19
Attending: INTERNAL MEDICINE
Payer: COMMERCIAL

## 2019-09-19 VITALS — TEMPERATURE: 98 F

## 2019-09-19 DIAGNOSIS — C18.6 MALIGNANT NEOPLASM OF DESCENDING COLON (HCC): Primary | ICD-10-CM

## 2019-09-19 PROCEDURE — 96372 THER/PROPH/DIAG INJ SC/IM: CPT

## 2019-09-19 RX ORDER — 0.9 % SODIUM CHLORIDE 0.9 %
10 VIAL (ML) INJECTION ONCE
Status: CANCELLED | OUTPATIENT
Start: 2019-09-19

## 2019-09-19 RX ORDER — HEPARIN SODIUM (PORCINE) LOCK FLUSH IV SOLN 100 UNIT/ML 100 UNIT/ML
5 SOLUTION INTRAVENOUS ONCE
Status: COMPLETED | OUTPATIENT
Start: 2019-09-19 | End: 2019-09-19

## 2019-09-19 RX ORDER — HEPARIN SODIUM (PORCINE) LOCK FLUSH IV SOLN 100 UNIT/ML 100 UNIT/ML
5 SOLUTION INTRAVENOUS ONCE
Status: CANCELLED | OUTPATIENT
Start: 2019-09-19

## 2019-09-19 RX ADMIN — HEPARIN SODIUM (PORCINE) LOCK FLUSH IV SOLN 100 UNIT/ML 500 UNITS: 100 SOLUTION INTRAVENOUS at 10:23:00

## 2019-09-23 ENCOUNTER — TELEPHONE (OUTPATIENT)
Dept: HEMATOLOGY/ONCOLOGY | Facility: HOSPITAL | Age: 54
End: 2019-09-23

## 2019-09-23 NOTE — TELEPHONE ENCOUNTER
Received a message that pt arrived at  stating he was told if he ever had any questions or problems to please stop by. This RN was not available so pt left a message. Reports having tingling after treatment, had FOLFOX/DANE on 9/17/19.   Please c

## 2019-09-23 NOTE — TELEPHONE ENCOUNTER
Toxicities: C7D1 Fluorouracil/Leucovorian Calcium/Oxaliplatin/Bevacizumab on 9/17/2019  Pegfilgrastim-cbqv on 9/19/2019      Peripheral Sensory Neuropathy      Peripheral Sensory Neuropathy: Grade 2 (Pt reports the tingling in all 10 toes & numbness on the

## 2019-09-24 ENCOUNTER — APPOINTMENT (OUTPATIENT)
Dept: INTEGRATIVE MEDICINE | Facility: HOSPITAL | Age: 54
End: 2019-09-24
Attending: FAMILY MEDICINE

## 2019-09-30 ENCOUNTER — TELEPHONE (OUTPATIENT)
Dept: HEMATOLOGY/ONCOLOGY | Facility: HOSPITAL | Age: 54
End: 2019-09-30

## 2019-09-30 ENCOUNTER — NURSE ONLY (OUTPATIENT)
Dept: HEMATOLOGY/ONCOLOGY | Facility: HOSPITAL | Age: 54
End: 2019-09-30
Attending: INTERNAL MEDICINE
Payer: COMMERCIAL

## 2019-09-30 VITALS
WEIGHT: 280 LBS | OXYGEN SATURATION: 100 % | HEIGHT: 74 IN | BODY MASS INDEX: 35.94 KG/M2 | HEART RATE: 71 BPM | RESPIRATION RATE: 16 BRPM | DIASTOLIC BLOOD PRESSURE: 87 MMHG | TEMPERATURE: 98 F | SYSTOLIC BLOOD PRESSURE: 153 MMHG

## 2019-09-30 DIAGNOSIS — C18.6 MALIGNANT NEOPLASM OF DESCENDING COLON (HCC): Primary | ICD-10-CM

## 2019-09-30 DIAGNOSIS — T45.1X5A CHEMOTHERAPY-INDUCED NEUROPATHY (HCC): ICD-10-CM

## 2019-09-30 DIAGNOSIS — C78.7 COLON CANCER METASTASIZED TO LIVER (HCC): ICD-10-CM

## 2019-09-30 DIAGNOSIS — G62.0 CHEMOTHERAPY-INDUCED NEUROPATHY (HCC): ICD-10-CM

## 2019-09-30 DIAGNOSIS — Z51.11 ENCOUNTER FOR CHEMOTHERAPY MANAGEMENT: ICD-10-CM

## 2019-09-30 DIAGNOSIS — C18.9 COLON CANCER METASTASIZED TO LIVER (HCC): ICD-10-CM

## 2019-09-30 LAB
ALBUMIN SERPL-MCNC: 3.5 G/DL (ref 3.4–5)
ALBUMIN/GLOB SERPL: 0.9 {RATIO} (ref 1–2)
ALP LIVER SERPL-CCNC: 111 U/L (ref 45–117)
ALT SERPL-CCNC: 27 U/L (ref 16–61)
ANION GAP SERPL CALC-SCNC: 6 MMOL/L (ref 0–18)
AST SERPL-CCNC: 17 U/L (ref 15–37)
BASOPHILS # BLD AUTO: 0.03 X10(3) UL (ref 0–0.2)
BASOPHILS NFR BLD AUTO: 0.5 %
BILIRUB SERPL-MCNC: 0.2 MG/DL (ref 0.1–2)
BUN BLD-MCNC: 12 MG/DL (ref 7–18)
BUN/CREAT SERPL: 10.3 (ref 10–20)
CALCIUM BLD-MCNC: 8.5 MG/DL (ref 8.5–10.1)
CHLORIDE SERPL-SCNC: 107 MMOL/L (ref 98–112)
CO2 SERPL-SCNC: 26 MMOL/L (ref 21–32)
CREAT BLD-MCNC: 1.17 MG/DL (ref 0.7–1.3)
DEPRECATED RDW RBC AUTO: 48.7 FL (ref 35.1–46.3)
EOSINOPHIL # BLD AUTO: 0.03 X10(3) UL (ref 0–0.7)
EOSINOPHIL NFR BLD AUTO: 0.5 %
ERYTHROCYTE [DISTWIDTH] IN BLOOD BY AUTOMATED COUNT: 15 % (ref 11–15)
GLOBULIN PLAS-MCNC: 4 G/DL (ref 2.8–4.4)
GLUCOSE BLD-MCNC: 100 MG/DL (ref 70–99)
HCT VFR BLD AUTO: 41.3 % (ref 39–53)
HGB BLD-MCNC: 12.9 G/DL (ref 13–17.5)
IMM GRANULOCYTES # BLD AUTO: 0.06 X10(3) UL (ref 0–1)
IMM GRANULOCYTES NFR BLD: 1 %
LYMPHOCYTES # BLD AUTO: 2.25 X10(3) UL (ref 1–4)
LYMPHOCYTES NFR BLD AUTO: 36.2 %
M PROTEIN MFR SERPL ELPH: 7.5 G/DL (ref 6.4–8.2)
MCH RBC QN AUTO: 28 PG (ref 26–34)
MCHC RBC AUTO-ENTMCNC: 31.2 G/DL (ref 31–37)
MCV RBC AUTO: 89.8 FL (ref 80–100)
MONOCYTES # BLD AUTO: 0.44 X10(3) UL (ref 0.1–1)
MONOCYTES NFR BLD AUTO: 7.1 %
NEUTROPHILS # BLD AUTO: 3.41 X10 (3) UL (ref 1.5–7.7)
NEUTROPHILS # BLD AUTO: 3.41 X10(3) UL (ref 1.5–7.7)
NEUTROPHILS NFR BLD AUTO: 54.7 %
OSMOLALITY SERPL CALC.SUM OF ELEC: 288 MOSM/KG (ref 275–295)
PATIENT FASTING: NO
PLATELET # BLD AUTO: 172 10(3)UL (ref 150–450)
POTASSIUM SERPL-SCNC: 3.4 MMOL/L (ref 3.5–5.1)
RBC # BLD AUTO: 4.6 X10(6)UL (ref 4.3–5.7)
SODIUM SERPL-SCNC: 139 MMOL/L (ref 136–145)
WBC # BLD AUTO: 6.2 X10(3) UL (ref 4–11)

## 2019-09-30 PROCEDURE — 36415 COLL VENOUS BLD VENIPUNCTURE: CPT

## 2019-09-30 PROCEDURE — 99215 OFFICE O/P EST HI 40 MIN: CPT | Performed by: INTERNAL MEDICINE

## 2019-09-30 PROCEDURE — 85025 COMPLETE CBC W/AUTO DIFF WBC: CPT

## 2019-09-30 PROCEDURE — 80053 COMPREHEN METABOLIC PANEL: CPT

## 2019-09-30 RX ORDER — FLUOROURACIL 50 MG/ML
2400 INJECTION, SOLUTION INTRAVENOUS CONTINUOUS
Status: CANCELLED | OUTPATIENT
Start: 2019-10-01

## 2019-09-30 RX ORDER — AMLODIPINE BESYLATE 5 MG/1
5 TABLET ORAL
Qty: 90 TABLET | Refills: 1 | Status: CANCELLED | OUTPATIENT
Start: 2019-09-30

## 2019-09-30 NOTE — PROGRESS NOTES
Cancer Center Progress Note    Patient Name: Cony Al   YOB: 1965   Medical Record Number: [id number]   CSN: [id number]   Consulting Physician: Roverto Reilly MD  Referring Physician(s): Larisa Corbin  Date of Visit: 9/30/2019     Chief recommended to correlate with current colonoscopy findings. Patient continues to feel well without any symptoms of disease or systemic signs of illness.   Denies any bleeding or bruising symptoms, no chest pain, no dyspnea, no nausea, vomiting abdominal pa Years of education: Not on file      Highest education level: Not on file    Occupational History      Occupation: shipping and rec.      Social Needs      Financial resource strain: Not on file      Food insecurity:        Worry: Not on file        [facility]OO [facility]. He lives his [place] in [place], [place]. He formerly was a , enjoys time with his family, and watching basketball.        Allergies:     Dust Mite Extract       UNKNOWN    Current Medications:    AMLODIPINE BESYLATE 5 Pertinent positives and negatives noted in the the HPI.      Vital Signs:  /87 (BP Location: Left arm, Patient Position: Sitting, Cuff Size: large)   Pulse 71   Temp 98.4 °F (36.9 °C) (Oral)   Resp 16   Ht 1.88 m (6' 2\")   Wt 127 kg (280 lb)   SpO2 1 diagnosis found.   47year old M with no prior contributing past medical history and maternal aunt affected by pancreatic cancer presents for evaluation of adenocarcinoma identified in the descending colon and the presence of 2 suspicious lesions in the adrien pain    ---noted and unchanged per his report; only occasional symptoms      3.) GERD symptoms/voice changes  --improved with omeprazole    4.) Anxiety about health    ---pt has occasional tearing and crying related to his new health situation which is an

## 2019-09-30 NOTE — TELEPHONE ENCOUNTER
Malia Domingo called with concerns that no one got back to him after his call 9/23/19 regarding his sick call with complaints of Peripheral neuropathy post chemo.   Currently he states the numbness is to his toes of both feet and numbness to bottom of feet, uncha

## 2019-10-01 ENCOUNTER — TELEPHONE (OUTPATIENT)
Dept: OTOLARYNGOLOGY | Facility: CLINIC | Age: 54
End: 2019-10-01

## 2019-10-01 ENCOUNTER — OFFICE VISIT (OUTPATIENT)
Dept: HEMATOLOGY/ONCOLOGY | Facility: HOSPITAL | Age: 54
End: 2019-10-01
Attending: INTERNAL MEDICINE
Payer: COMMERCIAL

## 2019-10-01 VITALS
HEART RATE: 68 BPM | TEMPERATURE: 98 F | SYSTOLIC BLOOD PRESSURE: 150 MMHG | OXYGEN SATURATION: 99 % | RESPIRATION RATE: 16 BRPM | DIASTOLIC BLOOD PRESSURE: 81 MMHG

## 2019-10-01 DIAGNOSIS — C18.6 MALIGNANT NEOPLASM OF DESCENDING COLON (HCC): Primary | ICD-10-CM

## 2019-10-01 PROCEDURE — 96413 CHEMO IV INFUSION 1 HR: CPT

## 2019-10-01 PROCEDURE — 96367 TX/PROPH/DG ADDL SEQ IV INF: CPT

## 2019-10-01 RX ORDER — LIDOCAINE AND PRILOCAINE 25; 25 MG/G; MG/G
CREAM TOPICAL
Qty: 1 TUBE | Refills: 1 | Status: ON HOLD | OUTPATIENT
Start: 2019-10-01 | End: 2020-01-09

## 2019-10-01 RX ORDER — FLUOROURACIL 50 MG/ML
2400 INJECTION, SOLUTION INTRAVENOUS CONTINUOUS
Status: DISCONTINUED | OUTPATIENT
Start: 2019-10-01 | End: 2019-10-01

## 2019-10-01 RX ADMIN — FLUOROURACIL 6000 MG: 50 INJECTION, SOLUTION INTRAVENOUS at 11:52:00

## 2019-10-01 NOTE — TELEPHONE ENCOUNTER
The patient did not read his Spot[employer] message. He stated he uses the [facility] and not [employer]. The patient has a refill at [facility].

## 2019-10-01 NOTE — PROGRESS NOTES
Pt here for C8D1 FOLFOX, Avastin. Arrives Ambulating independently, accompanied by Self           Modifications in dose or schedule: Yes  Oxaliplatin removed for this cycle d/t PN    Patient reports he is feeling ok.   He does have fatigue and occasional n

## 2019-10-01 NOTE — TELEPHONE ENCOUNTER
Current Outpatient Medications:   ••  Omeprazole 40 MG Oral Capsule Delayed Release, Take 1 capsule (40 mg total) by mouth daily.  Before a meal, Disp: 30 capsule, Rfl: 11  •

## 2019-10-02 RX ORDER — OMEPRAZOLE 40 MG/1
40 CAPSULE, DELAYED RELEASE ORAL DAILY
Qty: 30 CAPSULE | Refills: 9 | Status: SHIPPED | OUTPATIENT
Start: 2019-10-02 | End: 2019-11-22

## 2019-10-03 ENCOUNTER — NURSE ONLY (OUTPATIENT)
Dept: HEMATOLOGY/ONCOLOGY | Facility: HOSPITAL | Age: 54
End: 2019-10-03
Attending: INTERNAL MEDICINE
Payer: COMMERCIAL

## 2019-10-03 VITALS
TEMPERATURE: 98 F | DIASTOLIC BLOOD PRESSURE: 91 MMHG | HEART RATE: 57 BPM | RESPIRATION RATE: 16 BRPM | SYSTOLIC BLOOD PRESSURE: 151 MMHG

## 2019-10-03 DIAGNOSIS — C18.6 MALIGNANT NEOPLASM OF DESCENDING COLON (HCC): Primary | ICD-10-CM

## 2019-10-03 PROCEDURE — 96372 THER/PROPH/DIAG INJ SC/IM: CPT

## 2019-10-03 RX ORDER — 0.9 % SODIUM CHLORIDE 0.9 %
10 VIAL (ML) INJECTION ONCE
Status: CANCELLED | OUTPATIENT
Start: 2019-10-03

## 2019-10-03 RX ORDER — HEPARIN SODIUM (PORCINE) LOCK FLUSH IV SOLN 100 UNIT/ML 100 UNIT/ML
5 SOLUTION INTRAVENOUS ONCE
Status: COMPLETED | OUTPATIENT
Start: 2019-10-03 | End: 2019-10-03

## 2019-10-03 RX ORDER — AMLODIPINE BESYLATE 5 MG/1
5 TABLET ORAL
Qty: 90 TABLET | Refills: 1 | Status: SHIPPED | OUTPATIENT
Start: 2019-10-03 | End: 2020-01-01

## 2019-10-03 RX ORDER — HEPARIN SODIUM (PORCINE) LOCK FLUSH IV SOLN 100 UNIT/ML 100 UNIT/ML
5 SOLUTION INTRAVENOUS ONCE
Status: CANCELLED | OUTPATIENT
Start: 2019-10-03

## 2019-10-03 RX ADMIN — HEPARIN SODIUM (PORCINE) LOCK FLUSH IV SOLN 100 UNIT/ML 500 UNITS: 100 SOLUTION INTRAVENOUS at 10:36:00

## 2019-10-04 NOTE — TELEPHONE ENCOUNTER
Refill passed per [facility] protocol. Requested Prescriptions   Pending Prescriptions Disp Refills   • amLODIPine Besylate 5 MG Oral Tab 90 tablet 1     Sig: Take 1 tablet (5 mg total) by mouth once daily.        Hypertensive Medications Protocol P

## 2019-10-09 ENCOUNTER — NURSE ONLY (OUTPATIENT)
Dept: INTEGRATIVE MEDICINE | Facility: CLINIC | Age: 54
End: 2019-10-09

## 2019-10-10 ENCOUNTER — OFFICE VISIT (OUTPATIENT)
Dept: INTEGRATIVE MEDICINE | Facility: CLINIC | Age: 54
End: 2019-10-10

## 2019-10-10 DIAGNOSIS — M54.50 CHRONIC MIDLINE LOW BACK PAIN WITHOUT SCIATICA: ICD-10-CM

## 2019-10-10 DIAGNOSIS — G89.29 CHRONIC MIDLINE LOW BACK PAIN WITHOUT SCIATICA: ICD-10-CM

## 2019-10-10 NOTE — PROGRESS NOTES
Chai Bailey  Integrative Medicine          Patient has felt 20% relief. INITIAL CONSULTATION  Patient is going through chemotherapy for colon cancer and comes for some low back pain.   When taking the chemo is feeling nausea and digestive trou

## 2019-10-10 NOTE — PROGRESS NOTES
Reiki Follow Up Note    Patient Name: Tiffanie Keane   YOB: 1965   Medical Record Number: [id number]   CSN: [id number]     Date of Visit: 10/9/2019    Reason for scheduling reiki session: No chief complaint on file.     Name of referring physic Exam           ASSESSMENT/PLAN:   No diagnosis found. No orders of the defined types were placed in this encounter.       Meds This Visit:  Requested Prescriptions      No prescriptions requested or ordered in this encounter       Imaging & Referrals:  N

## 2019-10-10 NOTE — PROGRESS NOTES
Reiki Intake and Documentation    Patient Name: Nader Dias   YOB: 1965   Medical Record Number: [id number]   CSN: [id number]     Date of Visit: 10/9/2019    Reason for scheduling reiki session: No chief complaint on file.     Name of referr neuropathy. Most discomfort when walking. Post-Reiki Therapy (Anxiety): 0    Electronically Signed by:    Dylan Barajas RN, 10/9/2019     Epic Template #[id number]:    Patient ID: Laxmi Monreal is a 47year old male.     HPI    Review of Systems         Curr

## 2019-10-14 ENCOUNTER — OFFICE VISIT (OUTPATIENT)
Dept: HEMATOLOGY/ONCOLOGY | Facility: HOSPITAL | Age: 54
End: 2019-10-14
Attending: INTERNAL MEDICINE
Payer: COMMERCIAL

## 2019-10-14 VITALS
SYSTOLIC BLOOD PRESSURE: 155 MMHG | RESPIRATION RATE: 16 BRPM | HEIGHT: 74 IN | DIASTOLIC BLOOD PRESSURE: 98 MMHG | TEMPERATURE: 98 F | WEIGHT: 279 LBS | BODY MASS INDEX: 35.81 KG/M2 | HEART RATE: 74 BPM | OXYGEN SATURATION: 99 %

## 2019-10-14 DIAGNOSIS — C78.7 COLON CANCER METASTASIZED TO LIVER (HCC): ICD-10-CM

## 2019-10-14 DIAGNOSIS — C18.9 COLON CANCER (HCC): ICD-10-CM

## 2019-10-14 DIAGNOSIS — C18.9 COLON CANCER METASTASIZED TO LIVER (HCC): ICD-10-CM

## 2019-10-14 DIAGNOSIS — T45.1X5A CHEMOTHERAPY-INDUCED NEUROPATHY (HCC): ICD-10-CM

## 2019-10-14 DIAGNOSIS — C18.6 MALIGNANT NEOPLASM OF DESCENDING COLON (HCC): Primary | ICD-10-CM

## 2019-10-14 DIAGNOSIS — G62.0 CHEMOTHERAPY-INDUCED NEUROPATHY (HCC): ICD-10-CM

## 2019-10-14 PROCEDURE — 80053 COMPREHEN METABOLIC PANEL: CPT

## 2019-10-14 PROCEDURE — 85025 COMPLETE CBC W/AUTO DIFF WBC: CPT

## 2019-10-14 PROCEDURE — 81001 URINALYSIS AUTO W/SCOPE: CPT

## 2019-10-14 PROCEDURE — 99215 OFFICE O/P EST HI 40 MIN: CPT | Performed by: INTERNAL MEDICINE

## 2019-10-14 PROCEDURE — 36415 COLL VENOUS BLD VENIPUNCTURE: CPT

## 2019-10-14 RX ORDER — FLUOROURACIL 50 MG/ML
2400 INJECTION, SOLUTION INTRAVENOUS CONTINUOUS
Status: CANCELLED | OUTPATIENT
Start: 2019-10-15

## 2019-10-14 RX ORDER — GABAPENTIN 300 MG/1
300 CAPSULE ORAL NIGHTLY
Qty: 90 CAPSULE | Refills: 2 | Status: SHIPPED | OUTPATIENT
Start: 2019-10-14 | End: 2019-11-04

## 2019-10-15 ENCOUNTER — OFFICE VISIT (OUTPATIENT)
Dept: HEMATOLOGY/ONCOLOGY | Facility: HOSPITAL | Age: 54
End: 2019-10-15
Attending: INTERNAL MEDICINE
Payer: COMMERCIAL

## 2019-10-15 VITALS
OXYGEN SATURATION: 94 % | TEMPERATURE: 98 F | RESPIRATION RATE: 16 BRPM | HEART RATE: 72 BPM | SYSTOLIC BLOOD PRESSURE: 143 MMHG | DIASTOLIC BLOOD PRESSURE: 80 MMHG

## 2019-10-15 DIAGNOSIS — C18.6 MALIGNANT NEOPLASM OF DESCENDING COLON (HCC): Primary | ICD-10-CM

## 2019-10-15 PROCEDURE — 96413 CHEMO IV INFUSION 1 HR: CPT

## 2019-10-15 PROCEDURE — 96368 THER/DIAG CONCURRENT INF: CPT

## 2019-10-15 PROCEDURE — 96367 TX/PROPH/DG ADDL SEQ IV INF: CPT

## 2019-10-15 PROCEDURE — 96375 TX/PRO/DX INJ NEW DRUG ADDON: CPT

## 2019-10-15 RX ORDER — FLUOROURACIL 50 MG/ML
2400 INJECTION, SOLUTION INTRAVENOUS CONTINUOUS
Status: DISCONTINUED | OUTPATIENT
Start: 2019-10-15 | End: 2019-10-15

## 2019-10-15 RX ORDER — 0.9 % SODIUM CHLORIDE 0.9 %
VIAL (ML) INJECTION
Status: DISCONTINUED
Start: 2019-10-15 | End: 2019-10-15

## 2019-10-15 RX ADMIN — FLUOROURACIL 6000 MG: 50 INJECTION, SOLUTION INTRAVENOUS at 10:54:00

## 2019-10-15 NOTE — PROGRESS NOTES
Pt here for C9D1 FOLFOX, Avastin. Arrives Ambulating independently, accompanied by Self           Modifications in dose or schedule: No. Oxaliplatin continues to be removed from the treatment plan for this cycle due to neuropathy.      Mary Jo Gustafson confirms his provided with a calender.

## 2019-10-15 NOTE — PROGRESS NOTES
Cancer Center Progress Note    Patient Name: Carissa Estrada   YOB: 1965   Medical Record Number: [id number]   CSN: [id number]   Consulting Physician: Omar Louie MD  Referring Physician(s): Chelsea Gill  Date of Visit: 10/14/2019     Chief recommended to correlate with current colonoscopy findings. Patient continues to feel well without any symptoms of disease or systemic signs of illness.   Denies any bleeding or bruising symptoms, no chest pain, no dyspnea, no nausea, vomiting abdominal pa 4      Years of education: Not on file      Highest education level: Not on file    Occupational History      Occupation: shipping and rec.      Social Needs      Financial resource strain: Not on file      Food insecurity:        Worry: Not on file receiving for [facility]. He lives his [place] in [place], [place]. He formerly was a , enjoys time with his family, and watching basketball.        Allergies:     Dust Mite Extract       UNKNOWN    Current Medications:  gabapenti pain.  Integument/breast: Negative for rash, skin lesions, and pruritus. Hematologic/lymphatic: Negative for easy bruising, bleeding, and lymphadenopathy. Musculoskeletal: Negative for myalgias, arthralgias, muscle weakness.   Neurological: Negative for h 10/14/2019 08:48 AM    CREATSERUM 1.36 (H) 10/14/2019 08:48 AM    GFRNAA 59 (L) 10/14/2019 08:48 AM    CA 8.9 10/14/2019 08:48 AM    ALB 3.6 10/14/2019 08:48 AM     10/14/2019 08:48 AM    K 4.0 10/14/2019 08:48 AM     10/14/2019 08:48 AM    CO2 reviewed reviewed his PET CT scan imaging in clinic today and discussed liver lesions look suspicious for metastatic disease.     --We will plan to treat with palliative intent chemotherapy using FOLFOX with bevacizumab for 6 months prior to surgical resect [facility]

## 2019-10-17 ENCOUNTER — NURSE ONLY (OUTPATIENT)
Dept: HEMATOLOGY/ONCOLOGY | Facility: HOSPITAL | Age: 54
End: 2019-10-17
Attending: INTERNAL MEDICINE
Payer: COMMERCIAL

## 2019-10-17 VITALS
HEART RATE: 64 BPM | DIASTOLIC BLOOD PRESSURE: 83 MMHG | SYSTOLIC BLOOD PRESSURE: 144 MMHG | TEMPERATURE: 98 F | RESPIRATION RATE: 16 BRPM

## 2019-10-17 DIAGNOSIS — C18.6 MALIGNANT NEOPLASM OF DESCENDING COLON (HCC): Primary | ICD-10-CM

## 2019-10-17 PROCEDURE — 96372 THER/PROPH/DIAG INJ SC/IM: CPT

## 2019-10-17 PROCEDURE — 96523 IRRIG DRUG DELIVERY DEVICE: CPT

## 2019-10-17 RX ORDER — HEPARIN SODIUM (PORCINE) LOCK FLUSH IV SOLN 100 UNIT/ML 100 UNIT/ML
5 SOLUTION INTRAVENOUS ONCE
Status: CANCELLED | OUTPATIENT
Start: 2019-10-17

## 2019-10-17 RX ORDER — 0.9 % SODIUM CHLORIDE 0.9 %
10 VIAL (ML) INJECTION ONCE
Status: CANCELLED | OUTPATIENT
Start: 2019-10-17

## 2019-10-17 RX ORDER — HEPARIN SODIUM (PORCINE) LOCK FLUSH IV SOLN 100 UNIT/ML 100 UNIT/ML
5 SOLUTION INTRAVENOUS ONCE
Status: COMPLETED | OUTPATIENT
Start: 2019-10-17 | End: 2019-10-17

## 2019-10-17 RX ADMIN — HEPARIN SODIUM (PORCINE) LOCK FLUSH IV SOLN 100 UNIT/ML 500 UNITS: 100 SOLUTION INTRAVENOUS at 09:45:00

## 2019-10-17 NOTE — PROGRESS NOTES
Kely Lozano arrived ambulating independently for CADD pump disconnect. He reports feeling fatigued and nauseated, using prn antiemetics. However he does feel his neuropathy is improving with the use of gabapentin.      Patient presented with CADD pump connected

## 2019-10-21 ENCOUNTER — TELEPHONE (OUTPATIENT)
Dept: HEMATOLOGY/ONCOLOGY | Facility: HOSPITAL | Age: 54
End: 2019-10-21

## 2019-10-22 ENCOUNTER — APPOINTMENT (OUTPATIENT)
Dept: INTEGRATIVE MEDICINE | Facility: HOSPITAL | Age: 54
End: 2019-10-22
Attending: FAMILY MEDICINE

## 2019-10-24 ENCOUNTER — TELEPHONE (OUTPATIENT)
Dept: HEMATOLOGY/ONCOLOGY | Facility: HOSPITAL | Age: 54
End: 2019-10-24

## 2019-10-24 NOTE — TELEPHONE ENCOUNTER
Juan Carlos Bullock called saying he has been having a lot of pain in his feet. He says they are black, and is having a hard time walking. He said he would like, if possible, a note from a doctor so he can have a closer parking spot at work because of this.  He was als

## 2019-10-24 NOTE — TELEPHONE ENCOUNTER
I called Francisco Xiong and updated him that [facility] wants him to increase his Gabapentin to 1 capsule three times a day. I cautioned him that it can cause drowsiness. I also told him that Florence Paez said he can also use Tylenol to help with the pain.  If the [facility]

## 2019-10-24 NOTE — TELEPHONE ENCOUNTER
Toxicities: C9D1 Fluorouracil/Leucovorian Calcium/Oxilaplatin/Bevacizumab on 10/14/2019 - Oxaliplatin held this cycle due to peripheral neuropathy     Peripheral Sensory Neuropathy     Peripheral Sensory Neuropathy: Grade 2 (Pt has been having numbness in

## 2019-10-24 NOTE — TELEPHONE ENCOUNTER
I called Kely Lozano to let him know the form is ready for  at the . He needs to fill in the rest of the form and present it to the [address] [place] [address]. He will come in to  the form.

## 2019-10-28 ENCOUNTER — NURSE ONLY (OUTPATIENT)
Dept: HEMATOLOGY/ONCOLOGY | Facility: HOSPITAL | Age: 54
End: 2019-10-28
Attending: INTERNAL MEDICINE
Payer: COMMERCIAL

## 2019-10-28 VITALS
HEART RATE: 72 BPM | DIASTOLIC BLOOD PRESSURE: 81 MMHG | BODY MASS INDEX: 36.32 KG/M2 | SYSTOLIC BLOOD PRESSURE: 153 MMHG | RESPIRATION RATE: 16 BRPM | TEMPERATURE: 98 F | HEIGHT: 74 IN | OXYGEN SATURATION: 98 % | WEIGHT: 283 LBS

## 2019-10-28 DIAGNOSIS — Z51.11 CHEMOTHERAPY MANAGEMENT, ENCOUNTER FOR: ICD-10-CM

## 2019-10-28 DIAGNOSIS — C18.9 COLON CANCER METASTASIZED TO LIVER (HCC): Primary | ICD-10-CM

## 2019-10-28 DIAGNOSIS — C78.7 COLON CANCER METASTASIZED TO LIVER (HCC): Primary | ICD-10-CM

## 2019-10-28 DIAGNOSIS — T45.1X5A PERIPHERAL NEUROPATHY DUE TO CHEMOTHERAPY (HCC): ICD-10-CM

## 2019-10-28 DIAGNOSIS — C18.6 MALIGNANT NEOPLASM OF DESCENDING COLON (HCC): Primary | ICD-10-CM

## 2019-10-28 DIAGNOSIS — G62.0 PERIPHERAL NEUROPATHY DUE TO CHEMOTHERAPY (HCC): ICD-10-CM

## 2019-10-28 PROCEDURE — 80053 COMPREHEN METABOLIC PANEL: CPT

## 2019-10-28 PROCEDURE — 85025 COMPLETE CBC W/AUTO DIFF WBC: CPT

## 2019-10-28 PROCEDURE — 99215 OFFICE O/P EST HI 40 MIN: CPT | Performed by: PHYSICIAN ASSISTANT

## 2019-10-28 PROCEDURE — 81003 URINALYSIS AUTO W/O SCOPE: CPT

## 2019-10-28 PROCEDURE — 36415 COLL VENOUS BLD VENIPUNCTURE: CPT

## 2019-10-28 RX ORDER — 0.9 % SODIUM CHLORIDE 0.9 %
VIAL (ML) INJECTION
Status: DISCONTINUED
Start: 2019-10-28 | End: 2019-10-28 | Stop reason: WASHOUT

## 2019-10-28 RX ORDER — FLUOROURACIL 50 MG/ML
2400 INJECTION, SOLUTION INTRAVENOUS CONTINUOUS
Status: CANCELLED | OUTPATIENT
Start: 2019-10-29

## 2019-10-28 NOTE — PROGRESS NOTES
Cancer Center Progress Note    Patient Name: Karla Davis   YOB: 1965   Medical Record Number: [id number]   CSN: [id number]   Consulting Physician: Cata Stratton MD  Referring Physician(s): Yvan Duncan  Date of Visit: 10/14/2019     Chief recommended to correlate with current colonoscopy findings. Patient continues to feel well without any symptoms of disease or systemic signs of illness.   Denies any bleeding or bruising symptoms, no chest pain, no dyspnea, no nausea, vomiting abdominal pa Aunt 73        pancreatic cancer   • Cancer Maternal Aunt 68        bladder cancer; tobacco user   • Glaucoma Neg    • Bleeding Disorders Neg    • Anemia Neg    • Sickle Cell Neg    • Clotting Disorder Neg            Social History:  Social History    Soci Weight Concern: Not Asked        Special Diet: Not Asked        Back Care: Not Asked        Exercise: Not Asked        Bike Helmet: Not Asked        Seat Belt: Not Asked        Self-Exams: Not Asked    Social History Narrative      [place] is ; lund fever, night sweats and weight loss; +fatigue  Eyes: Negative for visual disturbance, irritation and redness. Respiratory: Negative for cough, hemoptysis, chest pain, or dyspnea on exertion.   Gastrointestinal: Negative for dysphagia, odynophagia, nausea, Results   Component Value Date/Time    WBC 8.3 10/28/2019 09:44 AM    RBC 4.53 10/28/2019 09:44 AM    HGB 13.0 10/28/2019 09:44 AM    HCT 41.8 10/28/2019 09:44 AM    MCV 92.3 10/28/2019 09:44 AM    MCH 28.7 10/28/2019 09:44 AM    MCHC 31.1 10/28/2019 09:44 suspicious for metastatic disease.     --We will plan to treat with palliative intent chemotherapy using FOLFOX with bevacizumab for 6 months prior to surgical resection of the primary colonic mass and regional lymph node evaluation    --Pretreatment CEA is

## 2019-10-29 ENCOUNTER — OFFICE VISIT (OUTPATIENT)
Dept: HEMATOLOGY/ONCOLOGY | Facility: HOSPITAL | Age: 54
End: 2019-10-29
Attending: INTERNAL MEDICINE
Payer: COMMERCIAL

## 2019-10-29 VITALS
RESPIRATION RATE: 16 BRPM | TEMPERATURE: 98 F | DIASTOLIC BLOOD PRESSURE: 90 MMHG | HEART RATE: 75 BPM | OXYGEN SATURATION: 98 % | SYSTOLIC BLOOD PRESSURE: 154 MMHG

## 2019-10-29 DIAGNOSIS — C18.6 MALIGNANT NEOPLASM OF DESCENDING COLON (HCC): Primary | ICD-10-CM

## 2019-10-29 PROCEDURE — 96367 TX/PROPH/DG ADDL SEQ IV INF: CPT

## 2019-10-29 PROCEDURE — 96413 CHEMO IV INFUSION 1 HR: CPT

## 2019-10-29 RX ORDER — FLUOROURACIL 50 MG/ML
2400 INJECTION, SOLUTION INTRAVENOUS CONTINUOUS
Status: DISCONTINUED | OUTPATIENT
Start: 2019-10-29 | End: 2019-10-29

## 2019-10-29 RX ORDER — 0.9 % SODIUM CHLORIDE 0.9 %
VIAL (ML) INJECTION
Status: DISCONTINUED
Start: 2019-10-29 | End: 2019-10-29

## 2019-10-29 RX ADMIN — FLUOROURACIL 6000 MG: 50 INJECTION, SOLUTION INTRAVENOUS at 12:10:00

## 2019-10-29 NOTE — PROGRESS NOTES
Edgar to infusion for C10 D1 Avastin, Leucovorin and 5fu CADD. Arrives Ambulating independently, accompanied by Self. He is feeling pretty well, c/o continued PN to his feet, slight PN to fingertips.  He is off the Oxaliplatin and on Neurontin with sligh

## 2019-10-31 ENCOUNTER — NURSE ONLY (OUTPATIENT)
Dept: HEMATOLOGY/ONCOLOGY | Facility: HOSPITAL | Age: 54
End: 2019-10-31
Attending: INTERNAL MEDICINE
Payer: COMMERCIAL

## 2019-10-31 PROCEDURE — 96523 IRRIG DRUG DELIVERY DEVICE: CPT

## 2019-11-04 ENCOUNTER — TELEPHONE (OUTPATIENT)
Dept: HEMATOLOGY/ONCOLOGY | Facility: HOSPITAL | Age: 54
End: 2019-11-04

## 2019-11-04 DIAGNOSIS — C18.6 MALIGNANT NEOPLASM OF DESCENDING COLON (HCC): ICD-10-CM

## 2019-11-04 DIAGNOSIS — C78.7 COLON CANCER METASTASIZED TO LIVER (HCC): ICD-10-CM

## 2019-11-04 DIAGNOSIS — C18.9 COLON CANCER METASTASIZED TO LIVER (HCC): ICD-10-CM

## 2019-11-04 DIAGNOSIS — G62.0 CHEMOTHERAPY-INDUCED NEUROPATHY (HCC): ICD-10-CM

## 2019-11-04 DIAGNOSIS — T45.1X5A CHEMOTHERAPY-INDUCED NEUROPATHY (HCC): ICD-10-CM

## 2019-11-04 RX ORDER — ONDANSETRON HYDROCHLORIDE 8 MG/1
8 TABLET, FILM COATED ORAL EVERY 8 HOURS PRN
Qty: 30 TABLET | Refills: 3 | Status: SHIPPED | OUTPATIENT
Start: 2019-11-04 | End: 2019-11-22

## 2019-11-04 RX ORDER — GABAPENTIN 300 MG/1
600 CAPSULE ORAL 3 TIMES DAILY
Qty: 180 CAPSULE | Refills: 2 | Status: SHIPPED | OUTPATIENT
Start: 2019-11-04 | End: 2019-11-22 | Stop reason: DRUGHIGH

## 2019-11-04 RX ORDER — ONDANSETRON HYDROCHLORIDE 8 MG/1
8 TABLET, FILM COATED ORAL EVERY 8 HOURS PRN
Qty: 30 TABLET | Refills: 3 | OUTPATIENT
Start: 2019-11-04

## 2019-11-04 NOTE — TELEPHONE ENCOUNTER
Jessica Harris contacted. Reports he has been taking the gabapentin 300 mg three times a day. Still having neuropathy in feet. Denies increased fatigue with dose.   Reassured this can take time to resolve; Dr Christy Merlos notified and received order to increase his cresencio

## 2019-11-04 NOTE — TELEPHONE ENCOUNTER
Alyse Lucas says he is still having problems with his feet, and would like Dr. Barry Ellington to give him a higher dosage of his medication. Please advise.

## 2019-11-11 ENCOUNTER — OFFICE VISIT (OUTPATIENT)
Dept: HEMATOLOGY/ONCOLOGY | Facility: HOSPITAL | Age: 54
End: 2019-11-11
Attending: INTERNAL MEDICINE
Payer: COMMERCIAL

## 2019-11-11 VITALS
HEART RATE: 78 BPM | WEIGHT: 283 LBS | HEIGHT: 74 IN | DIASTOLIC BLOOD PRESSURE: 92 MMHG | SYSTOLIC BLOOD PRESSURE: 154 MMHG | RESPIRATION RATE: 18 BRPM | TEMPERATURE: 98 F | OXYGEN SATURATION: 98 % | BODY MASS INDEX: 36.32 KG/M2

## 2019-11-11 DIAGNOSIS — C78.7 COLON CANCER METASTASIZED TO LIVER (HCC): Primary | ICD-10-CM

## 2019-11-11 DIAGNOSIS — C18.6 MALIGNANT NEOPLASM OF DESCENDING COLON (HCC): ICD-10-CM

## 2019-11-11 DIAGNOSIS — Z51.11 CHEMOTHERAPY MANAGEMENT, ENCOUNTER FOR: ICD-10-CM

## 2019-11-11 DIAGNOSIS — G62.0 CHEMOTHERAPY-INDUCED NEUROPATHY (HCC): ICD-10-CM

## 2019-11-11 DIAGNOSIS — C18.9 COLON CANCER METASTASIZED TO LIVER (HCC): Primary | ICD-10-CM

## 2019-11-11 DIAGNOSIS — T45.1X5A CHEMOTHERAPY-INDUCED NEUROPATHY (HCC): ICD-10-CM

## 2019-11-11 DIAGNOSIS — C18.6 MALIGNANT NEOPLASM OF DESCENDING COLON (HCC): Primary | ICD-10-CM

## 2019-11-11 PROCEDURE — 36415 COLL VENOUS BLD VENIPUNCTURE: CPT

## 2019-11-11 PROCEDURE — 85025 COMPLETE CBC W/AUTO DIFF WBC: CPT

## 2019-11-11 PROCEDURE — 80053 COMPREHEN METABOLIC PANEL: CPT

## 2019-11-11 PROCEDURE — 99215 OFFICE O/P EST HI 40 MIN: CPT | Performed by: INTERNAL MEDICINE

## 2019-11-11 RX ORDER — FLUOROURACIL 50 MG/ML
2400 INJECTION, SOLUTION INTRAVENOUS CONTINUOUS
Status: CANCELLED | OUTPATIENT
Start: 2019-11-12

## 2019-11-11 NOTE — PROGRESS NOTES
Cancer Center Progress Note    Patient Name: Kesha Shaw   YOB: 1965   Medical Record Number: [id number]   CSN: [id number]   Consulting Physician: Philippe Brown MD  Referring Physician(s): Maribeth Parish  Date of Visit: 11/11/2019     Chief recommended to correlate with current colonoscopy findings. Patient continues to feel well without any symptoms of disease or systemic signs of illness.   Denies any bleeding or bruising symptoms, no chest pain, no dyspnea, no nausea, vomiting abdominal pa Neg    • Bleeding Disorders Neg    • Anemia Neg    • Sickle Cell Neg    • Clotting Disorder Neg            Social History:  Social History    Socioeconomic History      Marital status: OTHER      Spouse name: Not on file      Number of children: 4      Yea Bike Helmet: Not Asked        Seat Belt: Not Asked        Self-Exams: Not Asked    Social History Narrative      Jyoti Comer is ; has a long term partner, Kait Ford, 4 yrs. Father of 4 children; ages 32-20.  He works in  for [employer]:    Constitutional: Negative for anorexia, chills, fever, night sweats and weight loss; +fatigue  Eyes: Negative for visual disturbance, irritation and redness. Respiratory: Negative for cough, hemoptysis, chest pain, or dyspnea on exertion.   Dionisio Results   Component Value Date/Time    WBC 5.4 11/11/2019 09:31 AM    RBC 4.66 11/11/2019 09:31 AM    HGB 13.3 11/11/2019 09:31 AM    HCT 42.3 11/11/2019 09:31 AM    MCV 90.8 11/11/2019 09:31 AM    MCH 28.5 11/11/2019 09:31 AM    MCHC 31.4 11/11/2019 09:31 primary colonic mass and regional lymph node evaluation    --Pretreatment CEA is not elevated at 1.9 ng/mL; discussed will need to follow trends closely in light of his normal value result in the presence of known cancer    --Consented to systemic treatmen

## 2019-11-12 ENCOUNTER — OFFICE VISIT (OUTPATIENT)
Dept: HEMATOLOGY/ONCOLOGY | Facility: HOSPITAL | Age: 54
End: 2019-11-12
Attending: INTERNAL MEDICINE
Payer: COMMERCIAL

## 2019-11-12 VITALS
RESPIRATION RATE: 18 BRPM | DIASTOLIC BLOOD PRESSURE: 85 MMHG | SYSTOLIC BLOOD PRESSURE: 146 MMHG | HEART RATE: 73 BPM | TEMPERATURE: 98 F

## 2019-11-12 DIAGNOSIS — C18.6 MALIGNANT NEOPLASM OF DESCENDING COLON (HCC): Primary | ICD-10-CM

## 2019-11-12 PROCEDURE — 96375 TX/PRO/DX INJ NEW DRUG ADDON: CPT

## 2019-11-12 PROCEDURE — 96367 TX/PROPH/DG ADDL SEQ IV INF: CPT

## 2019-11-12 PROCEDURE — 96413 CHEMO IV INFUSION 1 HR: CPT

## 2019-11-12 RX ORDER — FLUOROURACIL 50 MG/ML
2400 INJECTION, SOLUTION INTRAVENOUS CONTINUOUS
Status: DISCONTINUED | OUTPATIENT
Start: 2019-11-12 | End: 2019-11-12

## 2019-11-12 RX ORDER — 0.9 % SODIUM CHLORIDE 0.9 %
VIAL (ML) INJECTION
Status: DISCONTINUED
Start: 2019-11-12 | End: 2019-11-12

## 2019-11-12 RX ADMIN — FLUOROURACIL 6050 MG: 50 INJECTION, SOLUTION INTRAVENOUS at 11:53:00

## 2019-11-12 NOTE — PROGRESS NOTES
Pt here for C11D1 , Avastin/ 5FU and leucovorin. Arrives Ambulating independently, accompanied by Self           Modifications in dose or schedule: No. Oxaliplatin preiously removed due to neuropathy.      Luis Jenkins confirms his neuropathy in his feet is sti

## 2019-11-14 ENCOUNTER — NURSE ONLY (OUTPATIENT)
Dept: HEMATOLOGY/ONCOLOGY | Facility: HOSPITAL | Age: 54
End: 2019-11-14
Attending: INTERNAL MEDICINE
Payer: COMMERCIAL

## 2019-11-14 VITALS
RESPIRATION RATE: 18 BRPM | HEART RATE: 77 BPM | SYSTOLIC BLOOD PRESSURE: 151 MMHG | TEMPERATURE: 98 F | DIASTOLIC BLOOD PRESSURE: 94 MMHG

## 2019-11-14 DIAGNOSIS — C18.6 MALIGNANT NEOPLASM OF DESCENDING COLON (HCC): Primary | ICD-10-CM

## 2019-11-14 PROCEDURE — 96523 IRRIG DRUG DELIVERY DEVICE: CPT

## 2019-11-14 RX ORDER — HEPARIN SODIUM (PORCINE) LOCK FLUSH IV SOLN 100 UNIT/ML 100 UNIT/ML
5 SOLUTION INTRAVENOUS ONCE
Status: CANCELLED | OUTPATIENT
Start: 2019-11-14

## 2019-11-14 RX ORDER — HEPARIN SODIUM (PORCINE) LOCK FLUSH IV SOLN 100 UNIT/ML 100 UNIT/ML
5 SOLUTION INTRAVENOUS ONCE
Status: COMPLETED | OUTPATIENT
Start: 2019-11-14 | End: 2019-11-14

## 2019-11-14 RX ORDER — 0.9 % SODIUM CHLORIDE 0.9 %
10 VIAL (ML) INJECTION ONCE
Status: CANCELLED | OUTPATIENT
Start: 2019-11-14

## 2019-11-14 RX ADMIN — HEPARIN SODIUM (PORCINE) LOCK FLUSH IV SOLN 100 UNIT/ML 500 UNITS: 100 SOLUTION INTRAVENOUS at 09:44:00

## 2019-11-22 ENCOUNTER — OFFICE VISIT (OUTPATIENT)
Dept: NEUROLOGY | Facility: CLINIC | Age: 54
End: 2019-11-22
Payer: COMMERCIAL

## 2019-11-22 ENCOUNTER — OFFICE VISIT (OUTPATIENT)
Dept: HEMATOLOGY/ONCOLOGY | Facility: HOSPITAL | Age: 54
End: 2019-11-22
Attending: INTERNAL MEDICINE
Payer: COMMERCIAL

## 2019-11-22 VITALS
TEMPERATURE: 98 F | OXYGEN SATURATION: 97 % | HEART RATE: 68 BPM | RESPIRATION RATE: 18 BRPM | WEIGHT: 287 LBS | BODY MASS INDEX: 36.83 KG/M2 | SYSTOLIC BLOOD PRESSURE: 155 MMHG | HEIGHT: 74 IN | DIASTOLIC BLOOD PRESSURE: 93 MMHG

## 2019-11-22 VITALS — DIASTOLIC BLOOD PRESSURE: 94 MMHG | SYSTOLIC BLOOD PRESSURE: 160 MMHG | HEART RATE: 80 BPM

## 2019-11-22 DIAGNOSIS — G62.0 PERIPHERAL NEUROPATHY DUE TO CHEMOTHERAPY (HCC): ICD-10-CM

## 2019-11-22 DIAGNOSIS — C18.6 MALIGNANT NEOPLASM OF DESCENDING COLON (HCC): Primary | ICD-10-CM

## 2019-11-22 DIAGNOSIS — C78.7 COLON CANCER METASTASIZED TO LIVER (HCC): ICD-10-CM

## 2019-11-22 DIAGNOSIS — T45.1X5A PERIPHERAL NEUROPATHY DUE TO CHEMOTHERAPY (HCC): Primary | ICD-10-CM

## 2019-11-22 DIAGNOSIS — T45.1X5A PERIPHERAL NEUROPATHY DUE TO CHEMOTHERAPY (HCC): ICD-10-CM

## 2019-11-22 DIAGNOSIS — I10 ESSENTIAL HYPERTENSION: ICD-10-CM

## 2019-11-22 DIAGNOSIS — C18.9 COLON CANCER METASTASIZED TO LIVER (HCC): ICD-10-CM

## 2019-11-22 DIAGNOSIS — R20.0 NUMBNESS AND TINGLING: ICD-10-CM

## 2019-11-22 DIAGNOSIS — R20.2 NUMBNESS AND TINGLING: ICD-10-CM

## 2019-11-22 DIAGNOSIS — Z51.11 CHEMOTHERAPY MANAGEMENT, ENCOUNTER FOR: ICD-10-CM

## 2019-11-22 DIAGNOSIS — G62.0 PERIPHERAL NEUROPATHY DUE TO CHEMOTHERAPY (HCC): Primary | ICD-10-CM

## 2019-11-22 PROCEDURE — 85025 COMPLETE CBC W/AUTO DIFF WBC: CPT

## 2019-11-22 PROCEDURE — 80053 COMPREHEN METABOLIC PANEL: CPT

## 2019-11-22 PROCEDURE — 99244 OFF/OP CNSLTJ NEW/EST MOD 40: CPT | Performed by: PHYSICAL MEDICINE & REHABILITATION

## 2019-11-22 PROCEDURE — 36415 COLL VENOUS BLD VENIPUNCTURE: CPT

## 2019-11-22 PROCEDURE — 99215 OFFICE O/P EST HI 40 MIN: CPT | Performed by: PHYSICIAN ASSISTANT

## 2019-11-22 RX ORDER — FLUOROURACIL 50 MG/ML
2400 INJECTION, SOLUTION INTRAVENOUS CONTINUOUS
Status: CANCELLED | OUTPATIENT
Start: 2019-11-26

## 2019-11-22 RX ORDER — GABAPENTIN 800 MG/1
800 TABLET ORAL 3 TIMES DAILY
Qty: 90 TABLET | Refills: 0 | Status: SHIPPED | OUTPATIENT
Start: 2019-11-22 | End: 2020-01-01

## 2019-11-22 NOTE — PATIENT INSTRUCTIONS
1. Proceed with chemotherapy on Monday, 11/25/19    2. With a qtip, soothe nostrils with neosporin (triple antibiotic cream) twice daily    3.   PET/CT scan the week of 12/9/19.      4.  Follow-up with Dr. Pantera Hull after PET is complete

## 2019-11-22 NOTE — PATIENT INSTRUCTIONS
-Increase Gabapentin as advised today  -Follow up in 4 weeks  -If no better, will consider adding Nortryptyline medication  -Will consider further imaging

## 2019-11-22 NOTE — PROGRESS NOTES
[address]  NEW PATIENT EVALUATION    Consultation as a request of Dr. Parker Muhammad    Chief Complaint: bilateral hand and feet pain.     HISTORY OF PRESENT ILLNESS:   Patient presents with:  Numbness: New patient her • Back problem    • Chronic low back pain     lumbar spine issue   • Colon cancer (HCC)    • High blood pressure          PAST SURGICAL HISTORY:     Past Surgical History:   Procedure Laterality Date   • CATHETER INSERTION PORT-A-CATH Right 4/26/2019 Cell Neg    • Clotting Disorder Neg           SOCIAL HISTORY:   Social History    Tobacco Use      Smoking status: Never Smoker      Smokeless tobacco: Never Used      Tobacco comment: rare cigar    Alcohol use: Yes      Comment: special occassions    Drug appropriate    Gait Normal  Able to toe walk and heel walk without any difficulty  Posture: No scoliosis or kyphosis    Musculoskeletal/Neurological Exam:    LUMBAR SPINE:  Inspection: no erythema, swelling, or obvious deformity.   Their iliac crest and david metastases  3. There is a hypermetabolic focus at the splenic flexure. This could represent the site of the recently biopsied malignant sessile polyp  4.  The hypermetabolic bowel wall thickening seen in the ascending colon is nonspecific and physiologic a Rehabilitation/Sports Medicine

## 2019-11-22 NOTE — PROGRESS NOTES
Cancer Center Progress Note    Patient Name: Theresa Castillo   YOB: 1965   Medical Record Number: [id number]   CSN: [id number]   Consulting Physician: Nette Wall MD  Referring Physician(s): Ibrahima Watson  Date of Visit: 11/11/2019     Chief recommended to correlate with current colonoscopy findings. Patient continues to feel well without any symptoms of disease or systemic signs of illness.   Denies any bleeding or bruising symptoms, no chest pain, no dyspnea, no nausea, vomiting abdominal pa Mother    • Diabetes Maternal Aunt    • Cancer Maternal Aunt 68        pancreatic cancer   • Cancer Maternal Aunt 68        bladder cancer; tobacco user   • Glaucoma Neg    • Bleeding Disorders Neg    • Anemia Neg    • Sickle Cell Neg    • Clotting Disorde Concern: Not Asked        Stress Concern: Not Asked        Weight Concern: Not Asked        Special Diet: Not Asked        Back Care: Not Asked        Exercise: Not Asked        Bike Helmet: Not Asked        Seat Belt: Not Asked        Self-Exams: Not Aske exertion.   Gastrointestinal: Negative for dysphagia, odynophagia, nausea, vomiting, change in bowel habits, diarrhea, constipation and abdominal pain; +hoarse (intermittent), +mild, intermittent mouth sores  Integument: Negative for rash, skin lesions, and 11/22/2019 09:51 AM    NEPRELIM 1.70 11/22/2019 09:51 AM    .0 11/22/2019 09:51 AM       Lab Results   Component Value Date/Time    GLU 97 11/22/2019 09:51 AM    BUN 19 (H) 11/22/2019 09:51 AM    CREATSERUM 1.16 11/22/2019 09:51 AM    GFRNAA 71 11/2 FOLFOX with bevacizumab for 6 months prior to surgical resection of the primary colonic mass and regional lymph node evaluation    --Pretreatment CEA is not elevated at 1.9 ng/mL; discussed will need to follow trends closely in light of [place] [id number]

## 2019-11-25 ENCOUNTER — OFFICE VISIT (OUTPATIENT)
Dept: HEMATOLOGY/ONCOLOGY | Facility: HOSPITAL | Age: 54
End: 2019-11-25
Attending: INTERNAL MEDICINE
Payer: COMMERCIAL

## 2019-11-25 VITALS
RESPIRATION RATE: 16 BRPM | DIASTOLIC BLOOD PRESSURE: 96 MMHG | TEMPERATURE: 98 F | HEART RATE: 73 BPM | SYSTOLIC BLOOD PRESSURE: 152 MMHG | OXYGEN SATURATION: 97 %

## 2019-11-25 DIAGNOSIS — C18.6 MALIGNANT NEOPLASM OF DESCENDING COLON (HCC): Primary | ICD-10-CM

## 2019-11-25 PROCEDURE — 96365 THER/PROPH/DIAG IV INF INIT: CPT

## 2019-11-25 PROCEDURE — 96375 TX/PRO/DX INJ NEW DRUG ADDON: CPT

## 2019-11-25 PROCEDURE — 96413 CHEMO IV INFUSION 1 HR: CPT

## 2019-11-25 RX ORDER — FLUOROURACIL 50 MG/ML
2400 INJECTION, SOLUTION INTRAVENOUS CONTINUOUS
Status: DISCONTINUED | OUTPATIENT
Start: 2019-11-25 | End: 2019-11-25

## 2019-11-25 RX ORDER — 0.9 % SODIUM CHLORIDE 0.9 %
VIAL (ML) INJECTION
Status: DISCONTINUED
Start: 2019-11-25 | End: 2019-11-25

## 2019-11-25 RX ADMIN — FLUOROURACIL 6050 MG: 50 INJECTION, SOLUTION INTRAVENOUS at 11:27:00

## 2019-11-25 NOTE — PROGRESS NOTES
Pt here for C12D1 , Avastin/ 5FU and leucovorin. Arrives Ambulating independently, accompanied by Self           Modifications in dose or schedule: No. Oxaliplatin preiously removed due to neuropathy in cycle 7.      Rajesh Mckoy confirms his neuropathy in his

## 2019-11-27 ENCOUNTER — NURSE ONLY (OUTPATIENT)
Dept: HEMATOLOGY/ONCOLOGY | Facility: HOSPITAL | Age: 54
End: 2019-11-27
Attending: INTERNAL MEDICINE
Payer: COMMERCIAL

## 2019-11-27 DIAGNOSIS — C18.6 MALIGNANT NEOPLASM OF DESCENDING COLON (HCC): Primary | ICD-10-CM

## 2019-11-27 PROCEDURE — 96523 IRRIG DRUG DELIVERY DEVICE: CPT

## 2019-11-27 RX ORDER — 0.9 % SODIUM CHLORIDE 0.9 %
10 VIAL (ML) INJECTION ONCE
Status: CANCELLED | OUTPATIENT
Start: 2019-11-27

## 2019-11-27 RX ORDER — HEPARIN SODIUM (PORCINE) LOCK FLUSH IV SOLN 100 UNIT/ML 100 UNIT/ML
5 SOLUTION INTRAVENOUS ONCE
Status: CANCELLED | OUTPATIENT
Start: 2019-11-27

## 2019-11-27 RX ORDER — HEPARIN SODIUM (PORCINE) LOCK FLUSH IV SOLN 100 UNIT/ML 100 UNIT/ML
5 SOLUTION INTRAVENOUS ONCE
Status: COMPLETED | OUTPATIENT
Start: 2019-11-27 | End: 2019-11-27

## 2019-11-27 RX ADMIN — HEPARIN SODIUM (PORCINE) LOCK FLUSH IV SOLN 100 UNIT/ML 500 UNITS: 100 SOLUTION INTRAVENOUS at 10:53:00

## 2019-12-04 ENCOUNTER — OFFICE VISIT (OUTPATIENT)
Dept: INTEGRATIVE MEDICINE | Facility: CLINIC | Age: 54
End: 2019-12-04
Payer: COMMERCIAL

## 2019-12-04 DIAGNOSIS — Z71.3 NUTRITIONAL COUNSELING: ICD-10-CM

## 2019-12-04 PROCEDURE — 97802 MEDICAL NUTRITION INDIV IN: CPT | Performed by: NUTRITIONIST

## 2019-12-04 NOTE — PROGRESS NOTES
Self Referred  Did patient complete Nutritional Therapy Questionnaire? NO    Lorena Diaz is a 47year old male presenting for medical nutrition therapy in regards to eat healthier. Patient had last chemo therapy treatment.   Through treatment, lost 80 l PM

## 2019-12-10 ENCOUNTER — APPOINTMENT (OUTPATIENT)
Dept: INTEGRATIVE MEDICINE | Facility: HOSPITAL | Age: 54
End: 2019-12-10
Attending: PHYSICIAN ASSISTANT
Payer: COMMERCIAL

## 2019-12-10 ENCOUNTER — APPOINTMENT (OUTPATIENT)
Dept: INTEGRATIVE MEDICINE | Facility: HOSPITAL | Age: 54
End: 2019-12-10
Attending: FAMILY MEDICINE

## 2019-12-10 ENCOUNTER — HOSPITAL ENCOUNTER (OUTPATIENT)
Dept: NUCLEAR MEDICINE | Facility: HOSPITAL | Age: 54
Discharge: HOME | End: 2019-12-10
Attending: PHYSICIAN ASSISTANT
Payer: COMMERCIAL

## 2019-12-10 DIAGNOSIS — C18.6 MALIGNANT NEOPLASM OF DESCENDING COLON (HCC): ICD-10-CM

## 2019-12-10 DIAGNOSIS — C78.7 COLON CANCER METASTASIZED TO LIVER (HCC): ICD-10-CM

## 2019-12-10 DIAGNOSIS — C18.9 COLON CANCER METASTASIZED TO LIVER (HCC): ICD-10-CM

## 2019-12-10 PROCEDURE — 82962 GLUCOSE BLOOD TEST: CPT

## 2019-12-10 PROCEDURE — 78815 PET IMAGE W/CT SKULL-THIGH: CPT | Performed by: PHYSICIAN ASSISTANT

## 2019-12-11 ENCOUNTER — OFFICE VISIT (OUTPATIENT)
Dept: HEMATOLOGY/ONCOLOGY | Facility: HOSPITAL | Age: 54
End: 2019-12-11
Attending: INTERNAL MEDICINE
Payer: COMMERCIAL

## 2019-12-11 VITALS
HEART RATE: 67 BPM | RESPIRATION RATE: 18 BRPM | SYSTOLIC BLOOD PRESSURE: 157 MMHG | BODY MASS INDEX: 37.6 KG/M2 | DIASTOLIC BLOOD PRESSURE: 94 MMHG | WEIGHT: 293 LBS | OXYGEN SATURATION: 99 % | TEMPERATURE: 98 F | HEIGHT: 74 IN

## 2019-12-11 DIAGNOSIS — C78.7 COLON CANCER METASTASIZED TO LIVER (HCC): ICD-10-CM

## 2019-12-11 DIAGNOSIS — C18.6 MALIGNANT NEOPLASM OF DESCENDING COLON (HCC): Primary | ICD-10-CM

## 2019-12-11 DIAGNOSIS — Z08 ENCOUNTER FOR FOLLOW-UP SURVEILLANCE OF COLON CANCER: ICD-10-CM

## 2019-12-11 DIAGNOSIS — Z85.038 ENCOUNTER FOR FOLLOW-UP SURVEILLANCE OF COLON CANCER: ICD-10-CM

## 2019-12-11 DIAGNOSIS — C18.9 COLON CANCER METASTASIZED TO LIVER (HCC): ICD-10-CM

## 2019-12-11 DIAGNOSIS — C18.9 COLON CANCER (HCC): ICD-10-CM

## 2019-12-11 PROCEDURE — 99215 OFFICE O/P EST HI 40 MIN: CPT | Performed by: INTERNAL MEDICINE

## 2019-12-11 PROCEDURE — 85025 COMPLETE CBC W/AUTO DIFF WBC: CPT

## 2019-12-11 PROCEDURE — 80053 COMPREHEN METABOLIC PANEL: CPT

## 2019-12-11 PROCEDURE — 36415 COLL VENOUS BLD VENIPUNCTURE: CPT

## 2019-12-11 RX ORDER — HEPARIN SODIUM (PORCINE) LOCK FLUSH IV SOLN 100 UNIT/ML 100 UNIT/ML
5 SOLUTION INTRAVENOUS ONCE
Status: DISCONTINUED | OUTPATIENT
Start: 2019-12-11 | End: 2019-12-11

## 2019-12-11 RX ORDER — 0.9 % SODIUM CHLORIDE 0.9 %
10 VIAL (ML) INJECTION ONCE
Status: CANCELLED | OUTPATIENT
Start: 2019-12-11

## 2019-12-11 RX ORDER — HEPARIN SODIUM (PORCINE) LOCK FLUSH IV SOLN 100 UNIT/ML 100 UNIT/ML
5 SOLUTION INTRAVENOUS ONCE
Status: CANCELLED | OUTPATIENT
Start: 2019-12-11

## 2019-12-11 RX ORDER — 0.9 % SODIUM CHLORIDE 0.9 %
VIAL (ML) INJECTION
Status: DISCONTINUED
Start: 2019-12-11 | End: 2019-12-11 | Stop reason: WASHOUT

## 2019-12-11 NOTE — PROGRESS NOTES
Cancer Center Progress Note    Patient Name: Marcio Scales   YOB: 1965   Medical Record Number: [id number]   CSN: [id number]   Consulting Physician: Rigoberto Fairbanks MD  Referring Physician(s): Prabha Murguia  Date of Visit: 12/11/2019     Chief recommended to correlate with current colonoscopy findings. Patient continues to feel well without any symptoms of disease or systemic signs of illness.   Denies any bleeding or bruising symptoms, no chest pain, no dyspnea, no nausea, vomiting abdominal pa status: OTHER      Spouse name: Not on file      Number of children: 4      Years of education: Not on file      Highest education level: Not on file    Occupational History      Occupation: shipping and rec.      Social Needs      Financial resource strain yrs. Father of 4 children; ages 32-20. He works in  for [employer]. He lives his [place] in [place], [place]. He formerly was a , enjoys time with his family, and watching basketball.        Allergies:     Du Negative for myalgias, arthralgias, muscle weakness. Neurological: Negative for headaches, dizziness, speech problems, gait problems and weakness. +peripheral neuropathy    A comprehensive 14 point review of systems was completed.   Pertinent positives and 12/11/2019 12:25 PM    ALKPHO 101 12/11/2019 12:25 PM    AST 17 12/11/2019 12:25 PM    ALT 26 12/11/2019 12:25 PM       Imaging:    PET/CT scan 12/10/19    CONCLUSION:   1. There is history of colon cancer.   There is nonspecific nodular FDG activity withi intent chemotherapy using FOLFOX with bevacizumab for 6 months prior to surgical resection of the primary colonic mass and regional lymph node evaluation    --Pretreatment CEA is not elevated at 1.9 ng/mL; discussed will need to follow trends closely in li [id number] [facility]

## 2019-12-12 ENCOUNTER — OFFICE VISIT (OUTPATIENT)
Dept: SURGERY | Facility: CLINIC | Age: 54
End: 2019-12-12
Payer: COMMERCIAL

## 2019-12-12 DIAGNOSIS — C18.6 MALIGNANT NEOPLASM OF DESCENDING COLON (HCC): Primary | ICD-10-CM

## 2019-12-12 DIAGNOSIS — C78.7 LIVER METASTASIS (HCC): ICD-10-CM

## 2019-12-12 PROCEDURE — 99212 OFFICE O/P EST SF 10 MIN: CPT | Performed by: SURGERY

## 2019-12-16 ENCOUNTER — TELEPHONE (OUTPATIENT)
Dept: SURGERY | Facility: CLINIC | Age: 54
End: 2019-12-16

## 2019-12-16 DIAGNOSIS — R16.0 LIVER MASS: Primary | ICD-10-CM

## 2019-12-16 NOTE — PROGRESS NOTES
Patient presents with:  Colon Cancer: Pt here to discuss surgery options. Pt states completed chemo on 11/27/19 with DR. Gomez. VSS  Gen:  NAD  Abd:  Soft, NTND      AP:  Stage IV colon ca with liver mets to segments 3 and 4b.   Completed neoadjuvant c

## 2019-12-16 NOTE — TELEPHONE ENCOUNTER
MD Aleksandra Garcia, FISH; Kevin Gray LPN             Can you order an MRI of the liver for amarilis?  I also spoke with Dr. Li Lara and he's on board.  Let's schedule him for a \"robotic assisted laparoscopic left colectomy, robotic assisted lap

## 2019-12-16 NOTE — TELEPHONE ENCOUNTER
Called pt to schedule consult with Dr Zunilda Bains. Questions asked/answered. Appt/location confirmed. [facility]/eliana dan.   Future Appointments   Date Time Provider Molly Nicole   12/17/2019 12:45 PM Cherelle Bass [facility]   12/17/2019  1:30

## 2019-12-17 ENCOUNTER — APPOINTMENT (OUTPATIENT)
Dept: INTEGRATIVE MEDICINE | Facility: HOSPITAL | Age: 54
End: 2019-12-17
Attending: FAMILY MEDICINE

## 2019-12-18 ENCOUNTER — OFFICE VISIT (OUTPATIENT)
Dept: NEUROLOGY | Facility: CLINIC | Age: 54
End: 2019-12-18
Payer: COMMERCIAL

## 2019-12-18 ENCOUNTER — TELEPHONE (OUTPATIENT)
Dept: SURGERY | Facility: CLINIC | Age: 54
End: 2019-12-18

## 2019-12-18 ENCOUNTER — OFFICE VISIT (OUTPATIENT)
Dept: SURGERY | Facility: CLINIC | Age: 54
End: 2019-12-18
Payer: COMMERCIAL

## 2019-12-18 VITALS
BODY MASS INDEX: 37.22 KG/M2 | SYSTOLIC BLOOD PRESSURE: 150 MMHG | DIASTOLIC BLOOD PRESSURE: 100 MMHG | RESPIRATION RATE: 24 BRPM | HEIGHT: 74 IN | WEIGHT: 290 LBS | HEART RATE: 96 BPM

## 2019-12-18 DIAGNOSIS — I10 ESSENTIAL HYPERTENSION: ICD-10-CM

## 2019-12-18 DIAGNOSIS — G62.0 PERIPHERAL NEUROPATHY DUE TO CHEMOTHERAPY (HCC): Primary | ICD-10-CM

## 2019-12-18 DIAGNOSIS — Z01.818 PREOP TESTING: Primary | ICD-10-CM

## 2019-12-18 DIAGNOSIS — C78.7 COLON CANCER METASTASIZED TO LIVER (HCC): ICD-10-CM

## 2019-12-18 DIAGNOSIS — R20.2 NUMBNESS AND TINGLING: ICD-10-CM

## 2019-12-18 DIAGNOSIS — C18.9 COLON CANCER METASTASIZED TO LIVER (HCC): ICD-10-CM

## 2019-12-18 DIAGNOSIS — R20.0 NUMBNESS AND TINGLING: ICD-10-CM

## 2019-12-18 DIAGNOSIS — T45.1X5A PERIPHERAL NEUROPATHY DUE TO CHEMOTHERAPY (HCC): Primary | ICD-10-CM

## 2019-12-18 PROCEDURE — 99245 OFF/OP CONSLTJ NEW/EST HI 55: CPT | Performed by: SURGERY

## 2019-12-18 PROCEDURE — 99214 OFFICE O/P EST MOD 30 MIN: CPT | Performed by: PHYSICAL MEDICINE & REHABILITATION

## 2019-12-18 RX ORDER — PREGABALIN 25 MG/1
25 CAPSULE ORAL 2 TIMES DAILY
Qty: 60 CAPSULE | Refills: 3 | Status: ON HOLD | OUTPATIENT
Start: 2019-12-18 | End: 2020-01-09

## 2019-12-18 NOTE — PROGRESS NOTES
[address]  NEW PATIENT EVALUATION      Chief Complaint: bilateral hand and feet pain.     HISTORY OF PRESENT ILLNESS:   Patient presents with:  Neuropathy: Patient here for follow up for BLE/BUE neuropathy r numbness and tingling. Patient denies any injury or trauma. He denies any neck or low back pain.   He has had previous episodes of low back pain with some radiation in the lower extremities and has had epidural injections however the back has not been bot Cap Take 1 capsule (25 mg total) by mouth 2 (two) times daily. 60 capsule 3   • gabapentin 800 MG Oral Tab Take 1 tablet (800 mg total) by mouth 3 (three) times daily.  90 tablet 0   • amLODIPine Besylate 5 MG Oral Tab Take 1 tablet (5 mg total) by mouth on Cans of beer per week      Comment: special occassions    Drug use: No         REVIEW OF SYSTEMS:   Constitutional: negative for chills, fatigue, fevers and weight loss  Eyes: negative for irritation, redness and visual disturbance  Ears, nose, mouth, thro kyphosis    Musculoskeletal/Neurological Exam:    LUMBAR SPINE:  Inspection: no erythema, swelling, or obvious deformity. Their iliac crest and shoulder heights are symmetrical.     Palpation: Non tender to palpation of the spinous process.    ROM: FAROM the recently biopsied malignant sessile polyp  4. The hypermetabolic bowel wall thickening seen in the ascending colon is nonspecific and physiologic activity is commonly seen in this area however malignancy cannot be entirely excluded.   Please correlate w

## 2019-12-18 NOTE — PATIENT INSTRUCTIONS
-Continue Gabapentin 800mg TID  -Start Lyrica 25mg BID and see how you tolerate this  -Continue whirpool contrast baths  -Comfortable shoe wear is important  -Continue CBD cream  -Follow up in 3 months

## 2019-12-18 NOTE — PATIENT INSTRUCTIONS
Surgery:  Robot assisted Liver Resection    Date of Surgery:  TBD    Hosptial:  [address] [facility]   Phone: [phone]    · This is an inpatient procedure.   · Use the provided Chlorhexadine surgical soap(instr Merle's nurse direct line:  [phone]  Monday through Friday 8:30 am to 4:30 pm    For Dr. Pranav Hwang office: [phone]/ Fax: [phone]  After hours you will reach the answering service     [facility] [employer] [place] [address], [phone]

## 2019-12-19 ENCOUNTER — DOCUMENTATION ONLY (OUTPATIENT)
Dept: SURGERY | Facility: CLINIC | Age: 54
End: 2019-12-19

## 2019-12-19 ENCOUNTER — HOSPITAL ENCOUNTER (OUTPATIENT)
Dept: MRI IMAGING | Facility: HOSPITAL | Age: 54
Discharge: HOME | End: 2019-12-19
Attending: SURGERY
Payer: COMMERCIAL

## 2019-12-19 ENCOUNTER — TELEPHONE (OUTPATIENT)
Dept: SURGERY | Facility: CLINIC | Age: 54
End: 2019-12-19

## 2019-12-19 DIAGNOSIS — R16.0 LIVER MASS: ICD-10-CM

## 2019-12-19 DIAGNOSIS — C78.7 COLON CANCER METASTASIZED TO LIVER (HCC): ICD-10-CM

## 2019-12-19 DIAGNOSIS — C18.9 COLON CANCER METASTASIZED TO LIVER (HCC): ICD-10-CM

## 2019-12-19 DIAGNOSIS — C18.6 MALIGNANT NEOPLASM OF DESCENDING COLON (HCC): Primary | ICD-10-CM

## 2019-12-19 PROCEDURE — A9575 INJ GADOTERATE MEGLUMI 0.1ML: HCPCS | Performed by: SURGERY

## 2019-12-19 PROCEDURE — 74183 MRI ABD W/O CNTR FLWD CNTR: CPT | Performed by: SURGERY

## 2019-12-19 NOTE — TELEPHONE ENCOUNTER
Called pt to update him on OR date. Scheduled for 1-23-20. He verbalized understanding and appreciated the call.

## 2019-12-23 ENCOUNTER — TELEPHONE (OUTPATIENT)
Dept: SURGERY | Facility: CLINIC | Age: 54
End: 2019-12-23

## 2019-12-23 ENCOUNTER — TELEPHONE (OUTPATIENT)
Dept: HEMATOLOGY/ONCOLOGY | Facility: HOSPITAL | Age: 54
End: 2019-12-23

## 2019-12-23 DIAGNOSIS — C78.7 SECONDARY MALIGNANT NEOPLASM OF LIVER (HCC): ICD-10-CM

## 2019-12-23 DIAGNOSIS — C18.6 MALIGNANT NEOPLASM OF DESCENDING COLON (HCC): Primary | ICD-10-CM

## 2019-12-23 NOTE — TELEPHONE ENCOUNTER
Pt called looking for MRI liver results. Advised we have report but Dr Rolando Caldwell is in [address] all day today and I may not get a chance to speak with him directly but I would send a message on pt's behalf.   Best call back:  [phone]    Also I suggested pt r

## 2019-12-23 NOTE — TELEPHONE ENCOUNTER
Spoke to Chris and he stated that Dr. Ismael Petersen was in Surgery all day and he just thought that he could Get his results, so he will try Dr. Dean Fore office again, Thanks Rayo Roth.

## 2019-12-23 NOTE — TELEPHONE ENCOUNTER
MD Aidee Da Silva RN; Alvina Chandler LPN             Hi,     Can you order an MRI of the liver for amarilis?  I also spoke with Dr. Abdoul Holden and he's on board.  Let's schedule him for a \"robotic assisted laparoscopic left colectomy, robotic assi

## 2019-12-26 NOTE — H&P (VIEW-ONLY)
[facility]    Patient Name:  Brent De La Cruz   YOB: 1965   Gender:  Male   Appt Date:  12/18/2019   Provider:  Brenda Diaz MD   Insurance:  [facility]     PATIENT PROVIDERS  Referring Provider: Yesica bone History of this present illness dates back to April of this year when the patient underwent screening colonoscopy by Dr. Eula Burgos. That was significant for a sessile polyp within the descending colon which was concerning for malignancy.   The lesion wa Vital Signs: There were no vitals taken for this visit. Medications Reviewed:    Current Outpatient Medications:   •  pregabalin (LYRICA) 25 MG Oral Cap, Take 1 capsule (25 mg total) by mouth 2 (two) times daily. , Disp: 60 capsule, Rfl: 3  •  gabapent Socioeconomic History      Marital status: OTHER      Spouse name: Not on file      Number of children: 4      Years of education: Not on file      Highest education level: Not on file    Occupational History      Occupation: shipping and rec.      [employer] Genitourinary: Negative for dysuria and difficulty urinating. Musculoskeletal: Negative for myalgias. Skin: Negative for color change and pallor. Allergic/Immunologic: Negative for immunocompromised state. Neurological: Positive for numbness.  [facility] LIVER:  There are 2 hepatic lesions which are isointense to spleen on fat suppressed T2 weighted scans, are hypointense to normal liver on fat suppressed T1 and exhibit irregular thick rim enhancement.   These lesions are in segment 4 B and segment 3   (jonel COMPARISON: [facility], MRI ABDOMEN (W+WO) (KXD=14937), 4/12/2019, 16:32. [facility], CT CHEST+ABDOMEN+PELVIS(ALL CNTRST ONLY)(CPT=71260/23396), 4/11/2019, 10:35. INDICATIONS:  Colon cancer, initial treatment strategy. lesion in segment 2 is also hypermetabolic. This measures 1.5 x 2.2 cm (image 142) and has an SUV max measuring 5.7. This is also compatible with metastasis.   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 Restaging.      TECHNIQUE:    A comprehensive MRI examination of the abdomen was performed utilizing a variety of imaging planes and imaging parameters to optimize visua BILIARY:            The gallbladder is without stones or wall thickening. No intrahepatic or extrahepatic biliary dilatation is apparent. PANCREAS:      There is normal signal intensity. No focal mass or main pancreatic duct dilatation is seen.  There i 2. Development of mild hepatic steatosis since the prior MRI. Assessment / Plan: This is a 14-year-old gentleman who was diagnosed with left-sided colon cancer with singular metastasis to the liver.   I reviewed the above images in per

## 2019-12-26 NOTE — CONSULTS
[facility]    Patient Name:  Tiffanie Keane   YOB: 1965   Gender:  Male   Appt Date:  12/18/2019   Provider:  Anish Leyva MD   Insurance:  [facility]     PATIENT PROVIDERS  Referring Provider: Yesica bone History of this present illness dates back to April of this year when the patient underwent screening colonoscopy by Dr. Janki Nowak. That was significant for a sessile polyp within the descending colon which was concerning for malignancy.   The lesion wa Vital Signs: There were no vitals taken for this visit. Medications Reviewed:    Current Outpatient Medications:   •  pregabalin (LYRICA) 25 MG Oral Cap, Take 1 capsule (25 mg total) by mouth 2 (two) times daily. , Disp: 60 capsule, Rfl: 3  •  gabapent Socioeconomic History      Marital status: OTHER      Spouse name: Not on file      Number of children: 4      Years of education: Not on file      Highest education level: Not on file    Occupational History      Occupation: shipping and rec.      [employer] Genitourinary: Negative for dysuria and difficulty urinating. Musculoskeletal: Negative for myalgias. Skin: Negative for color change and pallor. Allergic/Immunologic: Negative for immunocompromised state. Neurological: Positive for numbness.  [facility] Curling LIVER:  There are 2 hepatic lesions which are isointense to spleen on fat suppressed T2 weighted scans, are hypointense to normal liver on fat suppressed T1 and exhibit irregular thick rim enhancement.   These lesions are in segment 4 B and segment 3   (jonel COMPARISON: [facility], MRI ABDOMEN (W+WO) (KPJ=77511), 4/12/2019, 16:32. [facility], CT CHEST+ABDOMEN+PELVIS(ALL CNTRST ONLY)(CPT=71260/83439), 4/11/2019, 10:35. INDICATIONS:  Colon cancer, initial treatment strategy. lesion in segment 2 is also hypermetabolic. This measures 1.5 x 2.2 cm (image 142) and has an SUV max measuring 5.7. This is also compatible with metastasis.   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 Restaging.      TECHNIQUE:    A comprehensive MRI examination of the abdomen was performed utilizing a variety of imaging planes and imaging parameters to optimize visua BILIARY:            The gallbladder is without stones or wall thickening. No intrahepatic or extrahepatic biliary dilatation is apparent. PANCREAS:      There is normal signal intensity. No focal mass or main pancreatic duct dilatation is seen.  There i 2. Development of mild hepatic steatosis since the prior MRI. Assessment / Plan: This is a 51-year-old gentleman who was diagnosed with left-sided colon cancer with singular metastasis to the liver.   I reviewed the above images in per

## 2019-12-27 RX ORDER — NEOMYCIN SULFATE 500 MG/1
TABLET ORAL
Qty: 6 TABLET | Refills: 0 | Status: ON HOLD | OUTPATIENT
Start: 2019-12-27 | End: 2020-01-09

## 2019-12-27 RX ORDER — METRONIDAZOLE 500 MG/1
TABLET ORAL
Qty: 6 TABLET | Refills: 0 | Status: ON HOLD | OUTPATIENT
Start: 2019-12-27 | End: 2020-01-09

## 2019-12-30 ENCOUNTER — TELEPHONE (OUTPATIENT)
Dept: SURGERY | Facility: CLINIC | Age: 54
End: 2019-12-30

## 2019-12-30 NOTE — PAT NURSING NOTE
Spoke with Rusty Sorto RN with Dr. Charlie Ferrer, she confirmed that Dr. Alexander Michael portion of this surgery is cancelled, as are all of his pre-op orders for this case.

## 2020-01-01 ENCOUNTER — APPOINTMENT (OUTPATIENT)
Dept: GENERAL RADIOLOGY | Facility: HOSPITAL | Age: 55
DRG: 374 | End: 2020-01-01
Attending: EMERGENCY MEDICINE
Payer: COMMERCIAL

## 2020-01-01 ENCOUNTER — NURSE ONLY (OUTPATIENT)
Dept: HEMATOLOGY/ONCOLOGY | Facility: HOSPITAL | Age: 55
End: 2020-01-01
Attending: INTERNAL MEDICINE
Payer: COMMERCIAL

## 2020-01-01 ENCOUNTER — TELEPHONE (OUTPATIENT)
Dept: SURGERY | Facility: CLINIC | Age: 55
End: 2020-01-01

## 2020-01-01 ENCOUNTER — OFFICE VISIT (OUTPATIENT)
Dept: SURGERY | Facility: CLINIC | Age: 55
End: 2020-01-01
Payer: COMMERCIAL

## 2020-01-01 ENCOUNTER — TELEPHONE (OUTPATIENT)
Dept: HEMATOLOGY/ONCOLOGY | Facility: HOSPITAL | Age: 55
End: 2020-01-01

## 2020-01-01 ENCOUNTER — HOSPITAL ENCOUNTER (OUTPATIENT)
Dept: NUCLEAR MEDICINE | Facility: HOSPITAL | Age: 55
Discharge: HOME | End: 2020-01-01
Attending: INTERNAL MEDICINE
Payer: COMMERCIAL

## 2020-01-01 ENCOUNTER — LAB REQUISITION (OUTPATIENT)
Dept: LAB | Facility: HOSPITAL | Age: 55
End: 2020-01-01
Payer: COMMERCIAL

## 2020-01-01 ENCOUNTER — TELEPHONE (OUTPATIENT)
Dept: FAMILY MEDICINE CLINIC | Facility: CLINIC | Age: 55
End: 2020-01-01

## 2020-01-01 ENCOUNTER — LAB ENCOUNTER (OUTPATIENT)
Dept: LAB | Facility: HOSPITAL | Age: 55
End: 2020-01-01
Attending: SURGERY
Payer: COMMERCIAL

## 2020-01-01 ENCOUNTER — APPOINTMENT (OUTPATIENT)
Dept: CT IMAGING | Facility: HOSPITAL | Age: 55
DRG: 374 | End: 2020-01-01
Attending: HOSPITALIST
Payer: COMMERCIAL

## 2020-01-01 ENCOUNTER — APPOINTMENT (OUTPATIENT)
Dept: CT IMAGING | Facility: HOSPITAL | Age: 55
DRG: 406 | End: 2020-01-01
Attending: SURGERY
Payer: COMMERCIAL

## 2020-01-01 ENCOUNTER — APPOINTMENT (OUTPATIENT)
Dept: ULTRASOUND IMAGING | Facility: HOSPITAL | Age: 55
DRG: 374 | End: 2020-01-01
Attending: INTERNAL MEDICINE
Payer: COMMERCIAL

## 2020-01-01 ENCOUNTER — OFFICE VISIT (OUTPATIENT)
Dept: FAMILY MEDICINE CLINIC | Facility: CLINIC | Age: 55
End: 2020-01-01
Payer: COMMERCIAL

## 2020-01-01 ENCOUNTER — TELEPHONE (OUTPATIENT)
Dept: NEUROLOGY | Facility: CLINIC | Age: 55
End: 2020-01-01

## 2020-01-01 ENCOUNTER — APPOINTMENT (OUTPATIENT)
Dept: CT IMAGING | Facility: HOSPITAL | Age: 55
End: 2020-01-01
Attending: EMERGENCY MEDICINE
Payer: COMMERCIAL

## 2020-01-01 ENCOUNTER — SOCIAL WORK SERVICES (OUTPATIENT)
Dept: HEMATOLOGY/ONCOLOGY | Facility: HOSPITAL | Age: 55
End: 2020-01-01

## 2020-01-01 ENCOUNTER — HOSPITAL ENCOUNTER (EMERGENCY)
Facility: HOSPITAL | Age: 55
Discharge: HOME | End: 2020-01-01
Attending: EMERGENCY MEDICINE
Payer: COMMERCIAL

## 2020-01-01 ENCOUNTER — OFFICE VISIT (OUTPATIENT)
Dept: NEUROLOGY | Facility: CLINIC | Age: 55
End: 2020-01-01
Payer: COMMERCIAL

## 2020-01-01 ENCOUNTER — APPOINTMENT (OUTPATIENT)
Dept: GENERAL RADIOLOGY | Facility: HOSPITAL | Age: 55
DRG: 374 | End: 2020-01-01
Attending: HOSPITALIST
Payer: COMMERCIAL

## 2020-01-01 ENCOUNTER — DOCUMENTATION ONLY (OUTPATIENT)
Dept: SURGERY | Facility: CLINIC | Age: 55
End: 2020-01-01

## 2020-01-01 ENCOUNTER — ANESTHESIA (OUTPATIENT)
Dept: SURGERY | Facility: HOSPITAL | Age: 55
DRG: 406 | End: 2020-01-01
Payer: COMMERCIAL

## 2020-01-01 ENCOUNTER — HOSPITAL ENCOUNTER (OUTPATIENT)
Dept: CT IMAGING | Facility: HOSPITAL | Age: 55
Discharge: HOME | End: 2020-01-01
Attending: INTERNAL MEDICINE
Payer: COMMERCIAL

## 2020-01-01 ENCOUNTER — ANESTHESIA EVENT (OUTPATIENT)
Dept: SURGERY | Facility: HOSPITAL | Age: 55
DRG: 406 | End: 2020-01-01
Payer: COMMERCIAL

## 2020-01-01 ENCOUNTER — HOSPITAL ENCOUNTER (INPATIENT)
Facility: HOSPITAL | Age: 55
LOS: 9 days | Discharge: HOSPICE | DRG: 374 | End: 2020-01-01
Attending: EMERGENCY MEDICINE | Admitting: HOSPITALIST
Payer: COMMERCIAL

## 2020-01-01 ENCOUNTER — APPOINTMENT (OUTPATIENT)
Dept: HEMATOLOGY/ONCOLOGY | Facility: HOSPITAL | Age: 55
End: 2020-01-01
Attending: NURSE PRACTITIONER
Payer: COMMERCIAL

## 2020-01-01 ENCOUNTER — PATIENT MESSAGE (OUTPATIENT)
Dept: HEMATOLOGY/ONCOLOGY | Facility: HOSPITAL | Age: 55
End: 2020-01-01

## 2020-01-01 ENCOUNTER — HOSPITAL ENCOUNTER (INPATIENT)
Facility: HOSPITAL | Age: 55
LOS: 5 days | Discharge: HOME | DRG: 406 | End: 2020-01-01
Attending: SURGERY | Admitting: SURGERY
Payer: COMMERCIAL

## 2020-01-01 ENCOUNTER — PATIENT MESSAGE (OUTPATIENT)
Dept: SURGERY | Facility: CLINIC | Age: 55
End: 2020-01-01

## 2020-01-01 ENCOUNTER — TELEPHONE (OUTPATIENT)
Dept: GASTROENTEROLOGY | Facility: CLINIC | Age: 55
End: 2020-01-01

## 2020-01-01 VITALS
SYSTOLIC BLOOD PRESSURE: 143 MMHG | HEIGHT: 74 IN | OXYGEN SATURATION: 96 % | HEART RATE: 91 BPM | TEMPERATURE: 98 F | RESPIRATION RATE: 21 BRPM | WEIGHT: 213 LBS | BODY MASS INDEX: 27.34 KG/M2 | DIASTOLIC BLOOD PRESSURE: 87 MMHG

## 2020-01-01 VITALS
DIASTOLIC BLOOD PRESSURE: 87 MMHG | TEMPERATURE: 99 F | OXYGEN SATURATION: 99 % | WEIGHT: 251 LBS | HEIGHT: 74 IN | RESPIRATION RATE: 18 BRPM | SYSTOLIC BLOOD PRESSURE: 145 MMHG | HEART RATE: 93 BPM | BODY MASS INDEX: 32.21 KG/M2

## 2020-01-01 VITALS
HEIGHT: 74 IN | WEIGHT: 230 LBS | SYSTOLIC BLOOD PRESSURE: 150 MMHG | BODY MASS INDEX: 29.52 KG/M2 | HEART RATE: 100 BPM | RESPIRATION RATE: 16 BRPM | DIASTOLIC BLOOD PRESSURE: 85 MMHG | TEMPERATURE: 99 F | OXYGEN SATURATION: 97 %

## 2020-01-01 VITALS
TEMPERATURE: 99 F | BODY MASS INDEX: 29.6 KG/M2 | HEART RATE: 100 BPM | RESPIRATION RATE: 16 BRPM | OXYGEN SATURATION: 97 % | HEIGHT: 74.02 IN | WEIGHT: 230.63 LBS | DIASTOLIC BLOOD PRESSURE: 85 MMHG | SYSTOLIC BLOOD PRESSURE: 150 MMHG

## 2020-01-01 VITALS
BODY MASS INDEX: 30.16 KG/M2 | HEIGHT: 74 IN | TEMPERATURE: 98 F | HEART RATE: 85 BPM | DIASTOLIC BLOOD PRESSURE: 78 MMHG | RESPIRATION RATE: 18 BRPM | WEIGHT: 235 LBS | SYSTOLIC BLOOD PRESSURE: 138 MMHG | OXYGEN SATURATION: 98 %

## 2020-01-01 VITALS
RESPIRATION RATE: 16 BRPM | DIASTOLIC BLOOD PRESSURE: 84 MMHG | BODY MASS INDEX: 33 KG/M2 | WEIGHT: 260.81 LBS | SYSTOLIC BLOOD PRESSURE: 129 MMHG | HEART RATE: 82 BPM | OXYGEN SATURATION: 98 %

## 2020-01-01 VITALS
HEIGHT: 74 IN | DIASTOLIC BLOOD PRESSURE: 87 MMHG | RESPIRATION RATE: 18 BRPM | HEART RATE: 78 BPM | WEIGHT: 245 LBS | SYSTOLIC BLOOD PRESSURE: 154 MMHG | TEMPERATURE: 98 F | BODY MASS INDEX: 31.44 KG/M2 | OXYGEN SATURATION: 99 %

## 2020-01-01 VITALS
OXYGEN SATURATION: 94 % | HEIGHT: 74 IN | BODY MASS INDEX: 33.37 KG/M2 | HEART RATE: 101 BPM | SYSTOLIC BLOOD PRESSURE: 135 MMHG | WEIGHT: 260 LBS | DIASTOLIC BLOOD PRESSURE: 89 MMHG | RESPIRATION RATE: 16 BRPM | TEMPERATURE: 99 F

## 2020-01-01 VITALS
HEIGHT: 74 IN | TEMPERATURE: 99 F | HEART RATE: 75 BPM | RESPIRATION RATE: 18 BRPM | SYSTOLIC BLOOD PRESSURE: 146 MMHG | BODY MASS INDEX: 30.93 KG/M2 | WEIGHT: 241 LBS | OXYGEN SATURATION: 99 % | DIASTOLIC BLOOD PRESSURE: 97 MMHG

## 2020-01-01 VITALS — BODY MASS INDEX: 32.08 KG/M2 | WEIGHT: 250 LBS | HEIGHT: 74 IN

## 2020-01-01 VITALS
DIASTOLIC BLOOD PRESSURE: 79 MMHG | HEART RATE: 70 BPM | TEMPERATURE: 98 F | OXYGEN SATURATION: 98 % | SYSTOLIC BLOOD PRESSURE: 135 MMHG | RESPIRATION RATE: 16 BRPM

## 2020-01-01 VITALS
SYSTOLIC BLOOD PRESSURE: 144 MMHG | RESPIRATION RATE: 18 BRPM | OXYGEN SATURATION: 99 % | BODY MASS INDEX: 32.21 KG/M2 | HEART RATE: 85 BPM | HEIGHT: 74 IN | WEIGHT: 251 LBS | TEMPERATURE: 98 F | DIASTOLIC BLOOD PRESSURE: 86 MMHG

## 2020-01-01 VITALS
BODY MASS INDEX: 32.47 KG/M2 | RESPIRATION RATE: 16 BRPM | HEIGHT: 74 IN | TEMPERATURE: 98 F | SYSTOLIC BLOOD PRESSURE: 125 MMHG | OXYGEN SATURATION: 98 % | DIASTOLIC BLOOD PRESSURE: 70 MMHG | HEART RATE: 70 BPM | WEIGHT: 253 LBS

## 2020-01-01 VITALS
BODY MASS INDEX: 32.08 KG/M2 | TEMPERATURE: 99 F | OXYGEN SATURATION: 99 % | HEART RATE: 76 BPM | DIASTOLIC BLOOD PRESSURE: 82 MMHG | RESPIRATION RATE: 16 BRPM | HEIGHT: 74 IN | WEIGHT: 250 LBS | SYSTOLIC BLOOD PRESSURE: 139 MMHG

## 2020-01-01 VITALS
DIASTOLIC BLOOD PRESSURE: 82 MMHG | SYSTOLIC BLOOD PRESSURE: 149 MMHG | WEIGHT: 257 LBS | OXYGEN SATURATION: 99 % | RESPIRATION RATE: 16 BRPM | HEART RATE: 68 BPM | BODY MASS INDEX: 32.98 KG/M2 | TEMPERATURE: 98 F | HEIGHT: 74 IN

## 2020-01-01 VITALS
TEMPERATURE: 98 F | HEIGHT: 73 IN | SYSTOLIC BLOOD PRESSURE: 139 MMHG | HEART RATE: 71 BPM | DIASTOLIC BLOOD PRESSURE: 101 MMHG | WEIGHT: 252 LBS | OXYGEN SATURATION: 99 % | RESPIRATION RATE: 16 BRPM | BODY MASS INDEX: 33.4 KG/M2

## 2020-01-01 VITALS
TEMPERATURE: 99 F | OXYGEN SATURATION: 96 % | SYSTOLIC BLOOD PRESSURE: 162 MMHG | RESPIRATION RATE: 16 BRPM | DIASTOLIC BLOOD PRESSURE: 88 MMHG | HEART RATE: 82 BPM

## 2020-01-01 VITALS
HEIGHT: 74 IN | SYSTOLIC BLOOD PRESSURE: 154 MMHG | BODY MASS INDEX: 35.04 KG/M2 | DIASTOLIC BLOOD PRESSURE: 94 MMHG | OXYGEN SATURATION: 96 % | HEART RATE: 104 BPM | RESPIRATION RATE: 24 BRPM | WEIGHT: 273.06 LBS | TEMPERATURE: 97 F

## 2020-01-01 VITALS
BODY MASS INDEX: 37 KG/M2 | WEIGHT: 285 LBS | OXYGEN SATURATION: 99 % | RESPIRATION RATE: 16 BRPM | DIASTOLIC BLOOD PRESSURE: 95 MMHG | SYSTOLIC BLOOD PRESSURE: 163 MMHG | HEART RATE: 81 BPM | TEMPERATURE: 98 F

## 2020-01-01 VITALS
HEART RATE: 76 BPM | SYSTOLIC BLOOD PRESSURE: 150 MMHG | BODY MASS INDEX: 34.91 KG/M2 | DIASTOLIC BLOOD PRESSURE: 86 MMHG | RESPIRATION RATE: 18 BRPM | WEIGHT: 272 LBS | HEIGHT: 74 IN

## 2020-01-01 VITALS
SYSTOLIC BLOOD PRESSURE: 137 MMHG | HEIGHT: 74 IN | DIASTOLIC BLOOD PRESSURE: 85 MMHG | TEMPERATURE: 99 F | RESPIRATION RATE: 18 BRPM | BODY MASS INDEX: 31.95 KG/M2 | WEIGHT: 249 LBS | HEART RATE: 76 BPM | OXYGEN SATURATION: 99 %

## 2020-01-01 VITALS
SYSTOLIC BLOOD PRESSURE: 130 MMHG | OXYGEN SATURATION: 95 % | HEART RATE: 90 BPM | TEMPERATURE: 98 F | WEIGHT: 261.81 LBS | DIASTOLIC BLOOD PRESSURE: 85 MMHG | BODY MASS INDEX: 34 KG/M2 | RESPIRATION RATE: 16 BRPM

## 2020-01-01 VITALS
TEMPERATURE: 99 F | WEIGHT: 282 LBS | HEIGHT: 74 IN | RESPIRATION RATE: 18 BRPM | BODY MASS INDEX: 36.19 KG/M2 | DIASTOLIC BLOOD PRESSURE: 82 MMHG | SYSTOLIC BLOOD PRESSURE: 152 MMHG | HEART RATE: 78 BPM | OXYGEN SATURATION: 99 %

## 2020-01-01 VITALS
WEIGHT: 252.81 LBS | RESPIRATION RATE: 18 BRPM | DIASTOLIC BLOOD PRESSURE: 90 MMHG | SYSTOLIC BLOOD PRESSURE: 157 MMHG | OXYGEN SATURATION: 99 % | HEART RATE: 79 BPM | BODY MASS INDEX: 32 KG/M2

## 2020-01-01 VITALS
OXYGEN SATURATION: 98 % | TEMPERATURE: 98 F | RESPIRATION RATE: 18 BRPM | WEIGHT: 235 LBS | DIASTOLIC BLOOD PRESSURE: 78 MMHG | SYSTOLIC BLOOD PRESSURE: 138 MMHG | BODY MASS INDEX: 30.16 KG/M2 | HEIGHT: 74 IN | HEART RATE: 85 BPM

## 2020-01-01 VITALS
DIASTOLIC BLOOD PRESSURE: 82 MMHG | OXYGEN SATURATION: 93 % | SYSTOLIC BLOOD PRESSURE: 148 MMHG | HEART RATE: 87 BPM | TEMPERATURE: 99 F | RESPIRATION RATE: 16 BRPM

## 2020-01-01 VITALS
HEART RATE: 76 BPM | BODY MASS INDEX: 32.47 KG/M2 | RESPIRATION RATE: 16 BRPM | WEIGHT: 253 LBS | HEIGHT: 74 IN | DIASTOLIC BLOOD PRESSURE: 81 MMHG | OXYGEN SATURATION: 99 % | SYSTOLIC BLOOD PRESSURE: 142 MMHG | TEMPERATURE: 98 F

## 2020-01-01 VITALS
HEART RATE: 74 BPM | HEIGHT: 74 IN | WEIGHT: 252 LBS | BODY MASS INDEX: 32.34 KG/M2 | RESPIRATION RATE: 16 BRPM | SYSTOLIC BLOOD PRESSURE: 146 MMHG | DIASTOLIC BLOOD PRESSURE: 96 MMHG

## 2020-01-01 VITALS
OXYGEN SATURATION: 98 % | TEMPERATURE: 98 F | DIASTOLIC BLOOD PRESSURE: 77 MMHG | RESPIRATION RATE: 16 BRPM | SYSTOLIC BLOOD PRESSURE: 142 MMHG | HEART RATE: 68 BPM

## 2020-01-01 VITALS
SYSTOLIC BLOOD PRESSURE: 152 MMHG | OXYGEN SATURATION: 100 % | DIASTOLIC BLOOD PRESSURE: 84 MMHG | RESPIRATION RATE: 20 BRPM | BODY MASS INDEX: 32 KG/M2 | HEART RATE: 79 BPM | TEMPERATURE: 97 F | WEIGHT: 251 LBS

## 2020-01-01 VITALS
TEMPERATURE: 98 F | BODY MASS INDEX: 36 KG/M2 | SYSTOLIC BLOOD PRESSURE: 162 MMHG | HEART RATE: 79 BPM | WEIGHT: 277.38 LBS | RESPIRATION RATE: 16 BRPM | OXYGEN SATURATION: 99 % | DIASTOLIC BLOOD PRESSURE: 104 MMHG

## 2020-01-01 VITALS
SYSTOLIC BLOOD PRESSURE: 160 MMHG | HEART RATE: 76 BPM | BODY MASS INDEX: 32.98 KG/M2 | HEIGHT: 74 IN | RESPIRATION RATE: 18 BRPM | TEMPERATURE: 99 F | DIASTOLIC BLOOD PRESSURE: 83 MMHG | OXYGEN SATURATION: 98 % | WEIGHT: 257 LBS

## 2020-01-01 VITALS
TEMPERATURE: 98 F | SYSTOLIC BLOOD PRESSURE: 156 MMHG | DIASTOLIC BLOOD PRESSURE: 87 MMHG | BODY MASS INDEX: 36.06 KG/M2 | WEIGHT: 281 LBS | RESPIRATION RATE: 18 BRPM | HEART RATE: 75 BPM | HEIGHT: 74 IN

## 2020-01-01 VITALS — HEART RATE: 63 BPM | SYSTOLIC BLOOD PRESSURE: 129 MMHG | DIASTOLIC BLOOD PRESSURE: 85 MMHG

## 2020-01-01 VITALS
WEIGHT: 230 LBS | TEMPERATURE: 99 F | DIASTOLIC BLOOD PRESSURE: 102 MMHG | OXYGEN SATURATION: 96 % | HEART RATE: 101 BPM | BODY MASS INDEX: 30 KG/M2 | SYSTOLIC BLOOD PRESSURE: 164 MMHG | RESPIRATION RATE: 18 BRPM

## 2020-01-01 DIAGNOSIS — C78.7 SECONDARY MALIGNANT NEOPLASM OF LIVER AND INTRAHEPATIC BILE DUCT (HCC): ICD-10-CM

## 2020-01-01 DIAGNOSIS — T45.1X5A PERIPHERAL NEUROPATHY DUE TO CHEMOTHERAPY (HCC): ICD-10-CM

## 2020-01-01 DIAGNOSIS — T40.2X5A THERAPEUTIC OPIOID INDUCED CONSTIPATION: ICD-10-CM

## 2020-01-01 DIAGNOSIS — C18.6 MALIGNANT NEOPLASM OF DESCENDING COLON (HCC): Primary | ICD-10-CM

## 2020-01-01 DIAGNOSIS — C18.9 COLON CANCER METASTASIZED TO LIVER (HCC): ICD-10-CM

## 2020-01-01 DIAGNOSIS — C78.7 COLON CANCER METASTASIZED TO LIVER (HCC): ICD-10-CM

## 2020-01-01 DIAGNOSIS — Z51.11 CHEMOTHERAPY MANAGEMENT, ENCOUNTER FOR: ICD-10-CM

## 2020-01-01 DIAGNOSIS — R25.2 MUSCLE CRAMPS: Primary | ICD-10-CM

## 2020-01-01 DIAGNOSIS — Z51.11 ENCOUNTER FOR CHEMOTHERAPY MANAGEMENT: ICD-10-CM

## 2020-01-01 DIAGNOSIS — C18.9 ADENOCARCINOMA OF COLON METASTATIC TO LIVER (HCC): ICD-10-CM

## 2020-01-01 DIAGNOSIS — K59.00 CONSTIPATION, UNSPECIFIED CONSTIPATION TYPE: Primary | ICD-10-CM

## 2020-01-01 DIAGNOSIS — R20.0 NUMBNESS AND TINGLING: ICD-10-CM

## 2020-01-01 DIAGNOSIS — T45.1X5A PERIPHERAL NEUROPATHY DUE TO CHEMOTHERAPY (HCC): Primary | ICD-10-CM

## 2020-01-01 DIAGNOSIS — G62.0 PERIPHERAL NEUROPATHY DUE TO CHEMOTHERAPY (HCC): ICD-10-CM

## 2020-01-01 DIAGNOSIS — G62.0 CHEMOTHERAPY-INDUCED NEUROPATHY (HCC): Primary | ICD-10-CM

## 2020-01-01 DIAGNOSIS — K59.00 CONSTIPATION, UNSPECIFIED CONSTIPATION TYPE: ICD-10-CM

## 2020-01-01 DIAGNOSIS — C78.7 LIVER METASTASIS (HCC): ICD-10-CM

## 2020-01-01 DIAGNOSIS — G89.3 CANCER RELATED PAIN: Primary | ICD-10-CM

## 2020-01-01 DIAGNOSIS — C18.9 COLON CANCER (HCC): ICD-10-CM

## 2020-01-01 DIAGNOSIS — R20.2 NUMBNESS AND TINGLING: ICD-10-CM

## 2020-01-01 DIAGNOSIS — K59.03 THERAPEUTIC OPIOID INDUCED CONSTIPATION: ICD-10-CM

## 2020-01-01 DIAGNOSIS — C18.9 COLON CANCER METASTASIZED TO LIVER (HCC): Primary | ICD-10-CM

## 2020-01-01 DIAGNOSIS — G62.0 PERIPHERAL NEUROPATHY DUE TO CHEMOTHERAPY (HCC): Primary | ICD-10-CM

## 2020-01-01 DIAGNOSIS — R10.9 ABDOMINAL PAIN OF UNKNOWN ETIOLOGY: Primary | ICD-10-CM

## 2020-01-01 DIAGNOSIS — K59.09 OTHER CONSTIPATION: ICD-10-CM

## 2020-01-01 DIAGNOSIS — C78.7 ADENOCARCINOMA OF COLON METASTATIC TO LIVER (HCC): ICD-10-CM

## 2020-01-01 DIAGNOSIS — Z08 ENCOUNTER FOR FOLLOW-UP SURVEILLANCE OF COLON CANCER: ICD-10-CM

## 2020-01-01 DIAGNOSIS — C18.9 MALIGNANT NEOPLASM OF COLON, UNSPECIFIED (HCC): ICD-10-CM

## 2020-01-01 DIAGNOSIS — T45.1X5A CHEMOTHERAPY-INDUCED NEUROPATHY (HCC): Primary | ICD-10-CM

## 2020-01-01 DIAGNOSIS — R10.9 ABDOMINAL PAIN OF UNKNOWN ETIOLOGY: ICD-10-CM

## 2020-01-01 DIAGNOSIS — Z85.038 ENCOUNTER FOR FOLLOW-UP SURVEILLANCE OF COLON CANCER: ICD-10-CM

## 2020-01-01 DIAGNOSIS — C78.7 COLON CANCER METASTASIZED TO LIVER (HCC): Primary | ICD-10-CM

## 2020-01-01 DIAGNOSIS — C18.6 MALIGNANT NEOPLASM OF DESCENDING COLON (HCC): ICD-10-CM

## 2020-01-01 DIAGNOSIS — R11.2 CHEMOTHERAPY INDUCED NAUSEA AND VOMITING: ICD-10-CM

## 2020-01-01 DIAGNOSIS — Z71.89 GOALS OF CARE, COUNSELING/DISCUSSION: ICD-10-CM

## 2020-01-01 DIAGNOSIS — R63.4 WEIGHT LOSS: ICD-10-CM

## 2020-01-01 DIAGNOSIS — R93.3 ABNORMAL CT SCAN, GASTROINTESTINAL TRACT: ICD-10-CM

## 2020-01-01 DIAGNOSIS — Z71.89 ADVANCE CARE PLANNING: ICD-10-CM

## 2020-01-01 DIAGNOSIS — G89.3 CANCER RELATED PAIN: ICD-10-CM

## 2020-01-01 DIAGNOSIS — E87.6 HYPOKALEMIA: ICD-10-CM

## 2020-01-01 DIAGNOSIS — K66.8 OMENTAL MASS: ICD-10-CM

## 2020-01-01 DIAGNOSIS — Z01.818 PREOP TESTING: Primary | ICD-10-CM

## 2020-01-01 DIAGNOSIS — R52 INTRACTABLE PAIN: ICD-10-CM

## 2020-01-01 DIAGNOSIS — Z51.81 MEDICATION MONITORING ENCOUNTER: ICD-10-CM

## 2020-01-01 DIAGNOSIS — G62.0 CHEMOTHERAPY-INDUCED NEUROPATHY (HCC): ICD-10-CM

## 2020-01-01 DIAGNOSIS — I10 ESSENTIAL HYPERTENSION: ICD-10-CM

## 2020-01-01 DIAGNOSIS — R11.2 NAUSEA AND VOMITING IN ADULT: ICD-10-CM

## 2020-01-01 DIAGNOSIS — C18.9 METASTATIC COLON CANCER TO LIVER (HCC): Primary | ICD-10-CM

## 2020-01-01 DIAGNOSIS — Z01.818 PREOP TESTING: ICD-10-CM

## 2020-01-01 DIAGNOSIS — T45.1X5A CHEMOTHERAPY-INDUCED NEUROPATHY (HCC): ICD-10-CM

## 2020-01-01 DIAGNOSIS — R63.0 DECREASED APPETITE: ICD-10-CM

## 2020-01-01 DIAGNOSIS — R11.2 NON-INTRACTABLE VOMITING WITH NAUSEA, UNSPECIFIED VOMITING TYPE: Primary | ICD-10-CM

## 2020-01-01 DIAGNOSIS — Z09 POSTOP CHECK: Primary | ICD-10-CM

## 2020-01-01 DIAGNOSIS — R10.9 ABDOMINAL DISCOMFORT: ICD-10-CM

## 2020-01-01 DIAGNOSIS — C18.9 COLON CANCER (HCC): Primary | ICD-10-CM

## 2020-01-01 DIAGNOSIS — C78.7 LIVER METASTASIS (HCC): Primary | ICD-10-CM

## 2020-01-01 DIAGNOSIS — I10 ESSENTIAL HYPERTENSION: Primary | ICD-10-CM

## 2020-01-01 DIAGNOSIS — C78.7 METASTATIC COLON CANCER TO LIVER (HCC): Primary | ICD-10-CM

## 2020-01-01 DIAGNOSIS — M54.50 DORSALGIA OF LUMBAR REGION: ICD-10-CM

## 2020-01-01 DIAGNOSIS — T45.1X5A CHEMOTHERAPY INDUCED NAUSEA AND VOMITING: ICD-10-CM

## 2020-01-01 LAB
ALBUMIN SERPL-MCNC: 2.3 G/DL (ref 3.4–5)
ALBUMIN SERPL-MCNC: 2.3 G/DL (ref 3.4–5)
ALBUMIN SERPL-MCNC: 2.5 G/DL (ref 3.4–5)
ALBUMIN SERPL-MCNC: 2.6 G/DL (ref 3.4–5)
ALBUMIN SERPL-MCNC: 2.8 G/DL (ref 3.4–5)
ALBUMIN SERPL-MCNC: 3.4 G/DL (ref 3.4–5)
ALBUMIN SERPL-MCNC: 3.6 G/DL (ref 3.4–5)
ALBUMIN SERPL-MCNC: 3.6 G/DL (ref 3.4–5)
ALBUMIN/GLOB SERPL: 0.4 {RATIO} (ref 1–2)
ALBUMIN/GLOB SERPL: 0.8 {RATIO} (ref 1–2)
ALBUMIN/GLOB SERPL: 0.9 {RATIO} (ref 1–2)
ALP LIVER SERPL-CCNC: 102 U/L (ref 45–117)
ALP LIVER SERPL-CCNC: 105 U/L (ref 45–117)
ALP LIVER SERPL-CCNC: 106 U/L (ref 45–117)
ALP LIVER SERPL-CCNC: 108 U/L (ref 45–117)
ALP LIVER SERPL-CCNC: 111 U/L (ref 45–117)
ALP LIVER SERPL-CCNC: 122 U/L
ALP LIVER SERPL-CCNC: 161 U/L
ALP LIVER SERPL-CCNC: 187 U/L
ALP LIVER SERPL-CCNC: 192 U/L
ALP LIVER SERPL-CCNC: 214 U/L
ALP LIVER SERPL-CCNC: 83 U/L (ref 45–117)
ALT SERPL-CCNC: 120 U/L
ALT SERPL-CCNC: 129 U/L
ALT SERPL-CCNC: 129 U/L
ALT SERPL-CCNC: 151 U/L
ALT SERPL-CCNC: 161 U/L
ALT SERPL-CCNC: 19 U/L (ref 16–61)
ALT SERPL-CCNC: 19 U/L (ref 16–61)
ALT SERPL-CCNC: 22 U/L (ref 16–61)
ALT SERPL-CCNC: 24 U/L (ref 16–61)
ALT SERPL-CCNC: 28 U/L (ref 16–61)
ALT SERPL-CCNC: 28 U/L (ref 16–61)
ANION GAP SERPL CALC-SCNC: 1 MMOL/L (ref 0–18)
ANION GAP SERPL CALC-SCNC: 3 MMOL/L (ref 0–18)
ANION GAP SERPL CALC-SCNC: 3 MMOL/L (ref 0–18)
ANION GAP SERPL CALC-SCNC: 4 MMOL/L (ref 0–18)
ANION GAP SERPL CALC-SCNC: 4 MMOL/L (ref 0–18)
ANION GAP SERPL CALC-SCNC: 5 MMOL/L (ref 0–18)
ANION GAP SERPL CALC-SCNC: 6 MMOL/L (ref 0–18)
ANION GAP SERPL CALC-SCNC: 6 MMOL/L (ref 0–18)
ANION GAP SERPL CALC-SCNC: 7 MMOL/L (ref 0–18)
AST SERPL-CCNC: 101 U/L (ref 15–37)
AST SERPL-CCNC: 105 U/L (ref 15–37)
AST SERPL-CCNC: 11 U/L (ref 15–37)
AST SERPL-CCNC: 118 U/L (ref 15–37)
AST SERPL-CCNC: 13 U/L (ref 15–37)
AST SERPL-CCNC: 13 U/L (ref 15–37)
AST SERPL-CCNC: 17 U/L (ref 15–37)
AST SERPL-CCNC: 17 U/L (ref 15–37)
AST SERPL-CCNC: 18 U/L (ref 15–37)
AST SERPL-CCNC: 82 U/L (ref 15–37)
AST SERPL-CCNC: 94 U/L (ref 15–37)
BACTERIA UR QL AUTO: NEGATIVE /HPF
BASOPHILS # BLD AUTO: 0.02 X10(3) UL (ref 0–0.2)
BASOPHILS # BLD AUTO: 0.03 X10(3) UL (ref 0–0.2)
BASOPHILS # BLD AUTO: 0.04 X10(3) UL (ref 0–0.2)
BASOPHILS # BLD AUTO: 0.04 X10(3) UL (ref 0–0.2)
BASOPHILS NFR BLD AUTO: 0.3 %
BASOPHILS NFR BLD AUTO: 0.4 %
BASOPHILS NFR BLD AUTO: 0.4 %
BASOPHILS NFR BLD AUTO: 0.5 %
BASOPHILS NFR BLD AUTO: 0.6 %
BASOPHILS NFR BLD AUTO: 0.7 %
BILIRUB DIRECT SERPL-MCNC: 0.2 MG/DL (ref 0–0.2)
BILIRUB DIRECT SERPL-MCNC: 0.5 MG/DL (ref 0–0.2)
BILIRUB SERPL-MCNC: 0.1 MG/DL (ref 0.1–2)
BILIRUB SERPL-MCNC: 0.2 MG/DL (ref 0.1–2)
BILIRUB SERPL-MCNC: 0.3 MG/DL (ref 0.1–2)
BILIRUB SERPL-MCNC: 0.3 MG/DL (ref 0.1–2)
BILIRUB SERPL-MCNC: 0.5 MG/DL (ref 0.1–2)
BILIRUB SERPL-MCNC: 0.5 MG/DL (ref 0.1–2)
BILIRUB SERPL-MCNC: 0.9 MG/DL (ref 0.1–2)
BILIRUB SERPL-MCNC: 1 MG/DL (ref 0.1–2)
BILIRUB SERPL-MCNC: 1.1 MG/DL (ref 0.1–2)
BILIRUB UR QL: NEGATIVE
BUN BLD-MCNC: 12 MG/DL (ref 7–18)
BUN BLD-MCNC: 14 MG/DL (ref 7–18)
BUN BLD-MCNC: 14 MG/DL (ref 7–18)
BUN BLD-MCNC: 15 MG/DL (ref 7–18)
BUN BLD-MCNC: 15 MG/DL (ref 7–18)
BUN BLD-MCNC: 17 MG/DL (ref 7–18)
BUN BLD-MCNC: 17 MG/DL (ref 7–18)
BUN BLD-MCNC: 18 MG/DL (ref 7–18)
BUN BLD-MCNC: 19 MG/DL (ref 7–18)
BUN BLD-MCNC: 20 MG/DL (ref 7–18)
BUN BLD-MCNC: 21 MG/DL (ref 7–18)
BUN BLD-MCNC: 9 MG/DL (ref 7–18)
BUN/CREAT SERPL: 11.4 (ref 10–20)
BUN/CREAT SERPL: 12.8 (ref 10–20)
BUN/CREAT SERPL: 13.7 (ref 10–20)
BUN/CREAT SERPL: 14 (ref 10–20)
BUN/CREAT SERPL: 14.4 (ref 10–20)
BUN/CREAT SERPL: 16 (ref 10–20)
BUN/CREAT SERPL: 16.9 (ref 10–20)
BUN/CREAT SERPL: 17 (ref 10–20)
BUN/CREAT SERPL: 20.2 (ref 10–20)
BUN/CREAT SERPL: 20.9 (ref 10–20)
BUN/CREAT SERPL: 22 (ref 10–20)
BUN/CREAT SERPL: 24.4 (ref 10–20)
BUN/CREAT SERPL: 24.4 (ref 10–20)
BUN/CREAT SERPL: 7.3 (ref 10–20)
BUN/CREAT SERPL: 9.4 (ref 10–20)
CALCIUM BLD-MCNC: 8.7 MG/DL (ref 8.5–10.1)
CALCIUM BLD-MCNC: 8.8 MG/DL (ref 8.5–10.1)
CALCIUM BLD-MCNC: 8.9 MG/DL (ref 8.5–10.1)
CALCIUM BLD-MCNC: 9 MG/DL (ref 8.5–10.1)
CALCIUM BLD-MCNC: 9.1 MG/DL (ref 8.5–10.1)
CALCIUM BLD-MCNC: 9.2 MG/DL (ref 8.5–10.1)
CALCIUM BLD-MCNC: 9.3 MG/DL (ref 8.5–10.1)
CALCIUM BLD-MCNC: 9.6 MG/DL (ref 8.5–10.1)
CEA SERPL-MCNC: 3.7 NG/ML (ref ?–5)
CHLORIDE SERPL-SCNC: 100 MMOL/L (ref 98–112)
CHLORIDE SERPL-SCNC: 101 MMOL/L (ref 98–112)
CHLORIDE SERPL-SCNC: 102 MMOL/L (ref 98–112)
CHLORIDE SERPL-SCNC: 103 MMOL/L (ref 98–112)
CHLORIDE SERPL-SCNC: 104 MMOL/L (ref 98–112)
CHLORIDE SERPL-SCNC: 105 MMOL/L (ref 98–112)
CHLORIDE SERPL-SCNC: 105 MMOL/L (ref 98–112)
CHLORIDE SERPL-SCNC: 107 MMOL/L (ref 98–112)
CHLORIDE SERPL-SCNC: 108 MMOL/L (ref 98–112)
CHLORIDE SERPL-SCNC: 108 MMOL/L (ref 98–112)
CHLORIDE SERPL-SCNC: 110 MMOL/L (ref 98–112)
CHLORIDE SERPL-SCNC: 99 MMOL/L (ref 98–112)
CLARITY UR: CLEAR
CO2 SERPL-SCNC: 24 MMOL/L (ref 21–32)
CO2 SERPL-SCNC: 25 MMOL/L (ref 21–32)
CO2 SERPL-SCNC: 26 MMOL/L (ref 21–32)
CO2 SERPL-SCNC: 26 MMOL/L (ref 21–32)
CO2 SERPL-SCNC: 27 MMOL/L (ref 21–32)
CO2 SERPL-SCNC: 28 MMOL/L (ref 21–32)
CO2 SERPL-SCNC: 29 MMOL/L (ref 21–32)
CO2 SERPL-SCNC: 30 MMOL/L (ref 21–32)
CO2 SERPL-SCNC: 31 MMOL/L (ref 21–32)
CO2 SERPL-SCNC: 31 MMOL/L (ref 21–32)
COLOR UR: YELLOW
CREAT BLD-MCNC: 0.78 MG/DL
CREAT BLD-MCNC: 0.83 MG/DL
CREAT BLD-MCNC: 0.84 MG/DL
CREAT BLD-MCNC: 0.86 MG/DL
CREAT BLD-MCNC: 0.91 MG/DL
CREAT BLD-MCNC: 0.94 MG/DL
CREAT BLD-MCNC: 0.94 MG/DL (ref 0.7–1.3)
CREAT BLD-MCNC: 1 MG/DL (ref 0.7–1.3)
CREAT BLD-MCNC: 1.02 MG/DL
CREAT BLD-MCNC: 1.04 MG/DL (ref 0.7–1.3)
CREAT BLD-MCNC: 1.05 MG/DL (ref 0.7–1.3)
CREAT BLD-MCNC: 1.23 MG/DL (ref 0.7–1.3)
CREAT BLD-MCNC: 1.28 MG/DL (ref 0.7–1.3)
DEPRECATED RDW RBC AUTO: 42.2 FL (ref 35.1–46.3)
DEPRECATED RDW RBC AUTO: 42.5 FL (ref 35.1–46.3)
DEPRECATED RDW RBC AUTO: 43.2 FL (ref 35.1–46.3)
DEPRECATED RDW RBC AUTO: 43.3 FL (ref 35.1–46.3)
DEPRECATED RDW RBC AUTO: 44.6 FL (ref 35.1–46.3)
DEPRECATED RDW RBC AUTO: 44.8 FL (ref 35.1–46.3)
DEPRECATED RDW RBC AUTO: 46.1 FL (ref 35.1–46.3)
DEPRECATED RDW RBC AUTO: 48.8 FL (ref 35.1–46.3)
DEPRECATED RDW RBC AUTO: 49.1 FL (ref 35.1–46.3)
DEPRECATED RDW RBC AUTO: 52.1 FL (ref 35.1–46.3)
EOSINOPHIL # BLD AUTO: 0.01 X10(3) UL (ref 0–0.7)
EOSINOPHIL # BLD AUTO: 0.02 X10(3) UL (ref 0–0.7)
EOSINOPHIL # BLD AUTO: 0.05 X10(3) UL (ref 0–0.7)
EOSINOPHIL # BLD AUTO: 0.07 X10(3) UL (ref 0–0.7)
EOSINOPHIL # BLD AUTO: 0.08 X10(3) UL (ref 0–0.7)
EOSINOPHIL # BLD AUTO: 0.09 X10(3) UL (ref 0–0.7)
EOSINOPHIL NFR BLD AUTO: 0.1 %
EOSINOPHIL NFR BLD AUTO: 0.1 %
EOSINOPHIL NFR BLD AUTO: 0.2 %
EOSINOPHIL NFR BLD AUTO: 0.2 %
EOSINOPHIL NFR BLD AUTO: 1 %
EOSINOPHIL NFR BLD AUTO: 1.2 %
EOSINOPHIL NFR BLD AUTO: 1.3 %
EOSINOPHIL NFR BLD AUTO: 1.4 %
EOSINOPHIL NFR BLD AUTO: 1.6 %
EOSINOPHIL NFR BLD AUTO: 1.7 %
ERYTHROCYTE [DISTWIDTH] IN BLOOD BY AUTOMATED COUNT: 13.2 % (ref 11–15)
ERYTHROCYTE [DISTWIDTH] IN BLOOD BY AUTOMATED COUNT: 13.8 % (ref 11–15)
ERYTHROCYTE [DISTWIDTH] IN BLOOD BY AUTOMATED COUNT: 13.8 % (ref 11–15)
ERYTHROCYTE [DISTWIDTH] IN BLOOD BY AUTOMATED COUNT: 14.1 % (ref 11–15)
ERYTHROCYTE [DISTWIDTH] IN BLOOD BY AUTOMATED COUNT: 14.2 % (ref 11–15)
ERYTHROCYTE [DISTWIDTH] IN BLOOD BY AUTOMATED COUNT: 14.7 % (ref 11–15)
ERYTHROCYTE [DISTWIDTH] IN BLOOD BY AUTOMATED COUNT: 14.9 % (ref 11–15)
ERYTHROCYTE [DISTWIDTH] IN BLOOD BY AUTOMATED COUNT: 15.3 % (ref 11–15)
ERYTHROCYTE [DISTWIDTH] IN BLOOD BY AUTOMATED COUNT: 16.2 % (ref 11–15)
ERYTHROCYTE [DISTWIDTH] IN BLOOD BY AUTOMATED COUNT: 17.1 % (ref 11–15)
GLOBULIN PLAS-MCNC: 3.7 G/DL (ref 2.8–4.4)
GLOBULIN PLAS-MCNC: 3.9 G/DL (ref 2.8–4.4)
GLOBULIN PLAS-MCNC: 4 G/DL (ref 2.8–4.4)
GLOBULIN PLAS-MCNC: 4.1 G/DL (ref 2.8–4.4)
GLOBULIN PLAS-MCNC: 4.2 G/DL (ref 2.8–4.4)
GLOBULIN PLAS-MCNC: 4.3 G/DL (ref 2.8–4.4)
GLOBULIN PLAS-MCNC: 6.5 G/DL (ref 2.8–4.4)
GLOBULIN PLAS-MCNC: 6.6 G/DL (ref 2.8–4.4)
GLOBULIN PLAS-MCNC: 7 G/DL (ref 2.8–4.4)
GLUCOSE BLD-MCNC: 106 MG/DL (ref 70–99)
GLUCOSE BLD-MCNC: 107 MG/DL (ref 70–99)
GLUCOSE BLD-MCNC: 115 MG/DL (ref 70–99)
GLUCOSE BLD-MCNC: 115 MG/DL (ref 70–99)
GLUCOSE BLD-MCNC: 121 MG/DL (ref 70–99)
GLUCOSE BLD-MCNC: 76 MG/DL (ref 70–99)
GLUCOSE BLD-MCNC: 80 MG/DL (ref 70–99)
GLUCOSE BLD-MCNC: 85 MG/DL (ref 70–99)
GLUCOSE BLD-MCNC: 85 MG/DL (ref 70–99)
GLUCOSE BLD-MCNC: 86 MG/DL (ref 70–99)
GLUCOSE BLD-MCNC: 92 MG/DL (ref 70–99)
GLUCOSE BLD-MCNC: 93 MG/DL (ref 70–99)
GLUCOSE BLD-MCNC: 94 MG/DL (ref 70–99)
GLUCOSE BLD-MCNC: 95 MG/DL (ref 70–99)
GLUCOSE BLD-MCNC: 97 MG/DL (ref 70–99)
GLUCOSE BLDC GLUCOMTR-MCNC: 102 MG/DL (ref 70–99)
GLUCOSE BLDC GLUCOMTR-MCNC: 113 MG/DL (ref 70–99)
GLUCOSE BLDC GLUCOMTR-MCNC: 114 MG/DL (ref 70–99)
GLUCOSE BLDC GLUCOMTR-MCNC: 116 MG/DL (ref 70–99)
GLUCOSE BLDC GLUCOMTR-MCNC: 117 MG/DL (ref 70–99)
GLUCOSE BLDC GLUCOMTR-MCNC: 118 MG/DL (ref 70–99)
GLUCOSE BLDC GLUCOMTR-MCNC: 121 MG/DL (ref 70–99)
GLUCOSE BLDC GLUCOMTR-MCNC: 122 MG/DL (ref 70–99)
GLUCOSE BLDC GLUCOMTR-MCNC: 129 MG/DL (ref 70–99)
GLUCOSE BLDC GLUCOMTR-MCNC: 130 MG/DL (ref 70–99)
GLUCOSE BLDC GLUCOMTR-MCNC: 98 MG/DL (ref 70–99)
GLUCOSE UR-MCNC: NEGATIVE MG/DL
HAV IGM SER QL: 1.9 MG/DL (ref 1.6–2.6)
HAV IGM SER QL: 2.1 MG/DL (ref 1.6–2.6)
HAV IGM SER QL: 2.1 MG/DL (ref 1.6–2.6)
HAV IGM SER QL: 2.3 MG/DL (ref 1.6–2.6)
HAV IGM SER QL: 2.5 MG/DL (ref 1.6–2.6)
HAV IGM SER QL: 2.6 MG/DL (ref 1.6–2.6)
HCT VFR BLD AUTO: 34.1 %
HCT VFR BLD AUTO: 34.5 %
HCT VFR BLD AUTO: 36.7 %
HCT VFR BLD AUTO: 38.9 % (ref 39–53)
HCT VFR BLD AUTO: 39.6 % (ref 39–53)
HCT VFR BLD AUTO: 40.8 %
HCT VFR BLD AUTO: 40.8 % (ref 39–53)
HCT VFR BLD AUTO: 41 % (ref 39–53)
HCT VFR BLD AUTO: 42.6 % (ref 39–53)
HCT VFR BLD AUTO: 43 % (ref 39–53)
HGB BLD-MCNC: 10.4 G/DL
HGB BLD-MCNC: 10.5 G/DL
HGB BLD-MCNC: 11.3 G/DL
HGB BLD-MCNC: 12 G/DL (ref 13–17.5)
HGB BLD-MCNC: 12 G/DL (ref 13–17.5)
HGB BLD-MCNC: 12.4 G/DL
HGB BLD-MCNC: 12.6 G/DL (ref 13–17.5)
HGB BLD-MCNC: 12.9 G/DL (ref 13–17.5)
HGB BLD-MCNC: 13.3 G/DL (ref 13–17.5)
HGB BLD-MCNC: 13.4 G/DL (ref 13–17.5)
HGB UR QL STRIP.AUTO: NEGATIVE
IMM GRANULOCYTES # BLD AUTO: 0 X10(3) UL (ref 0–1)
IMM GRANULOCYTES # BLD AUTO: 0.01 X10(3) UL (ref 0–1)
IMM GRANULOCYTES # BLD AUTO: 0.02 X10(3) UL (ref 0–1)
IMM GRANULOCYTES # BLD AUTO: 0.03 X10(3) UL (ref 0–1)
IMM GRANULOCYTES # BLD AUTO: 0.06 X10(3) UL (ref 0–1)
IMM GRANULOCYTES NFR BLD: 0 %
IMM GRANULOCYTES NFR BLD: 0.2 %
IMM GRANULOCYTES NFR BLD: 0.4 %
IMM GRANULOCYTES NFR BLD: 0.5 %
IMM GRANULOCYTES NFR BLD: 0.6 %
INR BLD: 1.15 (ref 0.9–1.2)
KETONES UR-MCNC: NEGATIVE MG/DL
LEUKOCYTE ESTERASE UR QL STRIP.AUTO: NEGATIVE
LIPASE SERPL-CCNC: 140 U/L (ref 73–393)
LYMPHOCYTES # BLD AUTO: 1.5 X10(3) UL (ref 1–4)
LYMPHOCYTES # BLD AUTO: 1.85 X10(3) UL (ref 1–4)
LYMPHOCYTES # BLD AUTO: 2.01 X10(3) UL (ref 1–4)
LYMPHOCYTES # BLD AUTO: 2.15 X10(3) UL (ref 1–4)
LYMPHOCYTES # BLD AUTO: 2.29 X10(3) UL (ref 1–4)
LYMPHOCYTES # BLD AUTO: 2.4 X10(3) UL (ref 1–4)
LYMPHOCYTES # BLD AUTO: 2.58 X10(3) UL (ref 1–4)
LYMPHOCYTES # BLD AUTO: 2.61 X10(3) UL (ref 1–4)
LYMPHOCYTES # BLD AUTO: 2.68 X10(3) UL (ref 1–4)
LYMPHOCYTES # BLD AUTO: 2.8 X10(3) UL (ref 1–4)
LYMPHOCYTES NFR BLD AUTO: 18.8 %
LYMPHOCYTES NFR BLD AUTO: 23.7 %
LYMPHOCYTES NFR BLD AUTO: 23.8 %
LYMPHOCYTES NFR BLD AUTO: 30.7 %
LYMPHOCYTES NFR BLD AUTO: 41 %
LYMPHOCYTES NFR BLD AUTO: 45.3 %
LYMPHOCYTES NFR BLD AUTO: 45.6 %
LYMPHOCYTES NFR BLD AUTO: 46.4 %
LYMPHOCYTES NFR BLD AUTO: 49.1 %
LYMPHOCYTES NFR BLD AUTO: 57.3 %
M PROTEIN MFR SERPL ELPH: 10.2 G/DL (ref 6.4–8.2)
M PROTEIN MFR SERPL ELPH: 7.1 G/DL (ref 6.4–8.2)
M PROTEIN MFR SERPL ELPH: 7.4 G/DL (ref 6.4–8.2)
M PROTEIN MFR SERPL ELPH: 7.5 G/DL (ref 6.4–8.2)
M PROTEIN MFR SERPL ELPH: 7.5 G/DL (ref 6.4–8.2)
M PROTEIN MFR SERPL ELPH: 7.6 G/DL (ref 6.4–8.2)
M PROTEIN MFR SERPL ELPH: 7.9 G/DL (ref 6.4–8.2)
M PROTEIN MFR SERPL ELPH: 8.6 G/DL (ref 6.4–8.2)
M PROTEIN MFR SERPL ELPH: 8.8 G/DL (ref 6.4–8.2)
M PROTEIN MFR SERPL ELPH: 9.1 G/DL (ref 6.4–8.2)
M PROTEIN MFR SERPL ELPH: 9.6 G/DL (ref 6.4–8.2)
MCH RBC QN AUTO: 25.2 PG (ref 26–34)
MCH RBC QN AUTO: 25.6 PG (ref 26–34)
MCH RBC QN AUTO: 25.7 PG (ref 26–34)
MCH RBC QN AUTO: 26 PG (ref 26–34)
MCH RBC QN AUTO: 26.2 PG (ref 26–34)
MCH RBC QN AUTO: 27 PG (ref 26–34)
MCH RBC QN AUTO: 27.1 PG (ref 26–34)
MCH RBC QN AUTO: 27.3 PG (ref 26–34)
MCHC RBC AUTO-ENTMCNC: 30.1 G/DL (ref 31–37)
MCHC RBC AUTO-ENTMCNC: 30.3 G/DL (ref 31–37)
MCHC RBC AUTO-ENTMCNC: 30.4 G/DL (ref 31–37)
MCHC RBC AUTO-ENTMCNC: 30.8 G/DL (ref 31–37)
MCHC RBC AUTO-ENTMCNC: 30.9 G/DL (ref 31–37)
MCHC RBC AUTO-ENTMCNC: 31.2 G/DL (ref 31–37)
MCHC RBC AUTO-ENTMCNC: 31.2 G/DL (ref 31–37)
MCHC RBC AUTO-ENTMCNC: 31.5 G/DL (ref 31–37)
MCV RBC AUTO: 83.3 FL (ref 80–100)
MCV RBC AUTO: 83.4 FL
MCV RBC AUTO: 83.5 FL
MCV RBC AUTO: 83.9 FL (ref 80–100)
MCV RBC AUTO: 84 FL (ref 80–100)
MCV RBC AUTO: 84.1 FL
MCV RBC AUTO: 84.4 FL
MCV RBC AUTO: 87.4 FL (ref 80–100)
MCV RBC AUTO: 88.3 FL (ref 80–100)
MCV RBC AUTO: 89.4 FL (ref 80–100)
MONOCYTES # BLD AUTO: 0.36 X10(3) UL (ref 0.1–1)
MONOCYTES # BLD AUTO: 0.38 X10(3) UL (ref 0.1–1)
MONOCYTES # BLD AUTO: 0.4 X10(3) UL (ref 0.1–1)
MONOCYTES # BLD AUTO: 0.44 X10(3) UL (ref 0.1–1)
MONOCYTES # BLD AUTO: 0.48 X10(3) UL (ref 0.1–1)
MONOCYTES # BLD AUTO: 0.51 X10(3) UL (ref 0.1–1)
MONOCYTES # BLD AUTO: 0.8 X10(3) UL (ref 0.1–1)
MONOCYTES # BLD AUTO: 0.83 X10(3) UL (ref 0.1–1)
MONOCYTES # BLD AUTO: 1.09 X10(3) UL (ref 0.1–1)
MONOCYTES # BLD AUTO: 1.28 X10(3) UL (ref 0.1–1)
MONOCYTES NFR BLD AUTO: 10.6 %
MONOCYTES NFR BLD AUTO: 12.6 %
MONOCYTES NFR BLD AUTO: 12.9 %
MONOCYTES NFR BLD AUTO: 13 %
MONOCYTES NFR BLD AUTO: 7.1 %
MONOCYTES NFR BLD AUTO: 7.2 %
MONOCYTES NFR BLD AUTO: 8.4 %
MONOCYTES NFR BLD AUTO: 8.5 %
NEUTROPHILS # BLD AUTO: 1.49 X10 (3) UL (ref 1.5–7.7)
NEUTROPHILS # BLD AUTO: 1.49 X10(3) UL (ref 1.5–7.7)
NEUTROPHILS # BLD AUTO: 2.23 X10 (3) UL (ref 1.5–7.7)
NEUTROPHILS # BLD AUTO: 2.23 X10(3) UL (ref 1.5–7.7)
NEUTROPHILS # BLD AUTO: 2.26 X10 (3) UL (ref 1.5–7.7)
NEUTROPHILS # BLD AUTO: 2.26 X10(3) UL (ref 1.5–7.7)
NEUTROPHILS # BLD AUTO: 2.51 X10 (3) UL (ref 1.5–7.7)
NEUTROPHILS # BLD AUTO: 2.51 X10(3) UL (ref 1.5–7.7)
NEUTROPHILS # BLD AUTO: 2.56 X10 (3) UL (ref 1.5–7.7)
NEUTROPHILS # BLD AUTO: 2.56 X10(3) UL (ref 1.5–7.7)
NEUTROPHILS # BLD AUTO: 2.61 X10 (3) UL (ref 1.5–7.7)
NEUTROPHILS # BLD AUTO: 2.61 X10(3) UL (ref 1.5–7.7)
NEUTROPHILS # BLD AUTO: 3.96 X10 (3) UL (ref 1.5–7.7)
NEUTROPHILS # BLD AUTO: 3.96 X10(3) UL (ref 1.5–7.7)
NEUTROPHILS # BLD AUTO: 4.5 X10 (3) UL (ref 1.5–7.7)
NEUTROPHILS # BLD AUTO: 4.5 X10(3) UL (ref 1.5–7.7)
NEUTROPHILS # BLD AUTO: 5.26 X10 (3) UL (ref 1.5–7.7)
NEUTROPHILS # BLD AUTO: 5.26 X10(3) UL (ref 1.5–7.7)
NEUTROPHILS # BLD AUTO: 6.61 X10 (3) UL (ref 1.5–7.7)
NEUTROPHILS # BLD AUTO: 6.61 X10(3) UL (ref 1.5–7.7)
NEUTROPHILS NFR BLD AUTO: 31.9 %
NEUTROPHILS NFR BLD AUTO: 41.9 %
NEUTROPHILS NFR BLD AUTO: 43.1 %
NEUTROPHILS NFR BLD AUTO: 44 %
NEUTROPHILS NFR BLD AUTO: 45 %
NEUTROPHILS NFR BLD AUTO: 48.6 %
NEUTROPHILS NFR BLD AUTO: 57.7 %
NEUTROPHILS NFR BLD AUTO: 62.4 %
NEUTROPHILS NFR BLD AUTO: 62.5 %
NEUTROPHILS NFR BLD AUTO: 67.1 %
NITRITE UR QL STRIP.AUTO: NEGATIVE
OSMOLALITY SERPL CALC.SUM OF ELEC: 275 MOSM/KG (ref 275–295)
OSMOLALITY SERPL CALC.SUM OF ELEC: 279 MOSM/KG (ref 275–295)
OSMOLALITY SERPL CALC.SUM OF ELEC: 280 MOSM/KG (ref 275–295)
OSMOLALITY SERPL CALC.SUM OF ELEC: 282 MOSM/KG (ref 275–295)
OSMOLALITY SERPL CALC.SUM OF ELEC: 282 MOSM/KG (ref 275–295)
OSMOLALITY SERPL CALC.SUM OF ELEC: 285 MOSM/KG (ref 275–295)
OSMOLALITY SERPL CALC.SUM OF ELEC: 286 MOSM/KG (ref 275–295)
OSMOLALITY SERPL CALC.SUM OF ELEC: 287 MOSM/KG (ref 275–295)
OSMOLALITY SERPL CALC.SUM OF ELEC: 287 MOSM/KG (ref 275–295)
OSMOLALITY SERPL CALC.SUM OF ELEC: 288 MOSM/KG (ref 275–295)
OSMOLALITY SERPL CALC.SUM OF ELEC: 289 MOSM/KG (ref 275–295)
OSMOLALITY SERPL CALC.SUM OF ELEC: 289 MOSM/KG (ref 275–295)
OSMOLALITY SERPL CALC.SUM OF ELEC: 291 MOSM/KG (ref 275–295)
OSMOLALITY SERPL CALC.SUM OF ELEC: 292 MOSM/KG (ref 275–295)
OSMOLALITY SERPL CALC.SUM OF ELEC: 293 MOSM/KG (ref 275–295)
PATIENT FASTING Y/N/NP: NO
PH UR: 5 [PH] (ref 5–8)
PH UR: 6 [PH] (ref 5–8)
PH UR: 7 [PH] (ref 5–8)
PHOSPHATE SERPL-MCNC: 3.1 MG/DL (ref 2.5–4.9)
PHOSPHATE SERPL-MCNC: 3.4 MG/DL (ref 2.5–4.9)
PHOSPHATE SERPL-MCNC: 3.5 MG/DL (ref 2.5–4.9)
PHOSPHATE SERPL-MCNC: 3.7 MG/DL (ref 2.5–4.9)
PHOSPHATE SERPL-MCNC: 3.8 MG/DL (ref 2.5–4.9)
PHOSPHATE SERPL-MCNC: 4.1 MG/DL (ref 2.5–4.9)
PLATELET # BLD AUTO: 210 10(3)UL (ref 150–450)
PLATELET # BLD AUTO: 219 10(3)UL (ref 150–450)
PLATELET # BLD AUTO: 226 10(3)UL (ref 150–450)
PLATELET # BLD AUTO: 230 10(3)UL (ref 150–450)
PLATELET # BLD AUTO: 241 10(3)UL (ref 150–450)
PLATELET # BLD AUTO: 251 10(3)UL (ref 150–450)
PLATELET # BLD AUTO: 301 10(3)UL (ref 150–450)
PLATELET # BLD AUTO: 323 10(3)UL (ref 150–450)
PLATELET # BLD AUTO: 349 10(3)UL (ref 150–450)
PLATELET # BLD AUTO: 365 10(3)UL (ref 150–450)
POTASSIUM SERPL-SCNC: 3.1 MMOL/L (ref 3.5–5.1)
POTASSIUM SERPL-SCNC: 3.3 MMOL/L (ref 3.5–5.1)
POTASSIUM SERPL-SCNC: 3.4 MMOL/L (ref 3.5–5.1)
POTASSIUM SERPL-SCNC: 3.5 MMOL/L (ref 3.5–5.1)
POTASSIUM SERPL-SCNC: 3.6 MMOL/L (ref 3.5–5.1)
POTASSIUM SERPL-SCNC: 3.6 MMOL/L (ref 3.5–5.1)
POTASSIUM SERPL-SCNC: 3.7 MMOL/L (ref 3.5–5.1)
POTASSIUM SERPL-SCNC: 3.8 MMOL/L (ref 3.5–5.1)
POTASSIUM SERPL-SCNC: 3.8 MMOL/L (ref 3.5–5.1)
POTASSIUM SERPL-SCNC: 3.9 MMOL/L (ref 3.5–5.1)
POTASSIUM SERPL-SCNC: 3.9 MMOL/L (ref 3.5–5.1)
POTASSIUM SERPL-SCNC: 4 MMOL/L (ref 3.5–5.1)
POTASSIUM SERPL-SCNC: 4.1 MMOL/L (ref 3.5–5.1)
POTASSIUM SERPL-SCNC: 4.1 MMOL/L (ref 3.5–5.1)
POTASSIUM SERPL-SCNC: 4.2 MMOL/L (ref 3.5–5.1)
POTASSIUM SERPL-SCNC: 4.3 MMOL/L (ref 3.5–5.1)
PROT UR-MCNC: 30 MG/DL
PROT UR-MCNC: NEGATIVE MG/DL
PROT UR-MCNC: NEGATIVE MG/DL
PROTHROMBIN TIME: 14.5 SECONDS (ref 11.8–14.5)
RBC # BLD AUTO: 4.04 X10(6)UL
RBC # BLD AUTO: 4.13 X10(6)UL
RBC # BLD AUTO: 4.4 X10(6)UL
RBC # BLD AUTO: 4.43 X10(6)UL (ref 4.3–5.7)
RBC # BLD AUTO: 4.45 X10(6)UL (ref 4.3–5.7)
RBC # BLD AUTO: 4.62 X10(6)UL (ref 4.3–5.7)
RBC # BLD AUTO: 4.85 X10(6)UL
RBC # BLD AUTO: 4.92 X10(6)UL (ref 4.3–5.7)
RBC # BLD AUTO: 5.08 X10(6)UL (ref 4.3–5.7)
RBC # BLD AUTO: 5.12 X10(6)UL (ref 4.3–5.7)
RBC #/AREA URNS AUTO: 2 /HPF
SARS-COV-2 RNA RESP QL NAA+PROBE: NOT DETECTED
SODIUM SERPL-SCNC: 132 MMOL/L (ref 136–145)
SODIUM SERPL-SCNC: 135 MMOL/L (ref 136–145)
SODIUM SERPL-SCNC: 136 MMOL/L (ref 136–145)
SODIUM SERPL-SCNC: 136 MMOL/L (ref 136–145)
SODIUM SERPL-SCNC: 137 MMOL/L (ref 136–145)
SODIUM SERPL-SCNC: 137 MMOL/L (ref 136–145)
SODIUM SERPL-SCNC: 139 MMOL/L (ref 136–145)
SODIUM SERPL-SCNC: 140 MMOL/L (ref 136–145)
SODIUM SERPL-SCNC: 141 MMOL/L (ref 136–145)
SODIUM SERPL-SCNC: 142 MMOL/L (ref 136–145)
SP GR UR STRIP: 1.01 (ref 1–1.03)
SP GR UR STRIP: 1.02 (ref 1–1.03)
SP GR UR STRIP: 1.03 (ref 1–1.03)
UROBILINOGEN UR STRIP-ACNC: 4
UROBILINOGEN UR STRIP-ACNC: <2
UROBILINOGEN UR STRIP-ACNC: <2
WBC # BLD AUTO: 4.7 X10(3) UL (ref 4–11)
WBC # BLD AUTO: 5 X10(3) UL (ref 4–11)
WBC # BLD AUTO: 5.3 X10(3) UL (ref 4–11)
WBC # BLD AUTO: 5.3 X10(3) UL (ref 4–11)
WBC # BLD AUTO: 5.7 X10(3) UL (ref 4–11)
WBC # BLD AUTO: 6 X10(3) UL (ref 4–11)
WBC # BLD AUTO: 6.3 X10(3) UL (ref 4–11)
WBC # BLD AUTO: 7.8 X10(3) UL (ref 4–11)
WBC # BLD AUTO: 8.4 X10(3) UL (ref 4–11)
WBC # BLD AUTO: 9.9 X10(3) UL (ref 4–11)
WBC #/AREA URNS AUTO: 3 /HPF

## 2020-01-01 PROCEDURE — 3080F DIAST BP >= 90 MM HG: CPT | Performed by: SURGERY

## 2020-01-01 PROCEDURE — 99233 SBSQ HOSP IP/OBS HIGH 50: CPT | Performed by: HOSPITALIST

## 2020-01-01 PROCEDURE — 1111F DSCHRG MED/CURRENT MED MERGE: CPT | Performed by: SURGERY

## 2020-01-01 PROCEDURE — 99233 SBSQ HOSP IP/OBS HIGH 50: CPT | Performed by: INTERNAL MEDICINE

## 2020-01-01 PROCEDURE — 80053 COMPREHEN METABOLIC PANEL: CPT

## 2020-01-01 PROCEDURE — 99214 OFFICE O/P EST MOD 30 MIN: CPT | Performed by: NURSE PRACTITIONER

## 2020-01-01 PROCEDURE — 96374 THER/PROPH/DIAG INJ IV PUSH: CPT

## 2020-01-01 PROCEDURE — 49083 ABD PARACENTESIS W/IMAGING: CPT | Performed by: INTERNAL MEDICINE

## 2020-01-01 PROCEDURE — 3077F SYST BP >= 140 MM HG: CPT | Performed by: FAMILY MEDICINE

## 2020-01-01 PROCEDURE — 0D580ZZ DESTRUCTION OF SMALL INTESTINE, OPEN APPROACH: ICD-10-PCS | Performed by: SURGERY

## 2020-01-01 PROCEDURE — 0FB00ZZ EXCISION OF LIVER, OPEN APPROACH: ICD-10-PCS | Performed by: SURGERY

## 2020-01-01 PROCEDURE — 80048 BASIC METABOLIC PNL TOTAL CA: CPT | Performed by: EMERGENCY MEDICINE

## 2020-01-01 PROCEDURE — 71045 X-RAY EXAM CHEST 1 VIEW: CPT | Performed by: HOSPITALIST

## 2020-01-01 PROCEDURE — 99215 OFFICE O/P EST HI 40 MIN: CPT | Performed by: INTERNAL MEDICINE

## 2020-01-01 PROCEDURE — 0JBB0ZZ EXCISION OF PERINEUM SUBCUTANEOUS TISSUE AND FASCIA, OPEN APPROACH: ICD-10-PCS | Performed by: SURGERY

## 2020-01-01 PROCEDURE — 96413 CHEMO IV INFUSION 1 HR: CPT

## 2020-01-01 PROCEDURE — 99244 OFF/OP CNSLTJ NEW/EST MOD 40: CPT | Performed by: NURSE PRACTITIONER

## 2020-01-01 PROCEDURE — 0W9G3ZZ DRAINAGE OF PERITONEAL CAVITY, PERCUTANEOUS APPROACH: ICD-10-PCS | Performed by: RADIOLOGY

## 2020-01-01 PROCEDURE — 85025 COMPLETE CBC W/AUTO DIFF WBC: CPT

## 2020-01-01 PROCEDURE — 88363 XM ARCHIVE TISSUE MOLEC ANAL: CPT | Performed by: PATHOLOGY

## 2020-01-01 PROCEDURE — 99214 OFFICE O/P EST MOD 30 MIN: CPT | Performed by: SURGERY

## 2020-01-01 PROCEDURE — 36415 COLL VENOUS BLD VENIPUNCTURE: CPT

## 2020-01-01 PROCEDURE — 0DBU0ZZ EXCISION OF OMENTUM, OPEN APPROACH: ICD-10-PCS | Performed by: SURGERY

## 2020-01-01 PROCEDURE — 85025 COMPLETE CBC W/AUTO DIFF WBC: CPT | Performed by: EMERGENCY MEDICINE

## 2020-01-01 PROCEDURE — 71260 CT THORAX DX C+: CPT | Performed by: INTERNAL MEDICINE

## 2020-01-01 PROCEDURE — 96375 TX/PRO/DX INJ NEW DRUG ADDON: CPT

## 2020-01-01 PROCEDURE — 96372 THER/PROPH/DIAG INJ SC/IM: CPT

## 2020-01-01 PROCEDURE — 74177 CT ABD & PELVIS W/CONTRAST: CPT | Performed by: EMERGENCY MEDICINE

## 2020-01-01 PROCEDURE — 96417 CHEMO IV INFUS EACH ADDL SEQ: CPT

## 2020-01-01 PROCEDURE — 99232 SBSQ HOSP IP/OBS MODERATE 35: CPT | Performed by: INTERNAL MEDICINE

## 2020-01-01 PROCEDURE — 96523 IRRIG DRUG DELIVERY DEVICE: CPT

## 2020-01-01 PROCEDURE — 99284 EMERGENCY DEPT VISIT MOD MDM: CPT

## 2020-01-01 PROCEDURE — 96367 TX/PROPH/DG ADDL SEQ IV INF: CPT

## 2020-01-01 PROCEDURE — 36593 DECLOT VASCULAR DEVICE: CPT

## 2020-01-01 PROCEDURE — 3074F SYST BP LT 130 MM HG: CPT | Performed by: SURGERY

## 2020-01-01 PROCEDURE — 99232 SBSQ HOSP IP/OBS MODERATE 35: CPT | Performed by: HOSPITALIST

## 2020-01-01 PROCEDURE — 99215 OFFICE O/P EST HI 40 MIN: CPT | Performed by: NURSE PRACTITIONER

## 2020-01-01 PROCEDURE — 82378 CARCINOEMBRYONIC ANTIGEN: CPT

## 2020-01-01 PROCEDURE — P9045 ALBUMIN (HUMAN), 5%, 250 ML: HCPCS | Performed by: NURSE ANESTHETIST, CERTIFIED REGISTERED

## 2020-01-01 PROCEDURE — 81003 URINALYSIS AUTO W/O SCOPE: CPT | Performed by: EMERGENCY MEDICINE

## 2020-01-01 PROCEDURE — 36415 COLL VENOUS BLD VENIPUNCTURE: CPT | Performed by: SURGERY

## 2020-01-01 PROCEDURE — 99223 1ST HOSP IP/OBS HIGH 75: CPT | Performed by: HOSPITALIST

## 2020-01-01 PROCEDURE — 99024 POSTOP FOLLOW-UP VISIT: CPT | Performed by: SURGERY

## 2020-01-01 PROCEDURE — 99213 OFFICE O/P EST LOW 20 MIN: CPT | Performed by: SURGERY

## 2020-01-01 PROCEDURE — 78815 PET IMAGE W/CT SKULL-THIGH: CPT | Performed by: INTERNAL MEDICINE

## 2020-01-01 PROCEDURE — 88363 XM ARCHIVE TISSUE MOLEC ANAL: CPT | Performed by: INTERNAL MEDICINE

## 2020-01-01 PROCEDURE — BW40ZZZ ULTRASONOGRAPHY OF ABDOMEN: ICD-10-PCS | Performed by: SURGERY

## 2020-01-01 PROCEDURE — 99239 HOSP IP/OBS DSCHRG MGMT >30: CPT | Performed by: HOSPITALIST

## 2020-01-01 PROCEDURE — 88360 TUMOR IMMUNOHISTOCHEM/MANUAL: CPT

## 2020-01-01 PROCEDURE — 0F500ZZ DESTRUCTION OF LIVER, OPEN APPROACH: ICD-10-PCS | Performed by: SURGERY

## 2020-01-01 PROCEDURE — 99285 EMERGENCY DEPT VISIT HI MDM: CPT

## 2020-01-01 PROCEDURE — 0BBT0ZZ EXCISION OF DIAPHRAGM, OPEN APPROACH: ICD-10-PCS | Performed by: SURGERY

## 2020-01-01 PROCEDURE — 3008F BODY MASS INDEX DOCD: CPT | Performed by: PHYSICAL MEDICINE & REHABILITATION

## 2020-01-01 PROCEDURE — 99233 SBSQ HOSP IP/OBS HIGH 50: CPT | Performed by: NURSE PRACTITIONER

## 2020-01-01 PROCEDURE — 81003 URINALYSIS AUTO W/O SCOPE: CPT

## 2020-01-01 PROCEDURE — 96415 CHEMO IV INFUSION ADDL HR: CPT

## 2020-01-01 PROCEDURE — 82962 GLUCOSE BLOOD TEST: CPT

## 2020-01-01 PROCEDURE — 96368 THER/DIAG CONCURRENT INF: CPT

## 2020-01-01 PROCEDURE — 80053 COMPREHEN METABOLIC PANEL: CPT | Performed by: SURGERY

## 2020-01-01 PROCEDURE — 85025 COMPLETE CBC W/AUTO DIFF WBC: CPT | Performed by: SURGERY

## 2020-01-01 PROCEDURE — 99214 OFFICE O/P EST MOD 30 MIN: CPT | Performed by: PHYSICAL MEDICINE & REHABILITATION

## 2020-01-01 PROCEDURE — 99223 1ST HOSP IP/OBS HIGH 75: CPT | Performed by: INTERNAL MEDICINE

## 2020-01-01 PROCEDURE — 74177 CT ABD & PELVIS W/CONTRAST: CPT | Performed by: INTERNAL MEDICINE

## 2020-01-01 PROCEDURE — 3077F SYST BP >= 140 MM HG: CPT | Performed by: SURGERY

## 2020-01-01 PROCEDURE — 74177 CT ABD & PELVIS W/CONTRAST: CPT | Performed by: SURGERY

## 2020-01-01 PROCEDURE — 99253 IP/OBS CNSLTJ NEW/EST LOW 45: CPT | Performed by: INTERNAL MEDICINE

## 2020-01-01 PROCEDURE — 74019 RADEX ABDOMEN 2 VIEWS: CPT | Performed by: EMERGENCY MEDICINE

## 2020-01-01 PROCEDURE — A4216 STERILE WATER/SALINE, 10 ML: HCPCS

## 2020-01-01 PROCEDURE — 88363 XM ARCHIVE TISSUE MOLEC ANAL: CPT | Performed by: SURGERY

## 2020-01-01 PROCEDURE — 3079F DIAST BP 80-89 MM HG: CPT | Performed by: SURGERY

## 2020-01-01 PROCEDURE — 0DBW0ZZ EXCISION OF PERITONEUM, OPEN APPROACH: ICD-10-PCS | Performed by: SURGERY

## 2020-01-01 PROCEDURE — 96411 CHEMO IV PUSH ADDL DRUG: CPT

## 2020-01-01 PROCEDURE — 74177 CT ABD & PELVIS W/CONTRAST: CPT | Performed by: HOSPITALIST

## 2020-01-01 PROCEDURE — 99231 SBSQ HOSP IP/OBS SF/LOW 25: CPT | Performed by: INTERNAL MEDICINE

## 2020-01-01 PROCEDURE — 99233 SBSQ HOSP IP/OBS HIGH 50: CPT | Performed by: PHYSICIAN ASSISTANT

## 2020-01-01 PROCEDURE — 76705 ECHO EXAM OF ABDOMEN: CPT | Performed by: INTERNAL MEDICINE

## 2020-01-01 PROCEDURE — 86850 RBC ANTIBODY SCREEN: CPT

## 2020-01-01 PROCEDURE — 99213 OFFICE O/P EST LOW 20 MIN: CPT | Performed by: FAMILY MEDICINE

## 2020-01-01 PROCEDURE — 86900 BLOOD TYPING SEROLOGIC ABO: CPT

## 2020-01-01 PROCEDURE — 96361 HYDRATE IV INFUSION ADD-ON: CPT

## 2020-01-01 PROCEDURE — 71260 CT THORAX DX C+: CPT | Performed by: SURGERY

## 2020-01-01 PROCEDURE — 86901 BLOOD TYPING SEROLOGIC RH(D): CPT

## 2020-01-01 PROCEDURE — 99253 IP/OBS CNSLTJ NEW/EST LOW 45: CPT | Performed by: NURSE PRACTITIONER

## 2020-01-01 PROCEDURE — 3008F BODY MASS INDEX DOCD: CPT | Performed by: FAMILY MEDICINE

## 2020-01-01 PROCEDURE — 3080F DIAST BP >= 90 MM HG: CPT | Performed by: FAMILY MEDICINE

## 2020-01-01 DEVICE — SEPRAFILM ADHESION BARRIER (MEMBRANE) IS A STERILE, BIORESORBABLE, TRANSLUCENT ADHESION BARRIER COMPOSED OF TWO ANIONIC POLYSACCHARIDES, SODIUM HYALURONATE (HA) AND CARBOXYMETHYLCELLULOSE (CMC).
Type: IMPLANTABLE DEVICE | Site: ABDOMEN | Status: FUNCTIONAL
Brand: SEPRAFILM

## 2020-01-01 RX ORDER — POLYETHYLENE GLYCOL 3350 17 G/17G
17 POWDER, FOR SOLUTION ORAL DAILY
Status: DISCONTINUED | OUTPATIENT
Start: 2020-01-01 | End: 2020-01-01

## 2020-01-01 RX ORDER — HEPARIN SODIUM (PORCINE) LOCK FLUSH IV SOLN 100 UNIT/ML 100 UNIT/ML
5 SOLUTION INTRAVENOUS ONCE
Status: CANCELLED | OUTPATIENT
Start: 2020-01-01

## 2020-01-01 RX ORDER — LIDOCAINE AND PRILOCAINE 25; 25 MG/G; MG/G
CREAM TOPICAL ONCE
Status: COMPLETED | OUTPATIENT
Start: 2020-01-01 | End: 2020-01-01

## 2020-01-01 RX ORDER — AMOXICILLIN AND CLAVULANATE POTASSIUM 875; 125 MG/1; MG/1
1 TABLET, FILM COATED ORAL EVERY 12 HOURS SCHEDULED
Status: DISCONTINUED | OUTPATIENT
Start: 2020-01-01 | End: 2020-01-01

## 2020-01-01 RX ORDER — 0.9 % SODIUM CHLORIDE 0.9 %
VIAL (ML) INJECTION
Status: DISCONTINUED
Start: 2020-01-01 | End: 2020-01-01

## 2020-01-01 RX ORDER — AMITRIPTYLINE HYDROCHLORIDE 10 MG/1
10 TABLET, FILM COATED ORAL NIGHTLY
Qty: 30 TABLET | Refills: 0 | Status: SHIPPED | OUTPATIENT
Start: 2020-01-01 | End: 2020-01-01 | Stop reason: ALTCHOICE

## 2020-01-01 RX ORDER — ONDANSETRON 2 MG/ML
4 INJECTION INTRAMUSCULAR; INTRAVENOUS ONCE
Status: COMPLETED | OUTPATIENT
Start: 2020-01-01 | End: 2020-01-01

## 2020-01-01 RX ORDER — 0.9 % SODIUM CHLORIDE 0.9 %
10 VIAL (ML) INJECTION ONCE
Status: CANCELLED | OUTPATIENT
Start: 2020-01-01

## 2020-01-01 RX ORDER — ONDANSETRON 8 MG/1
8 TABLET, ORALLY DISINTEGRATING ORAL EVERY 8 HOURS PRN
Qty: 30 TABLET | Refills: 3 | Status: SHIPPED | OUTPATIENT
Start: 2020-01-01 | End: 2020-01-01 | Stop reason: ALTCHOICE

## 2020-01-01 RX ORDER — METOCLOPRAMIDE HYDROCHLORIDE 5 MG/ML
10 INJECTION INTRAMUSCULAR; INTRAVENOUS EVERY 6 HOURS PRN
Status: DISCONTINUED | OUTPATIENT
Start: 2020-01-01 | End: 2020-01-01 | Stop reason: DRUGHIGH

## 2020-01-01 RX ORDER — HEPARIN SODIUM (PORCINE) LOCK FLUSH IV SOLN 100 UNIT/ML 100 UNIT/ML
5 SOLUTION INTRAVENOUS ONCE
Status: COMPLETED | OUTPATIENT
Start: 2020-01-01 | End: 2020-01-01

## 2020-01-01 RX ORDER — SENNA AND DOCUSATE SODIUM 50; 8.6 MG/1; MG/1
2 TABLET, FILM COATED ORAL 2 TIMES DAILY
Status: DISCONTINUED | OUTPATIENT
Start: 2020-01-01 | End: 2020-01-01

## 2020-01-01 RX ORDER — HYDROCODONE BITARTRATE AND ACETAMINOPHEN 5; 325 MG/1; MG/1
1 TABLET ORAL AS NEEDED
Status: DISCONTINUED | OUTPATIENT
Start: 2020-01-01 | End: 2020-01-01 | Stop reason: HOSPADM

## 2020-01-01 RX ORDER — HYDROMORPHONE HYDROCHLORIDE 1 MG/ML
0.4 INJECTION, SOLUTION INTRAMUSCULAR; INTRAVENOUS; SUBCUTANEOUS
Status: ACTIVE | OUTPATIENT
Start: 2020-01-01 | End: 2020-01-01

## 2020-01-01 RX ORDER — DEXTROSE MONOHYDRATE 25 G/50ML
50 INJECTION, SOLUTION INTRAVENOUS
Status: DISCONTINUED | OUTPATIENT
Start: 2020-01-01 | End: 2020-01-01

## 2020-01-01 RX ORDER — DIPHENHYDRAMINE HYDROCHLORIDE 50 MG/ML
12.5 INJECTION INTRAMUSCULAR; INTRAVENOUS EVERY 4 HOURS PRN
Status: ACTIVE | OUTPATIENT
Start: 2020-01-01 | End: 2020-01-01

## 2020-01-01 RX ORDER — ROCURONIUM BROMIDE 10 MG/ML
INJECTION, SOLUTION INTRAVENOUS AS NEEDED
Status: DISCONTINUED | OUTPATIENT
Start: 2020-01-01 | End: 2020-01-01 | Stop reason: SURG

## 2020-01-01 RX ORDER — FLUOROURACIL 50 MG/ML
400 INJECTION, SOLUTION INTRAVENOUS ONCE
Status: CANCELLED | OUTPATIENT
Start: 2020-01-01

## 2020-01-01 RX ORDER — ONDANSETRON 2 MG/ML
4 INJECTION INTRAMUSCULAR; INTRAVENOUS EVERY 6 HOURS PRN
Status: DISCONTINUED | OUTPATIENT
Start: 2020-01-01 | End: 2020-01-01

## 2020-01-01 RX ORDER — ATROPINE SULFATE 0.4 MG/ML
0.2 AMPUL (ML) INJECTION ONCE
Status: COMPLETED | OUTPATIENT
Start: 2020-01-01 | End: 2020-01-01

## 2020-01-01 RX ORDER — LIDOCAINE HYDROCHLORIDE 10 MG/ML
INJECTION, SOLUTION EPIDURAL; INFILTRATION; INTRACAUDAL; PERINEURAL AS NEEDED
Status: DISCONTINUED | OUTPATIENT
Start: 2020-01-01 | End: 2020-01-01 | Stop reason: SURG

## 2020-01-01 RX ORDER — METOCLOPRAMIDE 10 MG/1
10 TABLET ORAL ONCE
Status: COMPLETED | OUTPATIENT
Start: 2020-01-01 | End: 2020-01-01

## 2020-01-01 RX ORDER — HYDROMORPHONE HYDROCHLORIDE 1 MG/ML
0.2 INJECTION, SOLUTION INTRAMUSCULAR; INTRAVENOUS; SUBCUTANEOUS EVERY 2 HOUR PRN
Status: DISCONTINUED | OUTPATIENT
Start: 2020-01-01 | End: 2020-01-01

## 2020-01-01 RX ORDER — FLUOROURACIL 50 MG/ML
2400 INJECTION, SOLUTION INTRAVENOUS CONTINUOUS
Status: CANCELLED | OUTPATIENT
Start: 2020-01-01

## 2020-01-01 RX ORDER — HYDROMORPHONE HYDROCHLORIDE 1 MG/ML
0.6 INJECTION, SOLUTION INTRAMUSCULAR; INTRAVENOUS; SUBCUTANEOUS EVERY 5 MIN PRN
Status: DISCONTINUED | OUTPATIENT
Start: 2020-01-01 | End: 2020-01-01 | Stop reason: HOSPADM

## 2020-01-01 RX ORDER — SODIUM CHLORIDE, SODIUM LACTATE, POTASSIUM CHLORIDE, CALCIUM CHLORIDE 600; 310; 30; 20 MG/100ML; MG/100ML; MG/100ML; MG/100ML
INJECTION, SOLUTION INTRAVENOUS CONTINUOUS
Status: DISCONTINUED | OUTPATIENT
Start: 2020-01-01 | End: 2020-01-01 | Stop reason: HOSPADM

## 2020-01-01 RX ORDER — POTASSIUM CHLORIDE 20 MEQ/1
40 TABLET, EXTENDED RELEASE ORAL ONCE
Status: DISCONTINUED | OUTPATIENT
Start: 2020-01-01 | End: 2020-01-01

## 2020-01-01 RX ORDER — ACETAMINOPHEN 500 MG
1000 TABLET ORAL EVERY 6 HOURS PRN
Status: DISCONTINUED | OUTPATIENT
Start: 2020-01-01 | End: 2020-01-01

## 2020-01-01 RX ORDER — HYDROCODONE BITARTRATE AND ACETAMINOPHEN 5; 325 MG/1; MG/1
1 TABLET ORAL EVERY 6 HOURS PRN
Qty: 8 TABLET | Refills: 0 | Status: SHIPPED | OUTPATIENT
Start: 2020-01-01 | End: 2020-01-01

## 2020-01-01 RX ORDER — POLYETHYLENE GLYCOL 3350 17 G/17G
17 POWDER, FOR SOLUTION ORAL DAILY PRN
Qty: 30 EACH | Refills: 0 | Status: SHIPPED | OUTPATIENT
Start: 2020-01-01

## 2020-01-01 RX ORDER — ENOXAPARIN SODIUM 100 MG/ML
40 INJECTION SUBCUTANEOUS DAILY
Status: DISCONTINUED | OUTPATIENT
Start: 2020-01-01 | End: 2020-01-01

## 2020-01-01 RX ORDER — PROCHLORPERAZINE EDISYLATE 5 MG/ML
5 INJECTION INTRAMUSCULAR; INTRAVENOUS ONCE AS NEEDED
Status: ACTIVE | OUTPATIENT
Start: 2020-01-01 | End: 2020-01-01

## 2020-01-01 RX ORDER — HYDROCODONE BITARTRATE AND ACETAMINOPHEN 7.5; 325 MG/1; MG/1
1 TABLET ORAL EVERY 6 HOURS PRN
Qty: 40 TABLET | Refills: 0 | Status: ON HOLD | OUTPATIENT
Start: 2020-01-01 | End: 2020-01-01

## 2020-01-01 RX ORDER — HYDROCODONE BITARTRATE AND ACETAMINOPHEN 5; 325 MG/1; MG/1
2 TABLET ORAL AS NEEDED
Status: DISCONTINUED | OUTPATIENT
Start: 2020-01-01 | End: 2020-01-01 | Stop reason: HOSPADM

## 2020-01-01 RX ORDER — FAMOTIDINE 20 MG/1
20 TABLET ORAL ONCE
Status: COMPLETED | OUTPATIENT
Start: 2020-01-01 | End: 2020-01-01

## 2020-01-01 RX ORDER — GABAPENTIN 800 MG/1
800 TABLET ORAL 3 TIMES DAILY
Qty: 270 TABLET | Refills: 2 | Status: ON HOLD | OUTPATIENT
Start: 2020-01-01 | End: 2020-01-01

## 2020-01-01 RX ORDER — POLYETHYLENE GLYCOL 3350 17 G/17G
17 POWDER, FOR SOLUTION ORAL DAILY PRN
Status: DISCONTINUED | OUTPATIENT
Start: 2020-01-01 | End: 2020-01-01

## 2020-01-01 RX ORDER — ONDANSETRON HYDROCHLORIDE 8 MG/1
8 TABLET, FILM COATED ORAL EVERY 8 HOURS PRN
Qty: 30 TABLET | Refills: 3 | Status: SHIPPED | OUTPATIENT
Start: 2020-01-01 | End: 2020-01-01 | Stop reason: ALTCHOICE

## 2020-01-01 RX ORDER — ATROPINE SULFATE 0.4 MG/ML
AMPUL (ML) INJECTION
Status: COMPLETED
Start: 2020-01-01 | End: 2020-01-01

## 2020-01-01 RX ORDER — HYDROCODONE BITARTRATE AND ACETAMINOPHEN 7.5; 325 MG/1; MG/1
1 TABLET ORAL EVERY 6 HOURS PRN
Status: DISCONTINUED | OUTPATIENT
Start: 2020-01-01 | End: 2020-01-01

## 2020-01-01 RX ORDER — MORPHINE SULFATE 15 MG/1
15 TABLET, FILM COATED, EXTENDED RELEASE ORAL EVERY 12 HOURS SCHEDULED
Qty: 60 TABLET | Refills: 0 | Status: SHIPPED | OUTPATIENT
Start: 2020-01-01 | End: 2020-01-01

## 2020-01-01 RX ORDER — PROCHLORPERAZINE EDISYLATE 5 MG/ML
10 INJECTION INTRAMUSCULAR; INTRAVENOUS EVERY 6 HOURS PRN
Status: DISCONTINUED | OUTPATIENT
Start: 2020-01-01 | End: 2020-01-01

## 2020-01-01 RX ORDER — SODIUM CHLORIDE, SODIUM LACTATE, POTASSIUM CHLORIDE, CALCIUM CHLORIDE 600; 310; 30; 20 MG/100ML; MG/100ML; MG/100ML; MG/100ML
INJECTION, SOLUTION INTRAVENOUS CONTINUOUS
Status: DISCONTINUED | OUTPATIENT
Start: 2020-01-01 | End: 2020-01-01

## 2020-01-01 RX ORDER — HEPARIN SODIUM 5000 [USP'U]/ML
5000 INJECTION, SOLUTION INTRAVENOUS; SUBCUTANEOUS EVERY 12 HOURS SCHEDULED
Status: DISCONTINUED | OUTPATIENT
Start: 2020-01-01 | End: 2020-01-01

## 2020-01-01 RX ORDER — ONDANSETRON 2 MG/ML
4 INJECTION INTRAMUSCULAR; INTRAVENOUS ONCE AS NEEDED
Status: DISCONTINUED | OUTPATIENT
Start: 2020-01-01 | End: 2020-01-01 | Stop reason: HOSPADM

## 2020-01-01 RX ORDER — MORPHINE SULFATE 1 MG/ML
INJECTION, SOLUTION EPIDURAL; INTRATHECAL; INTRAVENOUS AS NEEDED
Status: DISCONTINUED | OUTPATIENT
Start: 2020-01-01 | End: 2020-01-01 | Stop reason: SURG

## 2020-01-01 RX ORDER — NALOXONE HYDROCHLORIDE 0.4 MG/ML
80 INJECTION, SOLUTION INTRAMUSCULAR; INTRAVENOUS; SUBCUTANEOUS AS NEEDED
Status: DISCONTINUED | OUTPATIENT
Start: 2020-01-01 | End: 2020-01-01 | Stop reason: HOSPADM

## 2020-01-01 RX ORDER — POTASSIUM CHLORIDE 20 MEQ/1
40 TABLET, EXTENDED RELEASE ORAL ONCE
Status: COMPLETED | OUTPATIENT
Start: 2020-01-01 | End: 2020-01-01

## 2020-01-01 RX ORDER — HYDROCODONE BITARTRATE AND ACETAMINOPHEN 7.5; 325 MG/1; MG/1
1 TABLET ORAL EVERY 6 HOURS PRN
Qty: 20 TABLET | Refills: 0 | Status: SHIPPED | OUTPATIENT
Start: 2020-01-01 | End: 2020-01-01

## 2020-01-01 RX ORDER — HYDROCODONE BITARTRATE AND ACETAMINOPHEN 7.5; 325 MG/1; MG/1
1 TABLET ORAL EVERY 6 HOURS PRN
Qty: 40 TABLET | Refills: 0 | Status: CANCELLED | OUTPATIENT
Start: 2020-01-01

## 2020-01-01 RX ORDER — NALOXONE HYDROCHLORIDE 0.4 MG/ML
0.08 INJECTION, SOLUTION INTRAMUSCULAR; INTRAVENOUS; SUBCUTANEOUS
Status: DISCONTINUED | OUTPATIENT
Start: 2020-01-01 | End: 2020-01-01

## 2020-01-01 RX ORDER — PHENYLEPHRINE HCL 10 MG/ML
VIAL (ML) INJECTION AS NEEDED
Status: DISCONTINUED | OUTPATIENT
Start: 2020-01-01 | End: 2020-01-01 | Stop reason: SURG

## 2020-01-01 RX ORDER — OXYCODONE HYDROCHLORIDE 5 MG/1
5 TABLET ORAL EVERY 4 HOURS PRN
Status: DISCONTINUED | OUTPATIENT
Start: 2020-01-01 | End: 2020-01-01

## 2020-01-01 RX ORDER — SODIUM PHOSPHATE, DIBASIC AND SODIUM PHOSPHATE, MONOBASIC 7; 19 G/133ML; G/133ML
1 ENEMA RECTAL ONCE
Status: COMPLETED | OUTPATIENT
Start: 2020-01-01 | End: 2020-01-01

## 2020-01-01 RX ORDER — 0.9 % SODIUM CHLORIDE 0.9 %
VIAL (ML) INJECTION
Status: DISCONTINUED
Start: 2020-01-01 | End: 2020-01-01 | Stop reason: WASHOUT

## 2020-01-01 RX ORDER — BISACODYL 10 MG
10 SUPPOSITORY, RECTAL RECTAL
Status: DISCONTINUED | OUTPATIENT
Start: 2020-01-01 | End: 2020-01-01

## 2020-01-01 RX ORDER — SCOLOPAMINE TRANSDERMAL SYSTEM 1 MG/1
1 PATCH, EXTENDED RELEASE TRANSDERMAL
Refills: 0 | Status: SHIPPED | COMMUNITY
Start: 2021-01-01

## 2020-01-01 RX ORDER — GABAPENTIN 800 MG/1
800 TABLET ORAL 3 TIMES DAILY
COMMUNITY
End: 2020-01-01 | Stop reason: ALTCHOICE

## 2020-01-01 RX ORDER — NALOXONE HYDROCHLORIDE 0.4 MG/ML
0.08 INJECTION, SOLUTION INTRAMUSCULAR; INTRAVENOUS; SUBCUTANEOUS
Status: ACTIVE | OUTPATIENT
Start: 2020-01-01 | End: 2020-01-01

## 2020-01-01 RX ORDER — ATROPINE SULFATE 0.4 MG/ML
0.2 AMPUL (ML) INJECTION ONCE
Status: CANCELLED | OUTPATIENT
Start: 2020-01-01

## 2020-01-01 RX ORDER — HALOPERIDOL 5 MG/ML
0.25 INJECTION INTRAMUSCULAR ONCE AS NEEDED
Status: DISCONTINUED | OUTPATIENT
Start: 2020-01-01 | End: 2020-01-01 | Stop reason: HOSPADM

## 2020-01-01 RX ORDER — AMLODIPINE BESYLATE 10 MG/1
10 TABLET ORAL
Qty: 90 TABLET | Refills: 1 | Status: SHIPPED | OUTPATIENT
Start: 2020-01-01 | End: 2020-01-01

## 2020-01-01 RX ORDER — ONDANSETRON HYDROCHLORIDE 8 MG/1
8 TABLET, FILM COATED ORAL EVERY 8 HOURS PRN
Qty: 60 TABLET | Refills: 3 | Status: SHIPPED | OUTPATIENT
Start: 2020-01-01

## 2020-01-01 RX ORDER — HEPARIN SODIUM 5000 [USP'U]/ML
5000 INJECTION, SOLUTION INTRAVENOUS; SUBCUTANEOUS ONCE
Status: COMPLETED | OUTPATIENT
Start: 2020-01-01 | End: 2020-01-01

## 2020-01-01 RX ORDER — OXYCODONE HYDROCHLORIDE 5 MG/1
5 TABLET ORAL EVERY 4 HOURS PRN
Qty: 30 TABLET | Refills: 0 | Status: SHIPPED | OUTPATIENT
Start: 2020-01-01 | End: 2020-01-01 | Stop reason: ALTCHOICE

## 2020-01-01 RX ORDER — HYDROMORPHONE HYDROCHLORIDE 1 MG/ML
0.2 INJECTION, SOLUTION INTRAMUSCULAR; INTRAVENOUS; SUBCUTANEOUS EVERY 5 MIN PRN
Status: DISCONTINUED | OUTPATIENT
Start: 2020-01-01 | End: 2020-01-01 | Stop reason: HOSPADM

## 2020-01-01 RX ORDER — PROCHLORPERAZINE MALEATE 10 MG
10 TABLET ORAL EVERY 6 HOURS PRN
Qty: 30 TABLET | Refills: 3 | Status: SHIPPED | OUTPATIENT
Start: 2020-01-01 | End: 2020-01-01 | Stop reason: ALTCHOICE

## 2020-01-01 RX ORDER — FLUOROURACIL 50 MG/ML
2400 INJECTION, SOLUTION INTRAVENOUS CONTINUOUS
Status: DISCONTINUED | OUTPATIENT
Start: 2020-01-01 | End: 2020-01-01

## 2020-01-01 RX ORDER — HYDROMORPHONE HYDROCHLORIDE 2 MG/1
2 TABLET ORAL EVERY 4 HOURS PRN
Refills: 0 | Status: SHIPPED | COMMUNITY
Start: 2020-01-01

## 2020-01-01 RX ORDER — EPHEDRINE SULFATE 50 MG/ML
INJECTION, SOLUTION INTRAVENOUS AS NEEDED
Status: DISCONTINUED | OUTPATIENT
Start: 2020-01-01 | End: 2020-01-01 | Stop reason: SURG

## 2020-01-01 RX ORDER — BUPIVACAINE HYDROCHLORIDE 7.5 MG/ML
INJECTION, SOLUTION INTRASPINAL AS NEEDED
Status: DISCONTINUED | OUTPATIENT
Start: 2020-01-01 | End: 2020-01-01 | Stop reason: SURG

## 2020-01-01 RX ORDER — SODIUM PHOSPHATE, DIBASIC AND SODIUM PHOSPHATE, MONOBASIC 7; 19 G/133ML; G/133ML
1 ENEMA RECTAL ONCE AS NEEDED
Status: COMPLETED | OUTPATIENT
Start: 2020-01-01 | End: 2020-01-01

## 2020-01-01 RX ORDER — HYDROCODONE BITARTRATE AND ACETAMINOPHEN 7.5; 325 MG/1; MG/1
1-2 TABLET ORAL
Qty: 150 TABLET | Refills: 0 | Status: SHIPPED | OUTPATIENT
Start: 2020-01-01 | End: 2020-01-01 | Stop reason: DRUGHIGH

## 2020-01-01 RX ORDER — ONDANSETRON 2 MG/ML
INJECTION INTRAMUSCULAR; INTRAVENOUS
Status: COMPLETED
Start: 2020-01-01 | End: 2020-01-01

## 2020-01-01 RX ORDER — HYDROCODONE BITARTRATE AND ACETAMINOPHEN 7.5; 325 MG/1; MG/1
1 TABLET ORAL EVERY 6 HOURS PRN
Qty: 60 TABLET | Refills: 0 | Status: SHIPPED | OUTPATIENT
Start: 2020-01-01 | End: 2020-01-01

## 2020-01-01 RX ORDER — TRAMADOL HYDROCHLORIDE 50 MG/1
TABLET ORAL EVERY 4 HOURS PRN
Qty: 20 TABLET | Refills: 0 | Status: SHIPPED | OUTPATIENT
Start: 2020-01-01 | End: 2020-01-01

## 2020-01-01 RX ORDER — MIDAZOLAM HYDROCHLORIDE 1 MG/ML
INJECTION INTRAMUSCULAR; INTRAVENOUS AS NEEDED
Status: DISCONTINUED | OUTPATIENT
Start: 2020-01-01 | End: 2020-01-01 | Stop reason: SURG

## 2020-01-01 RX ORDER — FLUTICASONE PROPIONATE 50 MCG
1 SPRAY, SUSPENSION (ML) NASAL DAILY
Status: DISCONTINUED | OUTPATIENT
Start: 2020-01-01 | End: 2020-01-01

## 2020-01-01 RX ORDER — PROCHLORPERAZINE EDISYLATE 5 MG/ML
5 INJECTION INTRAMUSCULAR; INTRAVENOUS ONCE AS NEEDED
Status: DISCONTINUED | OUTPATIENT
Start: 2020-01-01 | End: 2020-01-01 | Stop reason: HOSPADM

## 2020-01-01 RX ORDER — MORPHINE SULFATE 4 MG/ML
8 INJECTION, SOLUTION INTRAMUSCULAR; INTRAVENOUS ONCE
Status: COMPLETED | OUTPATIENT
Start: 2020-01-01 | End: 2020-01-01

## 2020-01-01 RX ORDER — HYDROMORPHONE HYDROCHLORIDE 2 MG/1
2 TABLET ORAL EVERY 4 HOURS PRN
Status: DISCONTINUED | OUTPATIENT
Start: 2020-01-01 | End: 2020-01-01

## 2020-01-01 RX ORDER — HYDROCODONE BITARTRATE AND ACETAMINOPHEN 7.5; 325 MG/1; MG/1
2 TABLET ORAL EVERY 6 HOURS PRN
Status: DISCONTINUED | OUTPATIENT
Start: 2020-01-01 | End: 2020-01-01

## 2020-01-01 RX ORDER — ACETAMINOPHEN 325 MG/1
650 TABLET ORAL EVERY 6 HOURS PRN
Status: DISCONTINUED | OUTPATIENT
Start: 2020-01-01 | End: 2020-01-01

## 2020-01-01 RX ORDER — FENTANYL 50 UG/H
1 PATCH TRANSDERMAL
Refills: 0 | Status: SHIPPED | COMMUNITY
Start: 2020-01-01

## 2020-01-01 RX ORDER — SCOLOPAMINE TRANSDERMAL SYSTEM 1 MG/1
1 PATCH, EXTENDED RELEASE TRANSDERMAL
Status: DISCONTINUED | OUTPATIENT
Start: 2020-01-01 | End: 2020-01-01

## 2020-01-01 RX ORDER — HYDROMORPHONE HYDROCHLORIDE 1 MG/ML
0.8 INJECTION, SOLUTION INTRAMUSCULAR; INTRAVENOUS; SUBCUTANEOUS EVERY 2 HOUR PRN
Status: DISCONTINUED | OUTPATIENT
Start: 2020-01-01 | End: 2020-01-01

## 2020-01-01 RX ORDER — HYDROMORPHONE HYDROCHLORIDE 1 MG/ML
0.4 INJECTION, SOLUTION INTRAMUSCULAR; INTRAVENOUS; SUBCUTANEOUS EVERY 5 MIN PRN
Status: DISCONTINUED | OUTPATIENT
Start: 2020-01-01 | End: 2020-01-01 | Stop reason: HOSPADM

## 2020-01-01 RX ORDER — AMLODIPINE BESYLATE 5 MG/1
5 TABLET ORAL
Status: DISCONTINUED | OUTPATIENT
Start: 2020-01-01 | End: 2020-01-01

## 2020-01-01 RX ORDER — HYDROMORPHONE HYDROCHLORIDE 1 MG/ML
0.4 INJECTION, SOLUTION INTRAMUSCULAR; INTRAVENOUS; SUBCUTANEOUS EVERY 2 HOUR PRN
Status: DISCONTINUED | OUTPATIENT
Start: 2020-01-01 | End: 2020-01-01

## 2020-01-01 RX ORDER — HYDRALAZINE HYDROCHLORIDE 20 MG/ML
10 INJECTION INTRAMUSCULAR; INTRAVENOUS EVERY 4 HOURS PRN
Status: DISCONTINUED | OUTPATIENT
Start: 2020-01-01 | End: 2020-01-01

## 2020-01-01 RX ORDER — MORPHINE SULFATE 4 MG/ML
2 INJECTION, SOLUTION INTRAMUSCULAR; INTRAVENOUS EVERY 10 MIN PRN
Status: DISCONTINUED | OUTPATIENT
Start: 2020-01-01 | End: 2020-01-01 | Stop reason: HOSPADM

## 2020-01-01 RX ORDER — FAMOTIDINE 20 MG/1
20 TABLET ORAL 2 TIMES DAILY
Status: DISCONTINUED | OUTPATIENT
Start: 2020-01-01 | End: 2020-01-01

## 2020-01-01 RX ORDER — OXYCODONE HYDROCHLORIDE 5 MG/1
5 TABLET ORAL EVERY 4 HOURS PRN
Qty: 35 TABLET | Refills: 0 | Status: SHIPPED | OUTPATIENT
Start: 2020-01-01 | End: 2020-01-01

## 2020-01-01 RX ORDER — HYDROMORPHONE HYDROCHLORIDE 1 MG/ML
0.6 INJECTION, SOLUTION INTRAMUSCULAR; INTRAVENOUS; SUBCUTANEOUS
Status: DISCONTINUED | OUTPATIENT
Start: 2020-01-01 | End: 2020-01-01

## 2020-01-01 RX ORDER — OMEPRAZOLE 40 MG/1
40 CAPSULE, DELAYED RELEASE ORAL DAILY
Qty: 30 CAPSULE | Refills: 11 | Status: SHIPPED | OUTPATIENT
Start: 2020-01-01

## 2020-01-01 RX ORDER — SODIUM CHLORIDE 9 MG/ML
INJECTION, SOLUTION INTRAVENOUS CONTINUOUS PRN
Status: DISCONTINUED | OUTPATIENT
Start: 2020-01-01 | End: 2020-01-01 | Stop reason: SURG

## 2020-01-01 RX ORDER — ACETAMINOPHEN 10 MG/ML
1000 INJECTION, SOLUTION INTRAVENOUS EVERY 6 HOURS
Status: DISCONTINUED | OUTPATIENT
Start: 2020-01-01 | End: 2020-01-01

## 2020-01-01 RX ORDER — LACTULOSE 10 G/15ML
10 SOLUTION ORAL EVERY 6 HOURS PRN
Status: DISCONTINUED | OUTPATIENT
Start: 2020-01-01 | End: 2020-01-01

## 2020-01-01 RX ORDER — SODIUM CHLORIDE 9 MG/ML
INJECTION, SOLUTION INTRAVENOUS ONCE
Status: COMPLETED | OUTPATIENT
Start: 2020-01-01 | End: 2020-01-01

## 2020-01-01 RX ORDER — TRAMADOL HYDROCHLORIDE 50 MG/1
TABLET ORAL EVERY 4 HOURS PRN
Qty: 50 TABLET | Refills: 0 | Status: SHIPPED | OUTPATIENT
Start: 2020-01-01 | End: 2020-01-01 | Stop reason: ALTCHOICE

## 2020-01-01 RX ORDER — ONDANSETRON 2 MG/ML
4 INJECTION INTRAMUSCULAR; INTRAVENOUS ONCE AS NEEDED
Status: ACTIVE | OUTPATIENT
Start: 2020-01-01 | End: 2020-01-01

## 2020-01-01 RX ORDER — MAGNESIUM CARB/ALUMINUM HYDROX 105-160MG
296 TABLET,CHEWABLE ORAL ONCE
Status: COMPLETED | OUTPATIENT
Start: 2020-01-01 | End: 2020-01-01

## 2020-01-01 RX ORDER — POTASSIUM CHLORIDE 1500 MG/1
40 TABLET, FILM COATED, EXTENDED RELEASE ORAL DAILY
Qty: 60 TABLET | Refills: 3 | Status: ON HOLD | OUTPATIENT
Start: 2020-01-01 | End: 2020-01-01

## 2020-01-01 RX ORDER — FLUOROURACIL 50 MG/ML
400 INJECTION, SOLUTION INTRAVENOUS ONCE
Status: COMPLETED | OUTPATIENT
Start: 2020-01-01 | End: 2020-01-01

## 2020-01-01 RX ORDER — ONDANSETRON 2 MG/ML
INJECTION INTRAMUSCULAR; INTRAVENOUS AS NEEDED
Status: DISCONTINUED | OUTPATIENT
Start: 2020-01-01 | End: 2020-01-01 | Stop reason: SURG

## 2020-01-01 RX ORDER — METOCLOPRAMIDE 10 MG/1
10 TABLET ORAL
Status: DISCONTINUED | OUTPATIENT
Start: 2020-01-01 | End: 2020-01-01

## 2020-01-01 RX ORDER — HALOPERIDOL 5 MG/ML
0.5 INJECTION INTRAMUSCULAR ONCE AS NEEDED
Status: ACTIVE | OUTPATIENT
Start: 2020-01-01 | End: 2020-01-01

## 2020-01-01 RX ORDER — DOCUSATE SODIUM 100 MG/1
100 CAPSULE, LIQUID FILLED ORAL 2 TIMES DAILY
Qty: 14 CAPSULE | Refills: 0 | Status: SHIPPED | OUTPATIENT
Start: 2020-01-01 | End: 2020-01-01

## 2020-01-01 RX ORDER — PREGABALIN 25 MG/1
25 CAPSULE ORAL 2 TIMES DAILY
COMMUNITY
End: 2020-01-01

## 2020-01-01 RX ORDER — METRONIDAZOLE 500 MG/1
500 TABLET ORAL EVERY 12 HOURS SCHEDULED
Status: DISCONTINUED | OUTPATIENT
Start: 2020-01-01 | End: 2020-01-01

## 2020-01-01 RX ORDER — LIDOCAINE HYDROCHLORIDE ANHYDROUS AND DEXTROSE MONOHYDRATE .8; 5 G/100ML; G/100ML
INJECTION, SOLUTION INTRAVENOUS CONTINUOUS PRN
Status: DISCONTINUED | OUTPATIENT
Start: 2020-01-01 | End: 2020-01-01 | Stop reason: SURG

## 2020-01-01 RX ORDER — FAMOTIDINE 10 MG/ML
20 INJECTION, SOLUTION INTRAVENOUS 2 TIMES DAILY
Status: DISCONTINUED | OUTPATIENT
Start: 2020-01-01 | End: 2020-01-01

## 2020-01-01 RX ORDER — HYDROCODONE BITARTRATE AND ACETAMINOPHEN 10; 325 MG/1; MG/1
1-2 TABLET ORAL EVERY 4 HOURS PRN
Status: DISCONTINUED | OUTPATIENT
Start: 2020-01-01 | End: 2020-01-01

## 2020-01-01 RX ORDER — AMLODIPINE BESYLATE 10 MG/1
10 TABLET ORAL DAILY
Status: DISCONTINUED | OUTPATIENT
Start: 2020-01-01 | End: 2020-01-01

## 2020-01-01 RX ORDER — ONDANSETRON 2 MG/ML
8 INJECTION INTRAMUSCULAR; INTRAVENOUS EVERY 6 HOURS SCHEDULED
Status: DISCONTINUED | OUTPATIENT
Start: 2020-01-01 | End: 2020-01-01

## 2020-01-01 RX ORDER — FENTANYL 25 UG/H
1 PATCH TRANSDERMAL
Status: DISCONTINUED | OUTPATIENT
Start: 2020-01-01 | End: 2020-01-01

## 2020-01-01 RX ORDER — CYCLOBENZAPRINE HCL 10 MG
10 TABLET ORAL 3 TIMES DAILY PRN
Qty: 20 TABLET | Refills: 0 | Status: SHIPPED | OUTPATIENT
Start: 2020-01-01

## 2020-01-01 RX ORDER — ALBUMIN, HUMAN INJ 5% 5 %
SOLUTION INTRAVENOUS CONTINUOUS PRN
Status: DISCONTINUED | OUTPATIENT
Start: 2020-01-01 | End: 2020-01-01 | Stop reason: SURG

## 2020-01-01 RX ORDER — CYCLOBENZAPRINE HCL 10 MG
10 TABLET ORAL 3 TIMES DAILY PRN
Status: DISCONTINUED | OUTPATIENT
Start: 2020-01-01 | End: 2020-01-01

## 2020-01-01 RX ORDER — FENTANYL 50 UG/H
1 PATCH TRANSDERMAL
Status: DISCONTINUED | OUTPATIENT
Start: 2020-01-01 | End: 2020-01-01

## 2020-01-01 RX ORDER — SENNA AND DOCUSATE SODIUM 50; 8.6 MG/1; MG/1
4 TABLET, FILM COATED ORAL 2 TIMES DAILY
Status: DISCONTINUED | OUTPATIENT
Start: 2020-01-01 | End: 2020-01-01

## 2020-01-01 RX ORDER — AMLODIPINE BESYLATE 10 MG/1
TABLET ORAL
Qty: 90 TABLET | Refills: 1 | Status: SHIPPED | OUTPATIENT
Start: 2020-01-01

## 2020-01-01 RX ORDER — POTASSIUM CHLORIDE 20 MEQ/1
40 TABLET, EXTENDED RELEASE ORAL ONCE
Status: CANCELLED
Start: 2020-01-01

## 2020-01-01 RX ORDER — MORPHINE SULFATE 4 MG/ML
4 INJECTION, SOLUTION INTRAMUSCULAR; INTRAVENOUS ONCE
Status: COMPLETED | OUTPATIENT
Start: 2020-01-01 | End: 2020-01-01

## 2020-01-01 RX ORDER — 0.9 % SODIUM CHLORIDE 0.9 %
10 VIAL (ML) INJECTION ONCE
Status: DISCONTINUED | OUTPATIENT
Start: 2020-01-01 | End: 2020-01-01

## 2020-01-01 RX ORDER — OXYCODONE HYDROCHLORIDE 5 MG/1
5 TABLET ORAL EVERY 4 HOURS PRN
Qty: 15 TABLET | Refills: 0 | Status: SHIPPED | OUTPATIENT
Start: 2020-01-01 | End: 2020-01-01 | Stop reason: ALTCHOICE

## 2020-01-01 RX ORDER — OCTREOTIDE ACETATE 100 UG/ML
100 INJECTION, SOLUTION INTRAVENOUS; SUBCUTANEOUS EVERY 8 HOURS
Status: DISCONTINUED | OUTPATIENT
Start: 2020-01-01 | End: 2020-01-01

## 2020-01-01 RX ORDER — MORPHINE SULFATE 4 MG/ML
INJECTION, SOLUTION INTRAMUSCULAR; INTRAVENOUS
Status: COMPLETED
Start: 2020-01-01 | End: 2020-01-01

## 2020-01-01 RX ORDER — AMOXICILLIN AND CLAVULANATE POTASSIUM 875; 125 MG/1; MG/1
1 TABLET, FILM COATED ORAL EVERY 12 HOURS SCHEDULED
Qty: 8 TABLET | Refills: 0 | Status: SHIPPED | OUTPATIENT
Start: 2020-01-01 | End: 2020-01-01 | Stop reason: ALTCHOICE

## 2020-01-01 RX ORDER — HYDROCODONE BITARTRATE AND ACETAMINOPHEN 7.5; 325 MG/1; MG/1
1 TABLET ORAL EVERY 6 HOURS PRN
Qty: 40 TABLET | Refills: 0 | Status: SHIPPED | OUTPATIENT
Start: 2020-01-01 | End: 2020-01-01 | Stop reason: ALTCHOICE

## 2020-01-01 RX ORDER — SODIUM CHLORIDE 9 MG/ML
INJECTION INTRAVENOUS
Status: COMPLETED
Start: 2020-01-01 | End: 2020-01-01

## 2020-01-01 RX ORDER — ONDANSETRON 4 MG/1
8 TABLET, ORALLY DISINTEGRATING ORAL EVERY 6 HOURS
Status: DISCONTINUED | OUTPATIENT
Start: 2020-01-01 | End: 2020-01-01

## 2020-01-01 RX ORDER — ACETAMINOPHEN 500 MG
1000 TABLET ORAL EVERY 6 HOURS PRN
Qty: 30 TABLET | Refills: 0 | Status: SHIPPED | OUTPATIENT
Start: 2020-01-01 | End: 2020-01-01

## 2020-01-01 RX ORDER — POTASSIUM CHLORIDE 1500 MG/1
40 TABLET, EXTENDED RELEASE ORAL DAILY
Qty: 60 TABLET | Refills: 3 | Status: CANCELLED | OUTPATIENT
Start: 2020-01-01

## 2020-01-01 RX ORDER — DEXAMETHASONE SODIUM PHOSPHATE 4 MG/ML
VIAL (ML) INJECTION AS NEEDED
Status: DISCONTINUED | OUTPATIENT
Start: 2020-01-01 | End: 2020-01-01 | Stop reason: SURG

## 2020-01-01 RX ORDER — ACETAMINOPHEN 500 MG
500 TABLET ORAL EVERY 6 HOURS PRN
Status: DISCONTINUED | OUTPATIENT
Start: 2020-01-01 | End: 2020-01-01

## 2020-01-01 RX ORDER — HYDROCODONE BITARTRATE AND ACETAMINOPHEN 10; 325 MG/1; MG/1
1-2 TABLET ORAL EVERY 4 HOURS PRN
Qty: 150 TABLET | Refills: 0 | Status: SHIPPED | OUTPATIENT
Start: 2020-01-01

## 2020-01-01 RX ORDER — PREGABALIN 75 MG/1
75 CAPSULE ORAL 2 TIMES DAILY
Qty: 60 CAPSULE | Refills: 3 | Status: ON HOLD | OUTPATIENT
Start: 2020-01-01 | End: 2020-01-01

## 2020-01-01 RX ORDER — METOCLOPRAMIDE HYDROCHLORIDE 5 MG/ML
10 INJECTION INTRAMUSCULAR; INTRAVENOUS
Status: DISCONTINUED | OUTPATIENT
Start: 2020-01-01 | End: 2020-01-01

## 2020-01-01 RX ORDER — MORPHINE SULFATE 10 MG/ML
6 INJECTION, SOLUTION INTRAMUSCULAR; INTRAVENOUS EVERY 10 MIN PRN
Status: DISCONTINUED | OUTPATIENT
Start: 2020-01-01 | End: 2020-01-01 | Stop reason: HOSPADM

## 2020-01-01 RX ORDER — ALBUMIN, HUMAN INJ 5% 5 %
500 SOLUTION INTRAVENOUS ONCE
Status: COMPLETED | OUTPATIENT
Start: 2020-01-01 | End: 2020-01-01

## 2020-01-01 RX ORDER — ENOXAPARIN SODIUM 100 MG/ML
40 INJECTION SUBCUTANEOUS DAILY
Qty: 22 SYRINGE | Refills: 0 | Status: SHIPPED | OUTPATIENT
Start: 2020-01-01 | End: 2020-01-01 | Stop reason: ALTCHOICE

## 2020-01-01 RX ORDER — DEXTROSE, SODIUM CHLORIDE, AND POTASSIUM CHLORIDE 5; .45; .15 G/100ML; G/100ML; G/100ML
INJECTION INTRAVENOUS CONTINUOUS
Status: DISCONTINUED | OUTPATIENT
Start: 2020-01-01 | End: 2020-01-01

## 2020-01-01 RX ORDER — DIPHENHYDRAMINE HCL 25 MG
25 CAPSULE ORAL EVERY 4 HOURS PRN
Status: ACTIVE | OUTPATIENT
Start: 2020-01-01 | End: 2020-01-01

## 2020-01-01 RX ORDER — MORPHINE SULFATE 4 MG/ML
4 INJECTION, SOLUTION INTRAMUSCULAR; INTRAVENOUS EVERY 10 MIN PRN
Status: DISCONTINUED | OUTPATIENT
Start: 2020-01-01 | End: 2020-01-01 | Stop reason: HOSPADM

## 2020-01-01 RX ORDER — POTASSIUM CHLORIDE 20 MEQ/1
40 TABLET, EXTENDED RELEASE ORAL EVERY 4 HOURS
Status: COMPLETED | OUTPATIENT
Start: 2020-01-01 | End: 2020-01-01

## 2020-01-01 RX ORDER — DIPHENHYDRAMINE HYDROCHLORIDE 50 MG/ML
12.5 INJECTION INTRAMUSCULAR; INTRAVENOUS EVERY 4 HOURS PRN
Status: DISCONTINUED | OUTPATIENT
Start: 2020-01-01 | End: 2020-01-01

## 2020-01-01 RX ADMIN — SODIUM CHLORIDE: 9 INJECTION, SOLUTION INTRAVENOUS at 07:46:00

## 2020-01-01 RX ADMIN — FLUOROURACIL 5800 MG: 50 INJECTION, SOLUTION INTRAVENOUS at 12:53:00

## 2020-01-01 RX ADMIN — ONDANSETRON 4 MG: 2 INJECTION INTRAMUSCULAR; INTRAVENOUS at 13:26:00

## 2020-01-01 RX ADMIN — HEPARIN SODIUM (PORCINE) LOCK FLUSH IV SOLN 100 UNIT/ML 500 UNITS: 100 SOLUTION INTRAVENOUS at 10:24:00

## 2020-01-01 RX ADMIN — ONDANSETRON 4 MG: 2 INJECTION INTRAMUSCULAR; INTRAVENOUS at 07:34:00

## 2020-01-01 RX ADMIN — ROCURONIUM BROMIDE 30 MG: 10 INJECTION, SOLUTION INTRAVENOUS at 09:12:00

## 2020-01-01 RX ADMIN — ATROPINE SULFATE 0.2 MG: 0.4 MG/ML AMPUL (ML) INJECTION at 09:25:00

## 2020-01-01 RX ADMIN — HEPARIN SODIUM (PORCINE) LOCK FLUSH IV SOLN 100 UNIT/ML 500 UNITS: 100 SOLUTION INTRAVENOUS at 11:15:00

## 2020-01-01 RX ADMIN — FLUOROURACIL 5750 MG: 50 INJECTION, SOLUTION INTRAVENOUS at 11:25:00

## 2020-01-01 RX ADMIN — HEPARIN SODIUM (PORCINE) LOCK FLUSH IV SOLN 100 UNIT/ML 500 UNITS: 100 SOLUTION INTRAVENOUS at 14:23:00

## 2020-01-01 RX ADMIN — PHENYLEPHRINE HCL 100 MCG: 10 MG/ML VIAL (ML) INJECTION at 08:28:00

## 2020-01-01 RX ADMIN — FLUOROURACIL 5750 MG: 50 INJECTION, SOLUTION INTRAVENOUS at 11:48:00

## 2020-01-01 RX ADMIN — ROCURONIUM BROMIDE 10 MG: 10 INJECTION, SOLUTION INTRAVENOUS at 11:56:00

## 2020-01-01 RX ADMIN — HEPARIN SODIUM (PORCINE) LOCK FLUSH IV SOLN 100 UNIT/ML 500 UNITS: 100 SOLUTION INTRAVENOUS at 09:49:00

## 2020-01-01 RX ADMIN — ROCURONIUM BROMIDE 10 MG: 10 INJECTION, SOLUTION INTRAVENOUS at 11:15:00

## 2020-01-01 RX ADMIN — MIDAZOLAM HYDROCHLORIDE 2 MG: 1 INJECTION INTRAMUSCULAR; INTRAVENOUS at 07:34:00

## 2020-01-01 RX ADMIN — LIDOCAINE HYDROCHLORIDE 50 MG: 10 INJECTION, SOLUTION EPIDURAL; INFILTRATION; INTRACAUDAL; PERINEURAL at 07:41:00

## 2020-01-01 RX ADMIN — ATROPINE SULFATE 0.2 MG: 0.4 MG/ML AMPUL (ML) INJECTION at 09:49:00

## 2020-01-01 RX ADMIN — ROCURONIUM BROMIDE 10 MG: 10 INJECTION, SOLUTION INTRAVENOUS at 07:41:00

## 2020-01-01 RX ADMIN — ATROPINE SULFATE 0.2 MG: 0.4 MG/ML AMPUL (ML) INJECTION at 10:56:00

## 2020-01-01 RX ADMIN — HEPARIN SODIUM (PORCINE) LOCK FLUSH IV SOLN 100 UNIT/ML 500 UNITS: 100 SOLUTION INTRAVENOUS at 09:55:00

## 2020-01-01 RX ADMIN — LIDOCAINE HYDROCHLORIDE ANHYDROUS AND DEXTROSE MONOHYDRATE 2 MG/MIN: .8; 5 INJECTION, SOLUTION INTRAVENOUS at 07:48:00

## 2020-01-01 RX ADMIN — LIDOCAINE HYDROCHLORIDE 30 MG: 10 INJECTION, SOLUTION EPIDURAL; INFILTRATION; INTRACAUDAL; PERINEURAL at 07:38:00

## 2020-01-01 RX ADMIN — SODIUM CHLORIDE, SODIUM LACTATE, POTASSIUM CHLORIDE, CALCIUM CHLORIDE: 600; 310; 30; 20 INJECTION, SOLUTION INTRAVENOUS at 13:02:00

## 2020-01-01 RX ADMIN — PHENYLEPHRINE HCL 100 MCG: 10 MG/ML VIAL (ML) INJECTION at 08:26:00

## 2020-01-01 RX ADMIN — FLUOROURACIL 1000 MG: 50 INJECTION, SOLUTION INTRAVENOUS at 11:44:00

## 2020-01-01 RX ADMIN — DEXAMETHASONE SODIUM PHOSPHATE 4 MG: 4 MG/ML VIAL (ML) INJECTION at 07:34:00

## 2020-01-01 RX ADMIN — LIDOCAINE HYDROCHLORIDE ANHYDROUS AND DEXTROSE MONOHYDRATE 2 MG/MIN: .8; 5 INJECTION, SOLUTION INTRAVENOUS at 09:20:00

## 2020-01-01 RX ADMIN — ATROPINE SULFATE 0.2 MG: 0.4 MG/ML AMPUL (ML) INJECTION at 09:37:00

## 2020-01-01 RX ADMIN — ALBUMIN, HUMAN INJ 5%: 5 SOLUTION INTRAVENOUS at 11:49:00

## 2020-01-01 RX ADMIN — MORPHINE SULFATE 0.4 MG: 1 INJECTION, SOLUTION EPIDURAL; INTRATHECAL; INTRAVENOUS at 07:38:00

## 2020-01-01 RX ADMIN — FLUOROURACIL 6000 MG: 50 INJECTION, SOLUTION INTRAVENOUS at 11:50:00

## 2020-01-01 RX ADMIN — ROCURONIUM BROMIDE 40 MG: 10 INJECTION, SOLUTION INTRAVENOUS at 07:48:00

## 2020-01-01 RX ADMIN — EPHEDRINE SULFATE 10 MG: 50 INJECTION, SOLUTION INTRAVENOUS at 08:30:00

## 2020-01-01 RX ADMIN — BUPIVACAINE HYDROCHLORIDE 1.2 ML: 7.5 INJECTION, SOLUTION INTRASPINAL at 07:38:00

## 2020-01-01 RX ADMIN — HEPARIN SODIUM (PORCINE) LOCK FLUSH IV SOLN 100 UNIT/ML 500 UNITS: 100 SOLUTION INTRAVENOUS at 09:45:00

## 2020-01-01 RX ADMIN — PHENYLEPHRINE HCL 100 MCG: 10 MG/ML VIAL (ML) INJECTION at 08:30:00

## 2020-01-02 ENCOUNTER — LAB ENCOUNTER (OUTPATIENT)
Dept: LAB | Facility: HOSPITAL | Age: 55
End: 2020-01-02
Attending: SURGERY
Payer: COMMERCIAL

## 2020-01-02 DIAGNOSIS — Z01.818 PREOP TESTING: ICD-10-CM

## 2020-01-02 LAB
ALBUMIN SERPL-MCNC: 3.5 G/DL (ref 3.4–5)
ALBUMIN/GLOB SERPL: 0.9 {RATIO} (ref 1–2)
ALP LIVER SERPL-CCNC: 96 U/L (ref 45–117)
ALT SERPL-CCNC: 30 U/L (ref 16–61)
ANION GAP SERPL CALC-SCNC: 4 MMOL/L (ref 0–18)
ANTIBODY SCREEN: NEGATIVE
AST SERPL-CCNC: 28 U/L (ref 15–37)
BASOPHILS # BLD AUTO: 0.02 X10(3) UL (ref 0–0.2)
BASOPHILS NFR BLD AUTO: 0.4 %
BILIRUB SERPL-MCNC: 0.2 MG/DL (ref 0.1–2)
BUN BLD-MCNC: 17 MG/DL (ref 7–18)
BUN/CREAT SERPL: 15.7 (ref 10–20)
CALCIUM BLD-MCNC: 9 MG/DL (ref 8.5–10.1)
CHLORIDE SERPL-SCNC: 107 MMOL/L (ref 98–112)
CO2 SERPL-SCNC: 29 MMOL/L (ref 21–32)
CREAT BLD-MCNC: 1.08 MG/DL (ref 0.7–1.3)
DEPRECATED RDW RBC AUTO: 45.5 FL (ref 35.1–46.3)
EOSINOPHIL # BLD AUTO: 0.05 X10(3) UL (ref 0–0.7)
EOSINOPHIL NFR BLD AUTO: 1 %
ERYTHROCYTE [DISTWIDTH] IN BLOOD BY AUTOMATED COUNT: 14 % (ref 11–15)
GLOBULIN PLAS-MCNC: 4.1 G/DL (ref 2.8–4.4)
GLUCOSE BLD-MCNC: 109 MG/DL (ref 70–99)
HCT VFR BLD AUTO: 43 % (ref 39–53)
HGB BLD-MCNC: 13.3 G/DL (ref 13–17.5)
IMM GRANULOCYTES # BLD AUTO: 0.02 X10(3) UL (ref 0–1)
IMM GRANULOCYTES NFR BLD: 0.4 %
LYMPHOCYTES # BLD AUTO: 1.93 X10(3) UL (ref 1–4)
LYMPHOCYTES NFR BLD AUTO: 39.3 %
M PROTEIN MFR SERPL ELPH: 7.6 G/DL (ref 6.4–8.2)
MCH RBC QN AUTO: 27.8 PG (ref 26–34)
MCHC RBC AUTO-ENTMCNC: 30.9 G/DL (ref 31–37)
MCV RBC AUTO: 90 FL (ref 80–100)
MONOCYTES # BLD AUTO: 0.41 X10(3) UL (ref 0.1–1)
MONOCYTES NFR BLD AUTO: 8.4 %
NEUTROPHILS # BLD AUTO: 2.48 X10 (3) UL (ref 1.5–7.7)
NEUTROPHILS # BLD AUTO: 2.48 X10(3) UL (ref 1.5–7.7)
NEUTROPHILS NFR BLD AUTO: 50.5 %
OSMOLALITY SERPL CALC.SUM OF ELEC: 292 MOSM/KG (ref 275–295)
PATIENT FASTING Y/N/NP: NO
PLATELET # BLD AUTO: 214 10(3)UL (ref 150–450)
POTASSIUM SERPL-SCNC: 3.8 MMOL/L (ref 3.5–5.1)
RBC # BLD AUTO: 4.78 X10(6)UL (ref 4.3–5.7)
RH BLOOD TYPE: NEGATIVE
SODIUM SERPL-SCNC: 140 MMOL/L (ref 136–145)
WBC # BLD AUTO: 4.9 X10(3) UL (ref 4–11)

## 2020-01-02 PROCEDURE — 86900 BLOOD TYPING SEROLOGIC ABO: CPT

## 2020-01-02 PROCEDURE — 86901 BLOOD TYPING SEROLOGIC RH(D): CPT

## 2020-01-02 PROCEDURE — 86850 RBC ANTIBODY SCREEN: CPT

## 2020-01-02 PROCEDURE — 80053 COMPREHEN METABOLIC PANEL: CPT | Performed by: SURGERY

## 2020-01-02 PROCEDURE — 36415 COLL VENOUS BLD VENIPUNCTURE: CPT

## 2020-01-02 PROCEDURE — 85025 COMPLETE CBC W/AUTO DIFF WBC: CPT | Performed by: SURGERY

## 2020-01-06 ENCOUNTER — HOSPITAL ENCOUNTER (INPATIENT)
Facility: HOSPITAL | Age: 55
LOS: 3 days | Discharge: HOME | DRG: 330 | End: 2020-01-09
Attending: SURGERY | Admitting: SURGERY
Payer: COMMERCIAL

## 2020-01-06 ENCOUNTER — ANESTHESIA (OUTPATIENT)
Dept: SURGERY | Facility: HOSPITAL | Age: 55
DRG: 330 | End: 2020-01-06
Payer: COMMERCIAL

## 2020-01-06 ENCOUNTER — ANESTHESIA EVENT (OUTPATIENT)
Dept: SURGERY | Facility: HOSPITAL | Age: 55
DRG: 330 | End: 2020-01-06
Payer: COMMERCIAL

## 2020-01-06 DIAGNOSIS — C18.6 MALIGNANT NEOPLASM OF DESCENDING COLON (HCC): ICD-10-CM

## 2020-01-06 DIAGNOSIS — Z01.818 PREOP TESTING: Primary | ICD-10-CM

## 2020-01-06 DIAGNOSIS — C78.7 LIVER METASTASIS (HCC): ICD-10-CM

## 2020-01-06 PROCEDURE — 44204 LAPARO PARTIAL COLECTOMY: CPT | Performed by: SURGERY

## 2020-01-06 PROCEDURE — 0DBM4ZZ EXCISION OF DESCENDING COLON, PERCUTANEOUS ENDOSCOPIC APPROACH: ICD-10-PCS | Performed by: SURGERY

## 2020-01-06 PROCEDURE — 0DBL4ZZ EXCISION OF TRANSVERSE COLON, PERCUTANEOUS ENDOSCOPIC APPROACH: ICD-10-PCS | Performed by: SURGERY

## 2020-01-06 PROCEDURE — 99232 SBSQ HOSP IP/OBS MODERATE 35: CPT | Performed by: HOSPITALIST

## 2020-01-06 PROCEDURE — 0DBU4ZZ EXCISION OF OMENTUM, PERCUTANEOUS ENDOSCOPIC APPROACH: ICD-10-PCS | Performed by: SURGERY

## 2020-01-06 PROCEDURE — 0WQF0ZZ REPAIR ABDOMINAL WALL, OPEN APPROACH: ICD-10-PCS | Performed by: SURGERY

## 2020-01-06 PROCEDURE — 44213 LAP MOBIL SPLENIC FL ADD-ON: CPT | Performed by: SURGERY

## 2020-01-06 RX ORDER — ACETAMINOPHEN 500 MG
1000 TABLET ORAL ONCE
Status: COMPLETED | OUTPATIENT
Start: 2020-01-06 | End: 2020-01-06

## 2020-01-06 RX ORDER — METOCLOPRAMIDE HYDROCHLORIDE 5 MG/ML
10 INJECTION INTRAMUSCULAR; INTRAVENOUS EVERY 6 HOURS PRN
Status: DISCONTINUED | OUTPATIENT
Start: 2020-01-06 | End: 2020-01-09

## 2020-01-06 RX ORDER — AMLODIPINE BESYLATE 5 MG/1
5 TABLET ORAL
Status: DISCONTINUED | OUTPATIENT
Start: 2020-01-07 | End: 2020-01-09

## 2020-01-06 RX ORDER — HEPARIN SODIUM 5000 [USP'U]/ML
5000 INJECTION, SOLUTION INTRAVENOUS; SUBCUTANEOUS EVERY 12 HOURS SCHEDULED
Status: DISCONTINUED | OUTPATIENT
Start: 2020-01-06 | End: 2020-01-09

## 2020-01-06 RX ORDER — BUPIVACAINE HYDROCHLORIDE 5 MG/ML
INJECTION, SOLUTION EPIDURAL; INTRACAUDAL AS NEEDED
Status: DISCONTINUED | OUTPATIENT
Start: 2020-01-06 | End: 2020-01-06 | Stop reason: HOSPADM

## 2020-01-06 RX ORDER — MORPHINE SULFATE 4 MG/ML
4 INJECTION, SOLUTION INTRAMUSCULAR; INTRAVENOUS EVERY 10 MIN PRN
Status: DISCONTINUED | OUTPATIENT
Start: 2020-01-06 | End: 2020-01-06 | Stop reason: HOSPADM

## 2020-01-06 RX ORDER — PHENYLEPHRINE HCL 10 MG/ML
VIAL (ML) INJECTION AS NEEDED
Status: DISCONTINUED | OUTPATIENT
Start: 2020-01-06 | End: 2020-01-06 | Stop reason: SURG

## 2020-01-06 RX ORDER — KETAMINE HYDROCHLORIDE 50 MG/ML
INJECTION, SOLUTION, CONCENTRATE INTRAMUSCULAR; INTRAVENOUS AS NEEDED
Status: DISCONTINUED | OUTPATIENT
Start: 2020-01-06 | End: 2020-01-06 | Stop reason: SURG

## 2020-01-06 RX ORDER — MORPHINE SULFATE 4 MG/ML
4 INJECTION, SOLUTION INTRAMUSCULAR; INTRAVENOUS EVERY 2 HOUR PRN
Status: DISCONTINUED | OUTPATIENT
Start: 2020-01-06 | End: 2020-01-09

## 2020-01-06 RX ORDER — PROCHLORPERAZINE EDISYLATE 5 MG/ML
5 INJECTION INTRAMUSCULAR; INTRAVENOUS ONCE AS NEEDED
Status: DISCONTINUED | OUTPATIENT
Start: 2020-01-06 | End: 2020-01-06 | Stop reason: HOSPADM

## 2020-01-06 RX ORDER — KETOROLAC TROMETHAMINE 30 MG/ML
30 INJECTION, SOLUTION INTRAMUSCULAR; INTRAVENOUS EVERY 6 HOURS
Status: COMPLETED | OUTPATIENT
Start: 2020-01-06 | End: 2020-01-08

## 2020-01-06 RX ORDER — METOCLOPRAMIDE 10 MG/1
10 TABLET ORAL ONCE
Status: COMPLETED | OUTPATIENT
Start: 2020-01-06 | End: 2020-01-06

## 2020-01-06 RX ORDER — HYDROMORPHONE HYDROCHLORIDE 1 MG/ML
0.4 INJECTION, SOLUTION INTRAMUSCULAR; INTRAVENOUS; SUBCUTANEOUS EVERY 5 MIN PRN
Status: DISCONTINUED | OUTPATIENT
Start: 2020-01-06 | End: 2020-01-06 | Stop reason: HOSPADM

## 2020-01-06 RX ORDER — MORPHINE SULFATE 10 MG/ML
6 INJECTION, SOLUTION INTRAMUSCULAR; INTRAVENOUS EVERY 10 MIN PRN
Status: DISCONTINUED | OUTPATIENT
Start: 2020-01-06 | End: 2020-01-06 | Stop reason: HOSPADM

## 2020-01-06 RX ORDER — HYDROMORPHONE HYDROCHLORIDE 1 MG/ML
0.2 INJECTION, SOLUTION INTRAMUSCULAR; INTRAVENOUS; SUBCUTANEOUS EVERY 5 MIN PRN
Status: DISCONTINUED | OUTPATIENT
Start: 2020-01-06 | End: 2020-01-06 | Stop reason: HOSPADM

## 2020-01-06 RX ORDER — LIDOCAINE HYDROCHLORIDE ANHYDROUS AND DEXTROSE MONOHYDRATE .8; 5 G/100ML; G/100ML
INJECTION, SOLUTION INTRAVENOUS CONTINUOUS PRN
Status: DISCONTINUED | OUTPATIENT
Start: 2020-01-06 | End: 2020-01-06 | Stop reason: SURG

## 2020-01-06 RX ORDER — SODIUM CHLORIDE, SODIUM LACTATE, POTASSIUM CHLORIDE, CALCIUM CHLORIDE 600; 310; 30; 20 MG/100ML; MG/100ML; MG/100ML; MG/100ML
INJECTION, SOLUTION INTRAVENOUS CONTINUOUS
Status: DISCONTINUED | OUTPATIENT
Start: 2020-01-06 | End: 2020-01-09

## 2020-01-06 RX ORDER — METRONIDAZOLE 500 MG/100ML
500 INJECTION, SOLUTION INTRAVENOUS ONCE
Status: COMPLETED | OUTPATIENT
Start: 2020-01-06 | End: 2020-01-06

## 2020-01-06 RX ORDER — ROCURONIUM BROMIDE 10 MG/ML
INJECTION, SOLUTION INTRAVENOUS AS NEEDED
Status: DISCONTINUED | OUTPATIENT
Start: 2020-01-06 | End: 2020-01-06 | Stop reason: SURG

## 2020-01-06 RX ORDER — LIDOCAINE HYDROCHLORIDE 10 MG/ML
INJECTION, SOLUTION EPIDURAL; INFILTRATION; INTRACAUDAL; PERINEURAL AS NEEDED
Status: DISCONTINUED | OUTPATIENT
Start: 2020-01-06 | End: 2020-01-06 | Stop reason: SURG

## 2020-01-06 RX ORDER — ONDANSETRON 2 MG/ML
4 INJECTION INTRAMUSCULAR; INTRAVENOUS ONCE AS NEEDED
Status: DISCONTINUED | OUTPATIENT
Start: 2020-01-06 | End: 2020-01-06 | Stop reason: HOSPADM

## 2020-01-06 RX ORDER — NALOXONE HYDROCHLORIDE 0.4 MG/ML
80 INJECTION, SOLUTION INTRAMUSCULAR; INTRAVENOUS; SUBCUTANEOUS AS NEEDED
Status: DISCONTINUED | OUTPATIENT
Start: 2020-01-06 | End: 2020-01-06 | Stop reason: HOSPADM

## 2020-01-06 RX ORDER — MORPHINE SULFATE 2 MG/ML
2 INJECTION, SOLUTION INTRAMUSCULAR; INTRAVENOUS EVERY 2 HOUR PRN
Status: DISCONTINUED | OUTPATIENT
Start: 2020-01-06 | End: 2020-01-09

## 2020-01-06 RX ORDER — MIDAZOLAM HYDROCHLORIDE 1 MG/ML
INJECTION INTRAMUSCULAR; INTRAVENOUS AS NEEDED
Status: DISCONTINUED | OUTPATIENT
Start: 2020-01-06 | End: 2020-01-06 | Stop reason: SURG

## 2020-01-06 RX ORDER — GABAPENTIN 400 MG/1
800 CAPSULE ORAL 3 TIMES DAILY
Status: DISCONTINUED | OUTPATIENT
Start: 2020-01-06 | End: 2020-01-09

## 2020-01-06 RX ORDER — HYDROCODONE BITARTRATE AND ACETAMINOPHEN 5; 325 MG/1; MG/1
1 TABLET ORAL AS NEEDED
Status: DISCONTINUED | OUTPATIENT
Start: 2020-01-06 | End: 2020-01-06 | Stop reason: HOSPADM

## 2020-01-06 RX ORDER — FAMOTIDINE 20 MG/1
20 TABLET ORAL ONCE
Status: COMPLETED | OUTPATIENT
Start: 2020-01-06 | End: 2020-01-06

## 2020-01-06 RX ORDER — PANTOPRAZOLE SODIUM 40 MG/1
40 TABLET, DELAYED RELEASE ORAL
Status: DISCONTINUED | OUTPATIENT
Start: 2020-01-07 | End: 2020-01-09

## 2020-01-06 RX ORDER — MORPHINE SULFATE 4 MG/ML
8 INJECTION, SOLUTION INTRAMUSCULAR; INTRAVENOUS EVERY 2 HOUR PRN
Status: DISCONTINUED | OUTPATIENT
Start: 2020-01-06 | End: 2020-01-09

## 2020-01-06 RX ORDER — ONDANSETRON 2 MG/ML
4 INJECTION INTRAMUSCULAR; INTRAVENOUS EVERY 6 HOURS PRN
Status: DISCONTINUED | OUTPATIENT
Start: 2020-01-06 | End: 2020-01-09

## 2020-01-06 RX ORDER — SODIUM CHLORIDE, SODIUM LACTATE, POTASSIUM CHLORIDE, CALCIUM CHLORIDE 600; 310; 30; 20 MG/100ML; MG/100ML; MG/100ML; MG/100ML
INJECTION, SOLUTION INTRAVENOUS CONTINUOUS
Status: DISCONTINUED | OUTPATIENT
Start: 2020-01-06 | End: 2020-01-06 | Stop reason: HOSPADM

## 2020-01-06 RX ORDER — HYDROCODONE BITARTRATE AND ACETAMINOPHEN 5; 325 MG/1; MG/1
2 TABLET ORAL AS NEEDED
Status: DISCONTINUED | OUTPATIENT
Start: 2020-01-06 | End: 2020-01-06 | Stop reason: HOSPADM

## 2020-01-06 RX ORDER — SODIUM CHLORIDE, SODIUM LACTATE, POTASSIUM CHLORIDE, CALCIUM CHLORIDE 600; 310; 30; 20 MG/100ML; MG/100ML; MG/100ML; MG/100ML
INJECTION, SOLUTION INTRAVENOUS CONTINUOUS
Status: DISCONTINUED | OUTPATIENT
Start: 2020-01-06 | End: 2020-01-08

## 2020-01-06 RX ORDER — HYDROMORPHONE HYDROCHLORIDE 1 MG/ML
0.6 INJECTION, SOLUTION INTRAMUSCULAR; INTRAVENOUS; SUBCUTANEOUS EVERY 5 MIN PRN
Status: DISCONTINUED | OUTPATIENT
Start: 2020-01-06 | End: 2020-01-06 | Stop reason: HOSPADM

## 2020-01-06 RX ORDER — MORPHINE SULFATE 4 MG/ML
2 INJECTION, SOLUTION INTRAMUSCULAR; INTRAVENOUS EVERY 10 MIN PRN
Status: DISCONTINUED | OUTPATIENT
Start: 2020-01-06 | End: 2020-01-06 | Stop reason: HOSPADM

## 2020-01-06 RX ADMIN — SODIUM CHLORIDE, SODIUM LACTATE, POTASSIUM CHLORIDE, CALCIUM CHLORIDE: 600; 310; 30; 20 INJECTION, SOLUTION INTRAVENOUS at 11:48:00

## 2020-01-06 RX ADMIN — ROCURONIUM BROMIDE 40 MG: 10 INJECTION, SOLUTION INTRAVENOUS at 09:00:00

## 2020-01-06 RX ADMIN — METRONIDAZOLE 500 MG: 500 INJECTION, SOLUTION INTRAVENOUS at 09:00:00

## 2020-01-06 RX ADMIN — ROCURONIUM BROMIDE 10 MG: 10 INJECTION, SOLUTION INTRAVENOUS at 08:44:00

## 2020-01-06 RX ADMIN — LIDOCAINE HYDROCHLORIDE ANHYDROUS AND DEXTROSE MONOHYDRATE 1 MG/MIN: .8; 5 INJECTION, SOLUTION INTRAVENOUS at 09:08:00

## 2020-01-06 RX ADMIN — ROCURONIUM BROMIDE 10 MG: 10 INJECTION, SOLUTION INTRAVENOUS at 09:45:00

## 2020-01-06 RX ADMIN — LIDOCAINE HYDROCHLORIDE 50 MG: 10 INJECTION, SOLUTION EPIDURAL; INFILTRATION; INTRACAUDAL; PERINEURAL at 08:44:00

## 2020-01-06 RX ADMIN — PHENYLEPHRINE HCL 100 MCG: 10 MG/ML VIAL (ML) INJECTION at 10:08:00

## 2020-01-06 RX ADMIN — MIDAZOLAM HYDROCHLORIDE 2 MG: 1 INJECTION INTRAMUSCULAR; INTRAVENOUS at 08:40:00

## 2020-01-06 RX ADMIN — ROCURONIUM BROMIDE 10 MG: 10 INJECTION, SOLUTION INTRAVENOUS at 10:48:00

## 2020-01-06 RX ADMIN — PHENYLEPHRINE HCL 50 MCG: 10 MG/ML VIAL (ML) INJECTION at 09:47:00

## 2020-01-06 RX ADMIN — KETAMINE HYDROCHLORIDE 70 MG: 50 INJECTION, SOLUTION, CONCENTRATE INTRAMUSCULAR; INTRAVENOUS at 09:03:00

## 2020-01-06 RX ADMIN — PHENYLEPHRINE HCL 50 MCG: 10 MG/ML VIAL (ML) INJECTION at 09:08:00

## 2020-01-06 RX ADMIN — PHENYLEPHRINE HCL 50 MCG: 10 MG/ML VIAL (ML) INJECTION at 09:40:00

## 2020-01-06 NOTE — OPERATIVE REPORT
Operative Report    Patient Name:  Brent De La Cruz  MR:  [id number]  :  1965  DOS:  20    Preop Dx: Malignant neoplasm of descending colon ([address] [C18.6]  Liver metastasis ([address] [C78.7]  Postop Dx:   Malignant neoplasm of descending colon ([address] liver showed a lesion on the medial lobe of the left liver as previously seen on preoperative imaging. No additional liver surface lesions were seen. Elevation of the omentum showed a 2 cm mass near the splenic flexure consistent with metastatic disease.

## 2020-01-06 NOTE — ANESTHESIA PREPROCEDURE EVALUATION
Anesthesia PreOp Note    HPI:     Lorena Diaz is a 47year old male who presents for preoperative consultation requested by: Marciano Rivera MD    Date of Surgery: 1/6/2020    Procedure(s):  LAPAROSCOPIC COLON RESECTION - LEFT  Indication: Malignant neoplasm INSERTION PORT-A-CATH Right 4/26/2019    Performed by Holli Amor MD at [facility] MAIN OR   • COLONOSCOPY     • COLONOSCOPY N/A 4/2/2019    Performed by Harriet Delgadillo MD at [facility] ENDOSCOPY   • PORT, INDWELLING, IMP Left    • [address] (COMPAZINE) 10 mg tablet, Take 1 tablet (10 mg total) by mouth every 6 (six) hours as needed for Nausea., Disp: 60 tablet, Rfl: 3, Unknown at Unknown time      lactated ringers infusion, , Intravenous, Continuous, Francisco Oden MD, Last Rate: 20 mL/hr at 01/0 on file    Relationships      Social connections:        Talks on phone: Not on file        Gets together: Not on file        Attends Mosque service: Not on file        Active member of club or organization: Not on file        Attends meetings of clubs height is 1.88 m (6' 2\") and weight is 132 kg (291 lb 1.6 oz). His oral temperature is 98.1 °F (36.7 °C). His blood pressure is 156/96 (abnormal) and his pulse is 82. His respiration is 18 and oxygen saturation is 97%.     12/30/19  1029 01/06/20  0719 01/

## 2020-01-06 NOTE — H&P
HP Update:    Pt seen and examined in the preoperative holding area. No significant clinical update noted. Plan to proceed with a laparoscopic left colectomy. All questions answered.       [facility] Surgery Resident

## 2020-01-06 NOTE — INTERVAL H&P NOTE
Pre-op Diagnosis: Malignant neoplasm of descending colon ([facility] [C18.6]  Liver metastasis ([facility] [address] [facility] [C78.7]    The above referenced H&P was reviewed by Heidi Jenkins MD on 1/6/2020, the patient was examined and no significant changes have occurred in the patient's

## 2020-01-06 NOTE — ANESTHESIA POSTPROCEDURE EVALUATION
Patient: Ronnald Canavan    Procedure Summary     Date:  01/06/20 Room / Location:  [facility] OR 02 / [facility] OR    Anesthesia Start:  0840 Anesthesia Stop:  0780    Procedure:  LAPAROSCOPIC COLON RESECTION - LEFT (Left Abdomen) Diagnosis:       Malignant n

## 2020-01-06 NOTE — ANESTHESIA PROCEDURE NOTES
Airway  Urgency: Elective      General Information and Staff    Patient location during procedure: OR  Anesthesiologist: Jackelin Mendoza MD  Resident/CRNA: Camille Sweeney CRNA  Performed: CRNA     Indications and Patient Condition  Indications for airway

## 2020-01-06 NOTE — PROGRESS NOTES
[facility] HOSP - [facility]    Progress Note    Sadie Gonzales Patient Status:  Surgery Admit - Inpt    1965 MRN [id number]   Location [address] UNIT Attending Martinez Whitt MD   Hosp Day # 0 PCP Arabella Juares MD HAD AMADOR ADJUNAT CHEMO.   OLIGO METS TOP LIVER, INITIALLY PLANNED FOR RESECTION, BUT NOW ROMMEL, PATH REPORT PENDING. Essential hypertension  CONT HOME MEDS, MONITOR.              Results:     Lab Results   Component Value Date    WBC 4.9 01/02/2020

## 2020-01-07 LAB
ANION GAP SERPL CALC-SCNC: 5 MMOL/L (ref 0–18)
BASOPHILS # BLD AUTO: 0.02 X10(3) UL (ref 0–0.2)
BASOPHILS NFR BLD AUTO: 0.3 %
BUN BLD-MCNC: 17 MG/DL (ref 7–18)
BUN/CREAT SERPL: 14 (ref 10–20)
CALCIUM BLD-MCNC: 7.6 MG/DL (ref 8.5–10.1)
CHLORIDE SERPL-SCNC: 108 MMOL/L (ref 98–112)
CO2 SERPL-SCNC: 27 MMOL/L (ref 21–32)
CREAT BLD-MCNC: 1.21 MG/DL (ref 0.7–1.3)
DEPRECATED RDW RBC AUTO: 47.2 FL (ref 35.1–46.3)
EOSINOPHIL # BLD AUTO: 0 X10(3) UL (ref 0–0.7)
EOSINOPHIL NFR BLD AUTO: 0 %
ERYTHROCYTE [DISTWIDTH] IN BLOOD BY AUTOMATED COUNT: 14.2 % (ref 11–15)
GLUCOSE BLD-MCNC: 104 MG/DL (ref 70–99)
HAV IGM SER QL: 2.3 MG/DL (ref 1.6–2.6)
HCT VFR BLD AUTO: 37.9 % (ref 39–53)
HGB BLD-MCNC: 11.9 G/DL (ref 13–17.5)
IMM GRANULOCYTES # BLD AUTO: 0.02 X10(3) UL (ref 0–1)
IMM GRANULOCYTES NFR BLD: 0.3 %
LYMPHOCYTES # BLD AUTO: 1.83 X10(3) UL (ref 1–4)
LYMPHOCYTES NFR BLD AUTO: 27.3 %
MCH RBC QN AUTO: 28.5 PG (ref 26–34)
MCHC RBC AUTO-ENTMCNC: 31.4 G/DL (ref 31–37)
MCV RBC AUTO: 90.9 FL (ref 80–100)
MONOCYTES # BLD AUTO: 0.61 X10(3) UL (ref 0.1–1)
MONOCYTES NFR BLD AUTO: 9.1 %
NEUTROPHILS # BLD AUTO: 4.23 X10 (3) UL (ref 1.5–7.7)
NEUTROPHILS # BLD AUTO: 4.23 X10(3) UL (ref 1.5–7.7)
NEUTROPHILS NFR BLD AUTO: 63 %
OSMOLALITY SERPL CALC.SUM OF ELEC: 292 MOSM/KG (ref 275–295)
PHOSPHATE SERPL-MCNC: 3 MG/DL (ref 2.5–4.9)
PLATELET # BLD AUTO: 184 10(3)UL (ref 150–450)
POTASSIUM SERPL-SCNC: 3.8 MMOL/L (ref 3.5–5.1)
RBC # BLD AUTO: 4.17 X10(6)UL (ref 4.3–5.7)
SODIUM SERPL-SCNC: 140 MMOL/L (ref 136–145)
WBC # BLD AUTO: 6.7 X10(3) UL (ref 4–11)

## 2020-01-07 PROCEDURE — 99233 SBSQ HOSP IP/OBS HIGH 50: CPT | Performed by: HOSPITALIST

## 2020-01-07 RX ORDER — POTASSIUM CHLORIDE 20 MEQ/1
40 TABLET, EXTENDED RELEASE ORAL ONCE
Status: COMPLETED | OUTPATIENT
Start: 2020-01-07 | End: 2020-01-07

## 2020-01-07 RX ORDER — 0.9 % SODIUM CHLORIDE 0.9 %
VIAL (ML) INJECTION
Status: COMPLETED
Start: 2020-01-07 | End: 2020-01-07

## 2020-01-07 RX ORDER — 0.9 % SODIUM CHLORIDE 0.9 %
VIAL (ML) INJECTION
Status: DISPENSED
Start: 2020-01-07 | End: 2020-01-07

## 2020-01-07 NOTE — RESPIRATORY THERAPY NOTE
Patient refused autopap therapy. [facility] has educated the patient on the purpose of and need for this therapy as well as potential negative outcomes associated with deferring treatment.  Despite education, patient maintains refusal. Pt states,he doesn't use cpa

## 2020-01-07 NOTE — PROGRESS NOTES
[facility]    Progress Note    [facility] Patient Status:  Inpatient    1965 MRN [id number]   Location [facility] 4W/SW/SE Attending Corry Hilliard MD   Hosp Day # 1 PCP Lisa Fernandez MD     Assessment and Plan:     P 850    Blood  --  20  --    Blood Output -- 20 --    Total Output -- 820 850       Net I/O     -- 4936 -109          Exam:   Gen: A&Ox3   CV: RRR   Pulm: No increased WOB   Abd: Soft, non-distended, appropriate tenderness, incisions with dermabond c/d/i

## 2020-01-07 NOTE — PLAN OF CARE
Plan of care reviewed with patient and family. Patient remains on IVF. NPO with sips of clears/ ice chips and tolerating well.  Voiding in urinal. Small amount of serosanguineous drainage noted to umbilicus incision- cleaned and covered with 2x2 and tagader

## 2020-01-07 NOTE — PLAN OF CARE
Problem: Patient Centered Care  Goal: Patient preferences are identified and integrated in the patient's plan of care  Description  Interventions:    - Provide timely, complete, and accurate information to patient/family  - Incorporate patient and family anxiety  - Utilize distraction and/or relaxation techniques  - Monitor for opioid side effects  - Notify MD/LIP if interventions unsuccessful or patient reports new pain  - Anticipate increased pain with activity and pre-medicate as appropriate  Outcome: P

## 2020-01-07 NOTE — PROGRESS NOTES
[facility]    Progress Note    Sveta Baer Patient Status:  Inpatient    1965 MRN [id number]   Location [facility] 4W/SW/SE Attending Alisha Erwin MD   Hosp Day # 1 PCP Yasmin Heath MD       Subjective:    Dc Petersen Pantoprazole Sodium  40 mg Oral QAM AC       Current PRN Inpatient Meds:      morphINE sulfate **OR** morphINE sulfate **OR** morphINE sulfate, ondansetron HCl, Metoclopramide HCl    Results:     Recent Labs   Lab 01/02/20  0809 01/07/20  0515   RBC 4.78 4

## 2020-01-08 ENCOUNTER — APPOINTMENT (OUTPATIENT)
Dept: HEMATOLOGY/ONCOLOGY | Facility: HOSPITAL | Age: 55
End: 2020-01-08
Attending: INTERNAL MEDICINE
Payer: COMMERCIAL

## 2020-01-08 LAB
ANION GAP SERPL CALC-SCNC: 3 MMOL/L (ref 0–18)
BASOPHILS # BLD AUTO: 0.02 X10(3) UL (ref 0–0.2)
BASOPHILS NFR BLD AUTO: 0.4 %
BUN BLD-MCNC: 11 MG/DL (ref 7–18)
BUN/CREAT SERPL: 11 (ref 10–20)
CALCIUM BLD-MCNC: 8.1 MG/DL (ref 8.5–10.1)
CHLORIDE SERPL-SCNC: 110 MMOL/L (ref 98–112)
CO2 SERPL-SCNC: 29 MMOL/L (ref 21–32)
CREAT BLD-MCNC: 1 MG/DL (ref 0.7–1.3)
DEPRECATED RDW RBC AUTO: 47.8 FL (ref 35.1–46.3)
EOSINOPHIL # BLD AUTO: 0.03 X10(3) UL (ref 0–0.7)
EOSINOPHIL NFR BLD AUTO: 0.6 %
ERYTHROCYTE [DISTWIDTH] IN BLOOD BY AUTOMATED COUNT: 14.2 % (ref 11–15)
GLUCOSE BLD-MCNC: 93 MG/DL (ref 70–99)
HAV IGM SER QL: 2.5 MG/DL (ref 1.6–2.6)
HCT VFR BLD AUTO: 38.2 % (ref 39–53)
HGB BLD-MCNC: 11.7 G/DL (ref 13–17.5)
IMM GRANULOCYTES # BLD AUTO: 0.01 X10(3) UL (ref 0–1)
IMM GRANULOCYTES NFR BLD: 0.2 %
LYMPHOCYTES # BLD AUTO: 1.51 X10(3) UL (ref 1–4)
LYMPHOCYTES NFR BLD AUTO: 31.9 %
MCH RBC QN AUTO: 28.4 PG (ref 26–34)
MCHC RBC AUTO-ENTMCNC: 30.6 G/DL (ref 31–37)
MCV RBC AUTO: 92.7 FL (ref 80–100)
MONOCYTES # BLD AUTO: 0.37 X10(3) UL (ref 0.1–1)
MONOCYTES NFR BLD AUTO: 7.8 %
NEUTROPHILS # BLD AUTO: 2.79 X10 (3) UL (ref 1.5–7.7)
NEUTROPHILS # BLD AUTO: 2.79 X10(3) UL (ref 1.5–7.7)
NEUTROPHILS NFR BLD AUTO: 59.1 %
OSMOLALITY SERPL CALC.SUM OF ELEC: 293 MOSM/KG (ref 275–295)
PLATELET # BLD AUTO: 169 10(3)UL (ref 150–450)
POTASSIUM SERPL-SCNC: 4 MMOL/L (ref 3.5–5.1)
RBC # BLD AUTO: 4.12 X10(6)UL (ref 4.3–5.7)
SODIUM SERPL-SCNC: 142 MMOL/L (ref 136–145)
WBC # BLD AUTO: 4.7 X10(3) UL (ref 4–11)

## 2020-01-08 PROCEDURE — 99233 SBSQ HOSP IP/OBS HIGH 50: CPT | Performed by: HOSPITALIST

## 2020-01-08 RX ORDER — HYDROCODONE BITARTRATE AND ACETAMINOPHEN 7.5; 325 MG/1; MG/1
1 TABLET ORAL EVERY 6 HOURS PRN
Status: DISCONTINUED | OUTPATIENT
Start: 2020-01-08 | End: 2020-01-09

## 2020-01-08 NOTE — PLAN OF CARE
Plan of care reviewed with Jose Martin Jordan and his family at bedside. Patient is tolerating clear liquids. Belching and passed gas this evening. BM x1 with some blood noted. Ambulating in the hallway with stand by assist x3. Abdominal binder in place.  Safety measu function  Description  INTERVENTIONS:  - Assess bowel function  - Maintain adequate hydration with IV or PO as ordered and tolerated  - Evaluate effectiveness of GI medications  - Encourage mobilization and activity  - Obtain nutritional consult as needed

## 2020-01-08 NOTE — PROGRESS NOTES
[facility]    Progress Note    Israel Hand Patient Status:  Inpatient    1965 MRN [id number]   Location [facility] 4W/SW/SE Attending Christina Sainz MD   Hosp Day # 2 PCP Adelina Sims MD       Subjective:    Matthew Curling ondansetron HCl, Metoclopramide HCl    Results:     Recent Labs   Lab 01/02/20  0809 01/07/20  0515 01/08/20  0545   RBC 4.78 4.17* 4.12*   HGB 13.3 11.9* 11.7*   HCT 43.0 37.9* 38.2*   MCV 90.0 90.9 92.7   MCH 27.8 28.5 28.4   MCHC 30.9* 31.4 30.6*   RDW

## 2020-01-08 NOTE — PROGRESS NOTES
[facility]    Progress Note    Alec Monreal Patient Status:  Inpatient    1965 MRN [id number]   Location [facility] 4W/SW/SE Attending Casi Dc MD   Hosp Day # 2 PCP Madi Campbell MD     Assessment and Plan:     P 20 --    Total Output -- 214 554       Net I/O     -- 8603 -166          Exam:   Gen: A&Ox3   CV: RRR   Pulm: No increased WOB   Abd: Soft, non-distended, appropriate tenderness, incisions with dermabond c/d/i     Results:     Lab Results   Component Value

## 2020-01-08 NOTE — PROGRESS NOTES
[facility] Pharmacy Note: Antimicrobial Weight Based Dose Adjustment for: piperacillin/tazobactam (Johnson Jalloh)    Juni Lade is a 47year old male who has been prescribed piperacillin/tazobactam (ZOSYN) 3.375 g every 8 hours. Estimated Creatinine Clearance: 83.

## 2020-01-08 NOTE — PLAN OF CARE
Problem: Patient/Family Goals  Goal: Patient/Family Long Term Goal  Description  Patient's Long Term Goal: Have no pain and recover quickly    Interventions:  - Follow doctor recommendations   - See additional Care Plan goals for specific interventions Consider collaborating with pharmacy to review patient's medication profile  Outcome: Progressing     Problem: PAIN - ADULT  Goal: Verbalizes/displays adequate comfort level or patient's stated pain goal  Description  INTERVENTIONS:  - Encourage pt to [facility] post-discharge preferences of patient/family/discharge partner  - Complete POLST form as appropriate  - Assess patient's ability to be responsible for managing their own health  - Refer to Case Management Department for coordinating discharge planning if t

## 2020-01-09 VITALS
BODY MASS INDEX: 36.93 KG/M2 | HEART RATE: 72 BPM | TEMPERATURE: 98 F | OXYGEN SATURATION: 96 % | DIASTOLIC BLOOD PRESSURE: 96 MMHG | SYSTOLIC BLOOD PRESSURE: 141 MMHG | HEIGHT: 75 IN | WEIGHT: 297 LBS | RESPIRATION RATE: 18 BRPM

## 2020-01-09 LAB — C DIFF TOX B STL QL: NEGATIVE

## 2020-01-09 PROCEDURE — 99239 HOSP IP/OBS DSCHRG MGMT >30: CPT | Performed by: HOSPITALIST

## 2020-01-09 RX ORDER — HYDROCODONE BITARTRATE AND ACETAMINOPHEN 7.5; 325 MG/1; MG/1
1 TABLET ORAL EVERY 6 HOURS PRN
Qty: 20 TABLET | Refills: 0 | Status: SHIPPED | OUTPATIENT
Start: 2020-01-09 | End: 2020-01-20

## 2020-01-09 NOTE — DISCHARGE SUMMARY
[facility]    Discharge Summary    Karen Gill Patient Status:  Inpatient    1965 MRN [id number]   Location [facility] 4W/SW/SE Attending Edwardo Robledo MD   Hosp Day # 3 PCP Leslie Flores MD     Date of Admission: There was no edema         History of Present Illness:   Per Dr. Bonnie Mckinnon  I am seeing Josephine Grewal today in surgical oncology at the request of Dr Verónica Juarez and Dr Angel Marquez to render an opinion regarding the surgical management of oligometastatic colon cancer. essentially returned to show the same 2 lesions in segment 4B and 2 which were largely unchanged from a size standpoint. There was a new 1.7 x 0.6 cm T2 hyperintense lateral subcapsular lesion in segment 7 which was not seen on the previous MRI.   The sonya tablet  Refills:  3     Potassium Chloride ER 20 MEQ Tbcr      Take 40 mEq by mouth daily.    Quantity:  60 tablet  Refills:  3        STOP taking these medications    lidocaine-prilocaine 2.5-2.5 % Crea  Commonly known as:  EMLA        Metoclopramide HCl 1

## 2020-01-09 NOTE — PAYOR COMM NOTE
--------------  DISCHARGE REVIEW    Payor: José BHANDARI [facility]  Subscriber #:  [id number]  Authorization Number: [id number]    Admit date: 1/6/20  Admit time:  2060  Discharge Date: 1/9/2020 11:01 AM     Admitting Physician: Romeo Murray MD  Primary Care

## 2020-01-09 NOTE — PLAN OF CARE
Problem: Patient/Family Goals  Goal: Patient/Family Long Term Goal  Description  Patient's Long Term Goal: Have no pain and recover quickly    Interventions:  - Follow doctor recommendations   - See additional Care Plan goals for specific interventions collaborating with pharmacy to review patient's medication profile  Outcome: Completed     Problem: PAIN - ADULT  Goal: Verbalizes/displays adequate comfort level or patient's stated pain goal  Description  INTERVENTIONS:  - Encourage pt to monitor pain an preferences of patient/family/discharge partner  - Complete POLST form as appropriate  - Assess patient's ability to be responsible for managing their own health  - Refer to Case Management Department for coordinating discharge planning if the patient need

## 2020-01-09 NOTE — PLAN OF CARE
Patient A&Ox4. Independent in room. Low fiber soft diet, tolerating well. Continuing IV zosyn. Dressing changed per orders. Tele in place. Controlling pain with norco. Call light in reach.  Possible d/c in AM.     Problem: Patient/Family Goals  Goal: Patien with IV or PO as ordered and tolerated  - Evaluate effectiveness of GI medications  - Encourage mobilization and activity  - Obtain nutritional consult as needed  - Establish a toileting routine/schedule  - Consider collaborating with pharmacy to review pa for needed discharge resources and transportation as appropriate  - Identify discharge learning needs (meds, wound care, etc)  - Arrange for interpreters to assist at discharge as needed  - Consider post-discharge preferences of patient/family/discharge pa

## 2020-01-09 NOTE — PROGRESS NOTES
[facility]    Progress Note    Tiago Alicea Patient Status:  Inpatient    1965 MRN [id number]   Location [facility] 4W/SW/SE Attending Imelda Singh MD   Hosp Day # 3 PCP Aldo Schaefer MD     Assessment and Plan:     P Total Output -- 422 438       Net I/O     -- 6928 -161          Exam:   Gen: A&Ox3   CV: RRR   Pulm: No increased WOB   Abd: Soft, non-distended, appropriate tenderness, incisions with dermabond c/d/i     Results:     Lab Results   Component Value Date

## 2020-01-10 ENCOUNTER — TELEPHONE (OUTPATIENT)
Dept: INTERNAL MEDICINE UNIT | Facility: HOSPITAL | Age: 55
End: 2020-01-10

## 2020-01-10 NOTE — TELEPHONE ENCOUNTER
Pt discharged from [facility] on 1/9/20, hospitalist MD recomending PCP f/u in 1 week . Pt called to schedule follow up with Primary Care Physician, pt requested f/u on 1/20/20 to coordinate w other apts.

## 2020-01-20 ENCOUNTER — LAB ENCOUNTER (OUTPATIENT)
Dept: LAB | Facility: HOSPITAL | Age: 55
End: 2020-01-20
Attending: INTERNAL MEDICINE
Payer: COMMERCIAL

## 2020-01-20 ENCOUNTER — OFFICE VISIT (OUTPATIENT)
Dept: HEMATOLOGY/ONCOLOGY | Facility: HOSPITAL | Age: 55
End: 2020-01-20
Attending: INTERNAL MEDICINE
Payer: COMMERCIAL

## 2020-01-20 VITALS
WEIGHT: 283 LBS | TEMPERATURE: 99 F | SYSTOLIC BLOOD PRESSURE: 151 MMHG | BODY MASS INDEX: 36.32 KG/M2 | OXYGEN SATURATION: 96 % | DIASTOLIC BLOOD PRESSURE: 85 MMHG | RESPIRATION RATE: 18 BRPM | HEART RATE: 76 BPM | HEIGHT: 74 IN

## 2020-01-20 DIAGNOSIS — Z51.11 ENCOUNTER FOR CHEMOTHERAPY MANAGEMENT: ICD-10-CM

## 2020-01-20 DIAGNOSIS — C78.7 COLON CANCER METASTASIZED TO LIVER (HCC): ICD-10-CM

## 2020-01-20 DIAGNOSIS — C18.6 MALIGNANT NEOPLASM OF DESCENDING COLON (HCC): ICD-10-CM

## 2020-01-20 DIAGNOSIS — C18.9 COLON CANCER METASTASIZED TO LIVER (HCC): ICD-10-CM

## 2020-01-20 DIAGNOSIS — C18.6 MALIGNANT NEOPLASM OF DESCENDING COLON (HCC): Primary | ICD-10-CM

## 2020-01-20 LAB
BASOPHILS # BLD AUTO: 0.04 X10(3) UL (ref 0–0.2)
BASOPHILS NFR BLD AUTO: 0.6 %
CEA SERPL-MCNC: 4.2 NG/ML (ref ?–5)
DEPRECATED RDW RBC AUTO: 44.2 FL (ref 35.1–46.3)
EOSINOPHIL # BLD AUTO: 0.09 X10(3) UL (ref 0–0.7)
EOSINOPHIL NFR BLD AUTO: 1.5 %
ERYTHROCYTE [DISTWIDTH] IN BLOOD BY AUTOMATED COUNT: 13.2 % (ref 11–15)
HCT VFR BLD AUTO: 43.4 % (ref 39–53)
HGB BLD-MCNC: 13 G/DL (ref 13–17.5)
IMM GRANULOCYTES # BLD AUTO: 0.02 X10(3) UL (ref 0–1)
IMM GRANULOCYTES NFR BLD: 0.3 %
LYMPHOCYTES # BLD AUTO: 2.5 X10(3) UL (ref 1–4)
LYMPHOCYTES NFR BLD AUTO: 40.3 %
MCH RBC QN AUTO: 27.1 PG (ref 26–34)
MCHC RBC AUTO-ENTMCNC: 30 G/DL (ref 31–37)
MCV RBC AUTO: 90.6 FL (ref 80–100)
MONOCYTES # BLD AUTO: 0.47 X10(3) UL (ref 0.1–1)
MONOCYTES NFR BLD AUTO: 7.6 %
NEUTROPHILS # BLD AUTO: 3.08 X10 (3) UL (ref 1.5–7.7)
NEUTROPHILS # BLD AUTO: 3.08 X10(3) UL (ref 1.5–7.7)
NEUTROPHILS NFR BLD AUTO: 49.7 %
PLATELET # BLD AUTO: 317 10(3)UL (ref 150–450)
RBC # BLD AUTO: 4.79 X10(6)UL (ref 4.3–5.7)
WBC # BLD AUTO: 6.2 X10(3) UL (ref 4–11)

## 2020-01-20 PROCEDURE — 36415 COLL VENOUS BLD VENIPUNCTURE: CPT

## 2020-01-20 PROCEDURE — 82378 CARCINOEMBRYONIC ANTIGEN: CPT

## 2020-01-20 PROCEDURE — 99215 OFFICE O/P EST HI 40 MIN: CPT | Performed by: INTERNAL MEDICINE

## 2020-01-20 PROCEDURE — 85025 COMPLETE CBC W/AUTO DIFF WBC: CPT

## 2020-01-20 RX ORDER — HYDROCODONE BITARTRATE AND ACETAMINOPHEN 7.5; 325 MG/1; MG/1
1 TABLET ORAL EVERY 6 HOURS PRN
Qty: 80 TABLET | Refills: 0 | Status: SHIPPED | OUTPATIENT
Start: 2020-01-20 | End: 2020-01-01

## 2020-01-20 NOTE — PROGRESS NOTES
Cancer Center Progress Note    Patient Name: Alec Monreal   YOB: 1965   Medical Record Number: [id number]   CSN: [id number]   Consulting Physician: Gurmeet Fried MD  Referring Physician(s): Madi Campbell  Date of Visit: 1/20/2020    Chief farnsworth recommended to correlate with current colonoscopy findings. Patient continues to feel well without any symptoms of disease or systemic signs of illness.   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 , Disp: 90 tablet, Rfl: 1  Omeprazole 40 MG Oral Capsule Delayed Release, Take 1 capsule (40 mg total) by mouth daily.  Before a meal (Patient taking different normal. No murmur  Abdomen: Soft, non tender with good bowel sounds. No hepatosplenomegaly. No palpable mass. Healed post operative scars throughout the abdomen  Extremities: No edema or calf tenderness. Neurological: Grossly intact.      Performance barter.li liver 12/19/19    CONCLUSION:   1. There is a history of adenocarcinoma of the descending colon with liver metastases.   The largest lesion in segment IVb measuring 2.9 x 2.6 cm measures larger than on the 04/12/2019 MRI and demonstrates worsening restricte informed of his diagnosis colon cancer and evidence of distant disease involvement in the liver concerning for metastatic disease at consultation.   Concerning for mets via MRI     --we have reviewed his clinical course in our multidisciplinary tumor board pre-operative MRI liver on 12/19/19 which showed his two known liver lesions, along with a new 1.0 x 0.6 cm lesion had developed in the lateral subcapsular aspect of segment VII that was suspicious for a small metastasis.     --discussed with patient and hi

## 2020-01-23 ENCOUNTER — OFFICE VISIT (OUTPATIENT)
Dept: SURGERY | Facility: CLINIC | Age: 55
End: 2020-01-23
Payer: COMMERCIAL

## 2020-01-23 DIAGNOSIS — Z09 POSTOPERATIVE EXAMINATION: Primary | ICD-10-CM

## 2020-01-23 PROCEDURE — 99024 POSTOP FOLLOW-UP VISIT: CPT | Performed by: SURGERY

## 2020-01-29 ENCOUNTER — OFFICE VISIT (OUTPATIENT)
Dept: SURGERY | Facility: CLINIC | Age: 55
End: 2020-01-29
Payer: COMMERCIAL

## 2020-01-29 VITALS
RESPIRATION RATE: 16 BRPM | TEMPERATURE: 98 F | OXYGEN SATURATION: 99 % | HEART RATE: 70 BPM | WEIGHT: 276 LBS | DIASTOLIC BLOOD PRESSURE: 86 MMHG | SYSTOLIC BLOOD PRESSURE: 149 MMHG | BODY MASS INDEX: 35 KG/M2

## 2020-01-29 DIAGNOSIS — C18.6 MALIGNANT NEOPLASM OF DESCENDING COLON (HCC): Primary | ICD-10-CM

## 2020-01-29 PROCEDURE — 99213 OFFICE O/P EST LOW 20 MIN: CPT | Performed by: SURGERY

## 2020-01-29 NOTE — PROGRESS NOTES
[facility]    Patient Name:  Abida Rubio   YOB: 1965   Gender:  Male   Appt Date: 1/29/2019   Provider:  Karina William MD   Insurance:  [facility]     PATIENT PROVIDERS  Referring Provider: No ref. April 2019: screening colonoscopy by Dr. Dewey Pinon significant for a sessile polyp within the descending colon which was concerning for malignancy. The lesion was tattooed. Pathology: moderately differentiated adenocarcinoma.  Following that, the pat · Moderately differentiated adenocarcinoma with mucinous features, 1.7 cm, invasive through the muscularis propria and into the pericolonic adipose tissue. · Tubular adenoma (3.0 cm). · Omentum involved with nodule of adenocarcinoma (3.0 cm).   · Surgica Performed by Marciano Rivera MD at [facility] OR   • COLONOSCOPY     • COLONOSCOPY N/A 4/2/2019    Performed by Carlton Maldonado MD at [address] - LEFT Left 1/6/2020    Performed by Marciano Rivera MD at [address]   • OTHER • Sickle Cell Neg    • Clotting Disorder Neg         Review of Systems:  Review of Systems   Constitutional: Negative for activity change, appetite change, chills, fatigue, fever and unexpected weight change.    HENT: Negative for congestion and trouble swa COMPARISON: [facility], CT CHEST+ABDOMEN+PELVIS(ALL CNTRST ONLY)(CPT=71260/93899), 4/11/2019, 10:35. INDICATIONS:  Abnormal CT with two liver lesions noted.      TECHNIQUE:    A comprehensive MRI examination of the abdomen was performed =====  CONCLUSION:   1. Two hepatic lesions (segment 4 B and segment 3) are suspicious for metastatic disease.            Dictated by (CST): Benton Jade MD on 4/12/2019 at 17:41       Approved by (CST): Benton Jade MD on 4/12/2019 at 18:06           P ABDOMEN/PELVIS:       The 2 lesions in the left hepatic lobe on the recent CT are both intensely hypermetabolic and highly suspicious for malignancy. .  The segment 4 lesion measures 1.5 x 2.4 cm (image 132) and has an SUV max measuring 5.4.   The more media COMPARISON: [facility], PET/CT STANDARD BODY SCAN (DYX=20381), 4/19/2019, 8:13. [facility], CT CHEST+ABDOMEN+PELVIS(ALL CNTRST ONLY)(CPT=71260/80541), 4/11/2019, 10:35.   [facility], PET/CT STANDARD BODY   SC Additionally, there is a 1.0 x 0.6 cm mildly T2 hyperintense lateral subcapsular lesion in segment VII that was not seen on the prior MRI.   This lesion appears slightly hypoenhancing on the more delayed post-contrast sequences but is difficult cm similarly is more hypoenhancing but otherwise has not significantly changed.   A new 1.0 x 0.6 cm lesion has developed in the lateral subcapsular aspect of segment VII that demonstrates similar T2 signal characteristics when compared to the initial MRI No loss of expression of the major mismatch repair proteins is noted.   This indicates a low probability of microsatellite instability-high (MSI-H).   This is typically seen in carcinomas that are the result of chromosomal rather than microsatellite instabi

## 2020-01-30 NOTE — PROGRESS NOTES
Patient presents with:  Post-Op: Pt here for 1st post op s/p laparoscopic partial colectomy, laparoscopic exision of omental mass, primary repair umbilical hernia 0/6/5757. Pt states appetite good.   Pt c/o intermittent pain to right side of abdomen - reli

## 2020-02-01 DIAGNOSIS — C18.6 MALIGNANT NEOPLASM OF DESCENDING COLON (HCC): ICD-10-CM

## 2020-02-01 DIAGNOSIS — C78.7 COLON CANCER METASTASIZED TO LIVER (HCC): ICD-10-CM

## 2020-02-01 DIAGNOSIS — C18.9 COLON CANCER METASTASIZED TO LIVER (HCC): ICD-10-CM

## 2020-02-01 RX ORDER — HYDROCODONE BITARTRATE AND ACETAMINOPHEN 7.5; 325 MG/1; MG/1
1 TABLET ORAL EVERY 6 HOURS PRN
Qty: 80 TABLET | Refills: 0 | Status: CANCELLED | OUTPATIENT
Start: 2020-02-01

## 2020-02-04 NOTE — PROGRESS NOTES
HPI:    Patient ID: Troy Rose is a 54year old male. Patient is here for follow up for his blood pressure. The patient is compliant with medications and no side effects. There are no acute issues and patient is requesting refills.  The patient state in 3-6 months or as needed. No orders of the defined types were placed in this encounter.       Meds This Visit:  Requested Prescriptions     Signed Prescriptions Disp Refills   • amLODIPine Besylate 10 MG Oral Tab 90 tablet 1     Sig: Take 1 tablet (10

## 2020-02-04 NOTE — TELEPHONE ENCOUNTER
----- Message from Dorothy Salazar RN sent at 4/4/2019  6:06 PM CDT -----  Regarding: Recall colon in 1 year by Dr. Kody De Leon. Colon done 4/2/19  Recall colon in 1 year by Dr. Kody De Leon.   Colon done 4/2/19

## 2020-02-13 NOTE — TELEPHONE ENCOUNTER
Pt. states that he is supposed to return to work on Mon 2/17/2020, so he wants to know if he can get return to work note from Dr Gladis Adrian or will he need to get from [address] [place] [address]? Pt. Also wants to know if there are any restrictions.  Pt. Would like to p/up n

## 2020-02-17 NOTE — PROGRESS NOTES
Cancer Center Progress Note    Patient Name: Cony Al   YOB: 1965   Medical Record Number: [id number]   CSN: [id number]   Consulting Physician: Roverto Reilly MD  Referring Physician(s): Larisa Corbin  Date of Visit: 2/17/2020    Chief farnsworth recommended to correlate with current colonoscopy findings. Patient continues to feel well without any symptoms of disease or systemic signs of illness.   Denies any bleeding or bruising symptoms, no chest pain, no dyspnea, no nausea, vomiting abdominal pa neuropathy is still present, currently on gabapentin to 800 mg TID. Patient has no other complaints or concerns on ROS today.      Past Medical History:  Past Medical History:   Diagnosis Date   • Back problem    • Chronic low back pain     lumbar spine is Inability: Not on file      Transportation needs:        Medical: Not on file        Non-medical: Not on file    Tobacco Use      Smoking status: Never Smoker      Smokeless tobacco: Never Used      Tobacco comment: rare cigar    Substance and Sexual Activ family, and watching basketball.        Allergies:     Dust Mite Extract       UNKNOWN    Current Medications:  Prochlorperazine Maleate (COMPAZINE) 10 mg tablet, Take 1 tablet (10 mg total) by mouth every 6 (six) hours as needed for Nausea., Disp: 30 table Pertinent positives and negatives noted in the the HPI.      Vital Signs:  /82 (BP Location: Left arm, Patient Position: Sitting, Cuff Size: large)   Pulse 78   Temp 98.8 °F (37.1 °C) (Oral)   Resp 18   Ht 1.88 m (6' 2\")   Wt 127.9 kg (282 lb)   SpO2 colon:   · Moderately differentiated adenocarcinoma with mucinous features, 1.7 cm, invasive through the muscularis propria and into the pericolonic adipose tissue. · Tubular adenoma (3.0 cm). · Omentum involved with nodule of adenocarcinoma (3.0 cm). presence of 2 suspicious lesions in the liver concerning for metastatic disease; segments 4B and segment 3, now with a third liver lesion concerning for mets    Plan:    1.) Adenocarcinoma of the colon with three liver mets --stage IV    --patient was info for splenic flexure on 1/6/2020 with Dr. Vira Patiño.  The patient's primary distal transverse/desceing colon mass was removed, along with 5/16 nodes positive for malignancy along with an omentum nodule; final pathology ypT3, N2a, M1c.     --He underwent a pre

## 2020-02-18 NOTE — PROGRESS NOTES
Pt here for C1D1 AVASTIN/FOLFIRI. Arrives Ambulating independently, accompanied by Self           Patient reports possible pregnancy since last therapy cycle: Not Applicable    Consent signed. BSA calculations double checked by this RN.  Reinforced overall

## 2020-02-19 NOTE — TELEPHONE ENCOUNTER
C1D1 Bevacizumab/5FU/Camptosar-2/18/20    Florinda Rough C1D1 post chemo - he is having a H/A and some stomach nausea, instructed patient to take Tylenol for H/A and antiemetics for nausea, eat small frequent meals and push fluids.     He states the periphe

## 2020-02-19 NOTE — TELEPHONE ENCOUNTER
Jasselvia Patel reports that since his chemo was given yesterday, he has had increased pain and tingling in his feet.   I explained that he did not receive the oxaliplatin, he received irinotecan with 5FU and it is very unlikely that this would have such an acute e

## 2020-02-25 NOTE — TELEPHONE ENCOUNTER
I called pt to see if he had spoken to Dr Tim Pineda re: scheduling of f/u appts? Pt reports he has not but would like to discuss surgery options with Dr Tim Pineda. Pt further reports he is not sure he wants to continue chemotherapy.   I confirmed appt for t

## 2020-02-25 NOTE — TELEPHONE ENCOUNTER
Medication request: Gabapentin 800mg 1 TAB TID    LOV: 12/18/19  NOV: none    [place] 11[place]22/19 #90 r-0

## 2020-02-26 NOTE — PATIENT INSTRUCTIONS
-Continue whirlpool contrast baths for hands and feet  -Continue Gabapentin and Lyrica at increased dose as advised   -Good shoes to protect your feet  -Follow up in 3 months  -Good luck with the surgery

## 2020-02-26 NOTE — PROGRESS NOTES
[address]  NEW PATIENT EVALUATION      Chief Complaint: bilateral hand and feet pain.     HISTORY OF PRESENT ILLNESS:   Patient presents with:  Numbness: LOV 12/18/19 states he was taking gabapentin and lyri she is applying in the area of discomfort. He denies any low back or hip pain. He denies any changes in bowel bladder habits.   He does endorse some difficulty with balance as well when standing initially but is able to feel much more comfortable once he problem. He had a PET CT scan in April which did not show any lesions in the spine.     PAST MEDICAL HISTORY:     Past Medical History:   Diagnosis Date   • Back problem    • Chronic low back pain     lumbar spine issue   • Colon cancer ([facility] [address]    • Esophage capsule (40 mg total) by mouth daily. Before a meal (Patient taking differently: Take 40 mg by mouth daily as needed. Before a meal ) 30 capsule 11   • Potassium Chloride ER 20 MEQ Oral Tab CR Take 40 mEq by mouth daily.  60 tablet 3         ALLERGIES: edema bilaterally   Skin: No lesions noted   Cognition: alert & oriented x 3, attentive, able to follow 2 step commands, comprehention intact, spontaneous speech intact  Psychiatric: Mood and affect appropriate    Gait Normal  Able to toe walk and heel wal [id number]    IMAGING:     PET/CT dated November 22, 2019 revealed:   CONCLUSION:   1. There is history of colon cancer.   There is nonspecific nodular FDG activity within the splenic flexure of the large bowel which is less FDG avid compared with prior exam

## 2020-02-28 NOTE — TELEPHONE ENCOUNTER
Spoke with Chip Ross this am as he has requested to cancel all of his chemo appts, labs and MD appts. He says, \"I'm not doing chemo anymore, I'm having the surgery\". Acknowledged that he is having a rough time with the chemotherapy.    Encouraged appluis eduardo rivera

## 2020-03-02 NOTE — PROGRESS NOTES
Cancer Center Progress Note    Patient Name: Jaya Espinoza   YOB: 1965   Medical Record Number: [id number]   CSN: [id number]    Consulting Physician: Mariel Richardson MD  Referring Physician(s): Giulia Henry  Date of Visit: 3/02/2020    Chief recommended to correlate with current colonoscopy findings. Patient continues to feel well without any symptoms of disease or systemic signs of illness.   Denies any bleeding or bruising symptoms, no chest pain, no dyspnea, no nausea, vomiting abdominal pa afebrile at home, with no signs of infection by vital signs or CBC on presentation. He states his peripheral neuropathy is still present, currently on gabapentin to 800 mg TID. Patient has no other complaints or concerns on ROS today.      Past Medical Social Needs      Financial resource strain: Not on file      Food insecurity:        Worry: Not on file        Inability: Not on file      Transportation needs:        Medical: Not on file        Non-medical: Not on file    Tobacco Use      Smoking status [facility]. He lives his [place] in [place], [place]. He formerly was a , enjoys time with his family, and watching basketball.        Allergies:     Dust Mite Extract       UNKNOWN    Current Medications:  pregabalin 75 MG Oral Cap, Take myalgias, arthralgias, muscle weakness. Neurological: Negative for headaches, dizziness, speech problems, gait problems and weakness. +peripheral neuropathy    A comprehensive 14 point review of systems was completed.   Pertinent positives and negatives no 03/02/2020 02:20 PM    CO2 29.0 03/02/2020 02:20 PM    ALKPHO 111 03/02/2020 02:20 PM    AST 17 03/02/2020 02:20 PM    ALT 28 03/02/2020 02:20 PM     Final Diagnosis: 1/6/2020      Distal transverse colon/descending colon:   · Moderately differentiated joseph MRI.      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  Pt will also undergo repeat colonoscopy within 1 yr of his diagnosis with Dr. Jose Felix     --pt completed cycle 12 5FU/LV +bevacizumab 11/25/19 (bevacizumab added at cycle 2 & all subsequent cycles; oxaliplatin discontinued after cycle discussed that dose alteration can be made in light of side effect profile, but patient would like to stop chemotherapy at this time    --His care was discussed at our multidisciplinary tumor board today.   Patient will proceed with planned liver resection,

## 2020-03-03 NOTE — PROGRESS NOTES
[facility]    Patient Name:  Cony Al   YOB: 1965   Gender:  Male   Appt Date: 2/26/2020   Provider:  Jami Valera MD   Insurance:  [facility]     PATIENT PROVIDERS  Referring Provider: No ref. Pathology: moderately differentiated adenocarcinoma.  Following that, the patient underwent staging CT of the chest abdomen pelvis which showed 2 ill-defined hepatic masses within segment 4 and 2 measuring 1.5 x 1.7 cm and 1.8 cm greatest dimension, respect · Surgical margins are negative for carcinoma, nearest margin is proximal, 5.4 cm from mass. · Five out of 16 lymph nodes positive for metastatic carcinoma (5/16). · ypT3, N2a, M1c.  · See comment.     1/6/2020: underwent laparoscopic partial colectomy b •  Ondansetron HCl (ZOFRAN) 8 MG tablet, Take 1 tablet (8 mg total) by mouth every 8 (eight) hours as needed for Nausea., Disp: 30 tablet, Rfl: 3  •  HYDROcodone-acetaminophen 7.5-325 MG Oral Tab, Take 1 tablet by mouth every 6 (six) hours as needed. , Disp Drug use: No    Other Topics      Concerns:        Caffeine Concern: Yes          coffee, soda-32 oz./day    Social History Narrative      Coretta Kumar is ; has a long term partner, Mis Connelly, 4 yrs. Father of 4 children; ages 32-20.  He works in [address] [employer] Head: Normocephalic and atraumatic. Eyes: Pupils are equal, round, and reactive to light. Conjunctivae are normal. Right eye exhibits no discharge. Left eye exhibits no discharge. Neck: No thyromegaly present.    Cardiovascular: Normal rate and regular segment 4 measures 19 x 17 mm on T2 weighted image 19 series 6, and lesion in segment 3 measures 23 x 15 mm. Evaluation is slightly degraded by motion related artifact on the postcontrast scans, but there are no unequivocal additional hepatic lesions.   GA TECHNIQUE:    PET/CT study of the head and torso, done with 80.3 millicuries of LXCQZMPI-50 FDG (Fluorodeoxyglucose) injected into a left antecubital vein.   Low dose non-contrast CT scanning was performed using a dedicated integrated PET/CT scanner for   a MUSCULOSKELETAL:  Normal.  No pathologic FDG activity. No suspicious lesions on CT imaging.                   =====  CONCLUSION:   1. Abnormal F 18 FDG PET-CT study.   2. The 2 hepatic lesions seen on the recent CT study are intensely hypermetabolic highly 2 lobulated hypoenhancing liver lesions are again noted.   One lesion in segment IVb measures 2.9 x 2.6 cm (series 4, image 22), previously 1.9 x 1.7 cm on the 04/12/2019 MRI but relatively similar to the recent PET/CT given inherent difference VASCULATURE:            The portal vein, IVC, splenic, hepatic, renal veins, and mesenteric veins are patent. LYMPH NODES:            No lymphadenopathy. ABDOMINAL WALL:      Unremarkable.     BONES:             Suboptimally assessed by scan protocol, The patient's history of moderately differentiated adenocarcinoma of the descending colon with imaging showing metastasis to the liver, status post neoadjuvant chemotherapy, status post 12 cycles of FOLFOX with bevacizumab is noted.     This diagnosis was Electronically Signed by:  Anish Leyva MD

## 2020-03-04 NOTE — PATIENT INSTRUCTIONS
Surgery:  Robot assisted liver resection, possible open    Date of Surgery:  Monday 4-6-2020    Hosptial:  [address] [place] [address]   Phone: [phone]  · This is an inpatient procedure.   · Use the provided Chlorhex Rachel Enriquez M.D.   Complex Nithin Mora January nurse line:  [phone]  Monday through Friday 8:00am to 4:30pm.     For Dr. Mora January office: [phone]/ Fax: [phone]

## 2020-03-04 NOTE — TELEPHONE ENCOUNTER
Gael Rubi calling to see if Daniele Munguia received his work paperwork by fax. Please let him know when Dr. Benita Chowdary has filled it out. Thank you.

## 2020-03-11 NOTE — TELEPHONE ENCOUNTER
Flakito/[employer] RN Case Manager called in stating that patient has been enrolled and followed in the .     She wanted to make sure Dr. Carlos Dalal was aware and if he needs any assistance he can reach out to her.

## 2020-03-11 NOTE — PROGRESS NOTES
[facility]    Patient Name:  Vitaly Roblero   YOB: 1965   Gender:  Male   Appt Date: 3/4/2020   Provider:  Larry Ugarte MD   Insurance:  [facility]     PATIENT PROVIDERS  Referring Provider: No ref. Pathology: moderately differentiated adenocarcinoma.  Following that, the patient underwent staging CT of the chest abdomen pelvis which showed 2 ill-defined hepatic masses within segment 4 and 2 measuring 1.5 x 1.7 cm and 1.8 cm greatest dimension, respect · Surgical margins are negative for carcinoma, nearest margin is proximal, 5.4 cm from mass. · Five out of 16 lymph nodes positive for metastatic carcinoma (5/16). · ypT3, N2a, M1c.  · See comment.     1/6/2020: underwent laparoscopic partial colectomy b both feet from chemotx   • Personal history of antineoplastic chemotherapy    • Visual impairment     eyeglasses      Reviewed:  Past Surgical History:   Procedure Laterality Date   • CATHETER INSERTION PORT-A-CATH Right 4/26/2019    Performed by Cee Saleh • Heart Disorder Mother    • Diabetes Maternal Aunt    • Cancer Maternal Aunt 73        pancreatic cancer   • Cancer Maternal Aunt 68        bladder cancer; tobacco user   • Glaucoma Neg    • Bleeding Disorders Neg    • Anemia Neg    • Sickle Cell Neg    • Abdominal: Soft. Bowel sounds are normal. He exhibits no distension. There is no hepatosplenomegaly. There is no tenderness. No hernia. Musculoskeletal: Normal range of motion. Neurological: He is alert and oriented to person, place, and time.    Skin: Sk KIDNEYS:          A 2.5 cm left interpolar renal cyst, a 3 mm left upper pole renal cyst, and a 3 mm right lower pole renal cyst.  No solid renal lesion. Amari Mcclellan VASCULAR:                     No aneurysm. Flow in major intra- abdominal arteries and veins.   LYMPH HEAD/NECK:    There is diffuse, likely physiologic thyroid activity. Please correlate with endocrine. No pathologic FDG activity. LUNGS:             There is calcified right upper lobe granuloma. No pathologic FDG activity.   No FDG avid pulmonary nod 4. The hypermetabolic bowel wall thickening seen in the ascending colon is nonspecific and physiologic activity is commonly seen in this area however malignancy cannot be entirely excluded. Please correlate with colonoscopy findings.            Dictated by The lesion in the posterior subcapsular aspect of segment III is somewhat difficult to visualize on some sequences due to respiratory motion but measures 2.3 x 2.1 cm (series 4, image 15), previously 2.3 x 1.5 cm on 04/12/2019 and relatively s 1. There is a history of adenocarcinoma of the descending colon with liver metastases. The largest lesion in segment IVb measuring 2.9 x 2.6 cm measures larger than on the 04/12/2019 MRI and demonstrates worsening restricted diffusion.   While such   findi PMS2:    Intact nuclear expression. MSH2:   Intact nuclear expression.   MSH6:   Intact nuclear expression.     Comment:  No loss of expression of the major mismatch repair proteins is noted.   This indicates a low probability of microsatellite instability

## 2020-03-20 NOTE — TELEPHONE ENCOUNTER
Left message for Chris, that it appears he has refills for medication to call his pharmacy, call back if there are further issues.

## 2020-04-02 NOTE — PROGRESS NOTES
Called patient and explained that I would prefer we proceed with open resection given aerosolization risk of blood borne viruses. He agrees. Laci Mcduffie MD  Complex General Surgical Oncology  [facility]  Pager [phone]  [email or web address]. [email or web address].

## 2020-04-06 PROBLEM — C78.7 ADENOCARCINOMA OF COLON METASTATIC TO LIVER (HCC): Status: ACTIVE | Noted: 2020-01-01

## 2020-04-06 PROBLEM — C18.9 ADENOCARCINOMA OF COLON METASTATIC TO LIVER (HCC): Status: ACTIVE | Noted: 2020-01-01

## 2020-04-06 NOTE — ANESTHESIA PREPROCEDURE EVALUATION
Anesthesia PreOp Note    HPI:     Vitaly Roblero is a 54year old male who presents for preoperative consultation requested by: Joe Rosario MD    Date of Surgery: 4/6/2020    Procedure(s):  LIVER RESECTION  Indication: Malignant neoplasm of descending eyeglasses       Past Surgical History:   Procedure Laterality Date   • CATHETER INSERTION PORT-A-CATH Right 4/26/2019    Performed by Foster Lyman MD at [facility] OR   • COLONOSCOPY     • COLONOSCOPY N/A 4/2/2019    Performed by Marissa Howe MD file.        Dust Mite Extract       OTHER (SEE COMMENTS)    Comment:sneezing    Family History   Problem Relation Age of Onset   • Hypertension Mother    • Thyroid Disorder Mother    • Heart Disorder Mother    • Diabetes Maternal Aunt    • Cancer Maternal file        Forced sexual activity: Not on file    Other Topics      Concerns:         Service: Not Asked        Blood Transfusions: Not Asked        Caffeine Concern: Yes          coffee, soda-32 oz./day        Occupational Exposure: Not Asked reviewed    Airway   Mallampati: II  TM distance: >3 FB  Dental      Pulmonary - negative ROS and normal exam   Cardiovascular - normal exam  (+) hypertension,     Neuro/Psych    (+)  neuromuscular disease,       GI/Hepatic/Renal    (+) GERD, liver disease

## 2020-04-06 NOTE — ANESTHESIA POSTPROCEDURE EVALUATION
Patient: Hesham Cleaning    Procedure Summary     Date:  04/06/20 Room / Location:  [address] [place] [address] MAIN OR 09 / 300 [facility] OR    Anesthesia Start:  5650 Anesthesia Stop:  1111    Procedure:  LIVER RESECTION (N/A Abdomen) Diagnosis:       Malignant neoplasm of descending

## 2020-04-06 NOTE — ANESTHESIA PROCEDURE NOTES
Spinal Block  Performed by: Suze Lopez CRNA  Authorized by: Iqra Lee MD       General Information and Staff    Start Time:  4/6/2020 7:34 AM  End Time:  4/6/2020 7:38 AM  Anesthesiologist:  Iqra Lee MD  CRNA:  Afshin Blakely

## 2020-04-06 NOTE — BRIEF OP NOTE
Pre-Operative Diagnosis: Malignant neoplasm of descending colon ([facility] [address] [C18.6]  Liver metastasis ([address] [C78.7]     Post-Operative Diagnosis: Malignant neoplasm of descending colon ([address] [C18. 6]Liver metastasis ([address] [C78.7]      Procedure Performed:   Proce

## 2020-04-06 NOTE — ANESTHESIA PROCEDURE NOTES
Airway  Date/Time: 4/6/2020 8:09 AM  Urgency: Elective    Airway not difficult    General Information and Staff    Patient location during procedure: OR  Anesthesiologist: Camille Pang MD  Resident/CRNA: Chintan Azul CRNA  Performed: CRNA     In

## 2020-04-06 NOTE — ANESTHESIA PROCEDURE NOTES
Peripheral IV  Date/Time: 4/6/2020 7:46 AM  Inserted by: Brownyn Malloy MD    Placement  Needle size: 16 G  Laterality: left  Location: hand  Local anesthetic: none  Site prep: alcohol  Technique: anatomical landmarks  Attempts: 1

## 2020-04-06 NOTE — H&P
Patient seen and examined. No changes to the attached history and physical are noted. 54year old male presenting today for planned liver resection. Vincenzo Cain MD  [facility] General Surgical Oncology  [facility]  Pager [phone]  HAMZAH. Victoriano MRI was obtained on 4/12/2019. This again revealed the 2 lesions which were consistent with metastatic disease. 4/15/2019 PET scan only showed uptake of the above-mentioned hepatic lesions and otherwise was largely unremarkable.     The patient was eval 1/6/2020: underwent laparoscopic partial colectomy by Dr. Ibrahima Mix. Intraoperatively, he was noted to have a metastatic omental deposit which was excised.  Pathology returned to show moderately differentiated adenocarcinoma with mucinous features 1.7 cm in size • [address] [facility] NASAL FOR BODY,LAT RHINOTOMY  1986    deviated septum   • SHOULDER SURG PROC UNLISTED Right 1989      Reviewed Social History:  Social History    Socioeconomic History      Marital status: Single      Spouse name: Not on file      Number of children Gastrointestinal: Negative for nausea, abdominal pain and abdominal distention. Endocrine: Negative for polydipsia and polyuria. Genitourinary: Negative for dysuria and difficulty urinating. Musculoskeletal: Negative for myalgias.    Skin: Negative fo

## 2020-04-06 NOTE — RESPIRATORY THERAPY NOTE
Pt has no history of DENEEN and does not use cpap at home. Pt decline the use of hospital cpap at this time.

## 2020-04-06 NOTE — PROGRESS NOTES
[facility] HOSP - [facility]    Progress Note    Juni Lade Patient Status:  Surgery Admit - Inpt    1965 MRN [id number]   Location [address] UNIT Attending Kaykay Mancera MD   Hosp Day # 0 PCP Angella Brumfield MD hepatectomy segment 4B, partial hepatectomy segment 7, microwave ablation of liver tumor margin, ultrasound liver, resection of portion of right hemidiaphragm, greater omentectomy, resection left Gerota's fascia tumor, destruction of small bowel mesentery

## 2020-04-06 NOTE — PLAN OF CARE
Problem: Patient Centered Care  Goal: Patient preferences are identified and integrated in the patient's plan of care  Description  Interventions:  - Provide timely, complete, and accurate information to patient/family  - Incorporate patient and family k injury  Description  INTERVENTIONS:  - Assess pt frequently for physical needs  - Identify cognitive and physical deficits and behaviors that affect risk of falls.   - [place] fall precautions as indicated by assessment.  - Educate pt/family on patient sa

## 2020-04-06 NOTE — ANESTHESIA PROCEDURE NOTES
Arterial Line  Performed by: Latricia Padilla CRNA  Authorized by: Marisa Miller MD     General Information and Staff    Procedure Start:  4/6/2020 7:40 AM  Procedure End:  4/6/2020 7:44 AM  Anesthesiologist:  Marisa Miller MD  Performed By:

## 2020-04-07 NOTE — ANESTHESIA POST-OP FOLLOW-UP NOTE
[facility] HOSP - [facility]   Acute Pain Rounds Note  2020    Patient name: Antonia Carty 54year old male  : 1965  MRN: [id number]    Diagnosis: Preop testing  (primary encounter diagnosis)  Malignant neoplasm of descending colon ([address]  Liver m

## 2020-04-07 NOTE — PROGRESS NOTES
Preoperative diagnosis:  1. Metastatic colon cancer    Operations performed:  1. Partial hepatectomy of segment 7, segment 4B, segment 3  2. Intraoperative ultrasound  3. Omentectomy  4. Right hemidiaphragmatic resection  5.   Resection of Gerota's fas

## 2020-04-07 NOTE — PHYSICAL THERAPY NOTE
MDO received, chart reviewed. RN approved participation but patient was already up and walking with RN staff and just returned to chair, pt feels tired. Will check back later, pt and RN aware.

## 2020-04-07 NOTE — PROGRESS NOTES
[facility] HOSP - [facility]    Progress Note    Miles Nieto Patient Status:  Surgery Admit - Inpt    1965 MRN [id number]   Location [address] UNIT Attending Kortney Gray MD   Hosp Day # 1 PCP Ankur Hooker MD control with PCA  - NPO except ice chips and sips with meds   - pt/ot    Essential hypertension  - monitor  - restart home meds     Chronic back pain  - resume oral meds     DVT: lovenox daily   CODE: Full   DISPO: pending           Results:     Lab Result

## 2020-04-07 NOTE — OPERATIVE REPORT
Date of operation: 4/6/2020    Preoperative diagnosis:  1. History of left-sided oligometastatic colon cancer status post neoadjuvant chemotherapy [12 cycles of FOLFOX] and left colectomy  2. Liver metastasis from #1    Operations performed:  1.   Explora This is a very pleasant and otherwise healthy 58-year-old gentleman who was diagnosed with left-sided colon cancer last year. After staging, the patient was noted to have evidence of liver metastasis in segments 3 and 4B.   He went on to receive 12 cycles The patient was brought to the operating room and laid supine on the operating table. General endotracheal anesthesia was induced without complication. All pressure points were padded appropriately. The arms were tucked.   A Voss's catheter was inserted approached the lesion in segment 7 first.  Thereafter, the tumor deposit in segment 4B was approached. The lesion was rather sizable and was occupying the entirety of segment 4B.   On ultrasound, I could visualize the anterior branch of the left portal vei .Attention was then directed towards the right hemidiaphragm. There was a tumor deposit which measured around 2 cm that I had identified. The tumor was grasped alongside a small portion of the underlying musculature.   Using electrocautery it was dissecte The patient tolerated the procedure well was taken to the postoperative incision carried in a stable state. Was present for the entire procedure. Warren Eduardo MD  Complex General Surgical Oncology  [facility]  Pager [phone]  [email or web address]. [email or web address]

## 2020-04-07 NOTE — PLAN OF CARE
Problem: Patient Centered Care  Goal: Patient preferences are identified and integrated in the patient's plan of care  Description  Interventions:  - What would you like us to know as we care for you?   - Provide timely, complete, and accurate informatio FALL  Goal: Free from fall injury  Description  INTERVENTIONS:  - Assess pt frequently for physical needs  - Identify cognitive and physical deficits and behaviors that affect risk of falls.   - [place] fall precautions as indicated by assessment.  - Educ

## 2020-04-07 NOTE — PLAN OF CARE
A&Ox4. Upx1 SB. Worked with PT this morning. Up in chair all day. Ambulating in halls. NPO, ice chips and sips with meds. Q4 I&O's. Voss in place with adequate output. L DENNIS drain bloody output. SCDs in place and lovenox.  Dilaudid PCA and Ofirmev with ad measures as appropriate and evaluate response  - Consider cultural and social influences on pain and pain management  - Manage/alleviate anxiety  - Utilize distraction and/or relaxation techniques  - Monitor for opioid side effects  - Notify MD/LIP if inte Department for coordinating discharge planning if the patient needs post-hospital services based on physician/LIP order or complex needs related to functional status, cognitive ability or social support system  Outcome: Progressing     Problem: Altered Com

## 2020-04-07 NOTE — PHYSICAL THERAPY NOTE
PHYSICAL THERAPY EVALUATION - INPATIENT     Room Number: 462/462-A  Evaluation Date: 4/7/2020  Type of Evaluation: Initial   Physician Order: PT Eval and Treat       Reason for Therapy: Mobility Dysfunction and Discharge Planning    PHYSICAL THERAPY MAX light and all needs in reach. He would benefit from 1-2 more sessions to reach goals, will need to perform 7 stairs in order to return home. Patient will benefit from continued IP PT services to address these deficits in preparation for discharge. primary repair umbilical hernia   • PORT, INDWELLING, IMP Left    • REMV NASAL FOR BODY,LAT RHINOTOMY  1986    deviated septum   • SHOULDER SURG PROC UNLISTED Right 1989       HOME SITUATION  Type of Home: House   Home Layout: Multi-level  Stairs to Cydcor commode, etc.): A Little   -   Moving from lying on back to sitting on the side of the bed?: A Little   How much help from another person does the patient currently need. ..   -   Moving to and from a bed to a chair (including a wheelchair)?: A Little   - preparation for discharge.    Goal #5   Current Status    Goal #6    Goal #6  Current Status

## 2020-04-07 NOTE — PROGRESS NOTES
Surgical Oncology Inpatient Progress Note    Subjective:  POD 1 from exploratory laparotomy, partial hepatectomy segment 3, partial hepatectomy segment 4B, partial hepatectomy segment 7, microwave ablation of liver tumor margin, ultrasound liver, resection POD 1 from Exploratory laparotomy, partial hepatectomy segment 3, partial hepatectomy segment 4B, partial hepatectomy segment 7, microwave ablation of liver tumor margin, ultrasound liver, resection of portion of right hemidiaphragm, greater omentectomy, r

## 2020-04-08 NOTE — PHYSICAL THERAPY NOTE
Attempted to see pt, however pt. Had just started to eat first liquid meal. Pt. Reports having walked already this AM.  Will return back to try stairs as tolerated.

## 2020-04-08 NOTE — PLAN OF CARE
Chris is aware of POC at this time. He is tolerating a CLD. He is belching but no gas passed. He is ambulating in the halls with assistance. Voss catheter discontinued. He is voiding freely. Potassium and phosphate replaced.     Problem: Patient techniques  - Monitor for opioid side effects  - Notify MD/LIP if interventions unsuccessful or patient reports new pain  - Anticipate increased pain with activity and pre-medicate as appropriate  Outcome: Progressing     Problem: SAFETY ADULT - FALL  Goal or social support system  Outcome: Progressing     Problem: Altered Communication/Language Barrier  Goal: Patient/Family is able to understand and participate in their care  Description  Interventions:  - Assess communication ability and preferred communic

## 2020-04-08 NOTE — PROGRESS NOTES
Surgical Oncology Inpatient Progress Note    Subjective:  POD 2 from exploratory laparotomy, partial hepatectomy segment 3, partial hepatectomy segment 4B, partial hepatectomy segment 7, microwave ablation of liver tumor margin, ultrasound liver, resection POD 2 from Exploratory laparotomy, partial hepatectomy segment 3, partial hepatectomy segment 4B, partial hepatectomy segment 7, microwave ablation of liver tumor margin, ultrasound liver, resection of portion of right hemidiaphragm, greater omentectomy, r

## 2020-04-08 NOTE — CM/SW NOTE
04/08/20 1300   CM/SW Screening   Referral Source ;Nurse   Information Source [facility] staff; Chart review;Nursing rounds   Patient's Mental Status Alert;Oriented   Patient's [address]   Number of Levels in Home 1   Number of Stair in

## 2020-04-08 NOTE — PLAN OF CARE
Problem: Patient Centered Care  Goal: Patient preferences are identified and integrated in the patient's plan of care  Description  Interventions:  - What would you like us to know as we care for you?   - Provide timely, complete, and accurate informatio FALL  Goal: Free from fall injury  Description  INTERVENTIONS:  - Assess pt frequently for physical needs  - Identify cognitive and physical deficits and behaviors that affect risk of falls.   - [place] fall precautions as indicated by assessment.  - Educ preferred communication style  - Implement communication aides and strategies  - Use visual cues when possible  - Listen attentively, be patient, do not interrupt  - Minimize distractions  - Allow time for understanding and response  - Establish method for

## 2020-04-08 NOTE — PROGRESS NOTES
[facility] HOSP - [facility]    Progress Note    Antonia Carloz Patient Status:  Surgery Admit - Inpt    1965 MRN [id number]   Location [address] UNIT Attending Joselo Champion MD   Hosp Day # 2 PCP Liv Perez MD tumor, resction of paracolic gutter peritoneum on 4/6 by Dr. Crooks Cea   - pain control with PCA  - CLD now per surgical onc  - pt/ot    Essential hypertension  - monitor  - continue home meds     Chronic back pain  - resume oral meds     DVT: lovenox daily

## 2020-04-09 NOTE — PLAN OF CARE
Chris is aware of POC at this time. He is tolerating a after eating a full liquid diet, he c/o fullness. He is belching but no gas passed. He is ambulating in the halls with assistance. Pain managed with PRN oxycodone.  He is using the dilaudid PC management  - Manage/alleviate anxiety  - Utilize distraction and/or relaxation techniques  - Monitor for opioid side effects  - Notify MD/LIP if interventions unsuccessful or patient reports new pain  - Anticipate increased pain with activity and pre-medi physician/LIP order or complex needs related to functional status, cognitive ability or social support system  Outcome: Progressing     Problem: Altered Communication/Language Barrier  Goal: Patient/Family is able to understand and participate in their car

## 2020-04-09 NOTE — PLAN OF CARE
PCA Dilaudid and scheduled tylenol continued for pain management. Voiding per urinal. IVF infusing. Surgical incision CDI. DENNIS Drain output documented. Clear liquids tolerated. VSS.  Patient remained in chair for night most of night, and ambulated hallways t Manage/alleviate anxiety  - Utilize distraction and/or relaxation techniques  - Monitor for opioid side effects  - Notify MD/LIP if interventions unsuccessful or patient reports new pain  - Anticipate increased pain with activity and pre-medicate as approp or complex needs related to functional status, cognitive ability or social support system  Outcome: Progressing     Problem: Altered Communication/Language Barrier  Goal: Patient/Family is able to understand and participate in their care  Description  Inte

## 2020-04-09 NOTE — PROGRESS NOTES
Surgical Oncology Inpatient Progress Note    Subjective:  POD 3 from exploratory laparotomy, partial hepatectomy segment 3, partial hepatectomy segment 4B, partial hepatectomy segment 7, microwave ablation of liver tumor margin, ultrasound liver, resection POD 3 from Exploratory laparotomy, partial hepatectomy segment 3, partial hepatectomy segment 4B, partial hepatectomy segment 7, microwave ablation of liver tumor margin, ultrasound liver, resection of portion of right hemidiaphragm, greater omentectomy, r

## 2020-04-09 NOTE — PAYOR COMM NOTE
--------------  CONTINUED STAY REVIEW    Anoop JEAN  Subscriber #:  [id number]  Authorization Number: [id number]    Admit date: 4/6/20  Admit time: 2562  Requesting extension from 4/8. Thank you.       Admitting Physician: Cami Mendoza CREATSERUM 0.99 0.95 0.81   GFRAA 99 104 116   GFRNAA 85 90 100   CA 8.2* 8.4* 8.7    140 139   K 4.6 3.7 3.7    109 108   CO2 28.0 27.0 25.0            Physical Exam:  General: NAD  Abdomen: NABS. Incision site CDI.  Anticipated tenderness fo tab 20 mg     Date Action Dose Route User    4/9/2020 0941 Given 20 mg Oral Enma Lopez, FISH      famoTIDine (PEPCID) injection 20 mg     Date Action Dose Route User    4/8/2020 2106 Given 20 mg Intravenous Marti Trivedi, RN      hydrALAzine HCl (APRE 2 epigastrum 0   3 left upper 0   4 left flank 2   5 left lower 1   6 pelvis 0   7 right lower 0   8 right flank 0   9 upper jejunum  1   10 lower jejunum  1   11 upper ileum 0   12 lower ileum  1      PCI 8.  CC 0.     Operating surgeon:  1.   Antwan Pastrana upper midline laparotomy incision was extended from the xiphoid process towards the umbilicus. This was deepened through the skin and subcutaneous tissue. The fascia was incised at the linea alba and the abdomen entered atraumatically.   Minimal adhesions specimen was oriented and sent off for final pathologic analysis. Finally, the lesion in segment 3 was approached.   Again, in a similar way a lateral branch of the left portal vein was within close proximity to the tumor and was supplying that portion of Hemostasis was reassured. A 15 [place] Basia Esa drain was brought through the left abdomen and made to lay adjacent to the section 4B resection bed. It was secured in place with 3-0 nylon. The procedure was concluded. Counts were correct.   The fascia was c

## 2020-04-09 NOTE — PROGRESS NOTES
[facility] HOSP - [facility]    Progress Note    Angelica Brand Patient Status:  Surgery Admit - Inpt    1965 MRN [id number]   Location [facility] UN[facility] Attending Michael Humphrey MD   Hosp Day # 3 PCP Connie Weeks MD fascia tumor, destruction of small bowel mesentery tumor, resction of paracolic gutter peritoneum on 4/6 by Dr. Sigifredo Preston   - pain control with PCA- weaning   - FLD now per surgical onc  - pt/ot  - ambulating    - encouraged IS    Essential hypertension  -

## 2020-04-10 NOTE — PROGRESS NOTES
Surgical Oncology Inpatient Progress Note    Subjective:  POD 4 from exploratory laparotomy, partial hepatectomy segment 3, partial hepatectomy segment 4B, partial hepatectomy segment 7, microwave ablation of liver tumor margin, ultrasound liver, resection  108 106   CO2 27.0 25.0 25.0         Physical Exam:  General: NAD  CV: tachycardic, regular rhythm. Abdomen: hypoactive bowel sounds. abd soft, mildly distended. . Incision site CDI. Scant serosanguinous drainage from DENNIS drain.    Extremities: Warm, POD 4 from Exploratory laparotomy, partial hepatectomy segment 3, partial hepatectomy segment 4B, partial hepatectomy segment 7, microwave ablation of liver tumor margin, ultrasound liver, resection of portion of right hemidiaphragm, greater omentectomy, r

## 2020-04-10 NOTE — PAYOR COMM NOTE
--------------  CONTINUED STAY REVIEW------REQUESTING ADDITIONAL DAY 4/10      Payor: Carolyn JEAN  Subscriber #:  [id number]  Authorization Number: [id number]    Admit date: 4/6/20  Admit time: 26    Admitting Physician: MD ALEXANDRIA Haile Recent Labs   Lab 04/08/20  0502 04/09/20  0456 04/10/20  0500   RBC 3.77* 3.95* 4.08*   HGB 10.4* 10.8* 11.1*   HCT 33.5* 35.2* 35.6*   MCV 88.9 89.1 87.3   MCH 27.6 27.3 27.2   MCHC 31.0 30.7* 31.2   RDW 15.4* 15.1* 15.5*   NEPRELIM  --  4.76  --    WBC 4. Pneumoperitoneum in the subphrenic regions.  Localized pneumoperitoneum in the left anterior lateral peritoneal cavity superficial to the partial colectomy.    5. Simple cyst lower pole left kidney. .  6. Chest findings reported separately on chest CT ex 4/9/2020 0940 Given 40 mg Subcutaneous (Right Upper Abdomen) Enma Lopez, FISH      famoTIDine (PEPCID) tab 20 mg     Date Action Dose Route User    4/10/2020 0844 Given 20 mg Oral Ghazal Baltazar RN    4/9/2020 2020 Given 20 mg Oral Roe Gomez,

## 2020-04-10 NOTE — PROGRESS NOTES
[facility] HOSP - [facility]    Progress Note    Kesha Shaw Patient Status:  Surgery Admit - Inpt    1965 MRN [id number]   Location [address] UNIT Attending John Lee MD   Hosp Day # 4 PCP Maribeth Parish MD changes   - empiric abx per surgical onc    Essential hypertension  - monitor  - continue home meds     Chronic back pain  - resume oral meds     DVT: lovenox daily   CODE: Full   DISPO: pending         Results:     Lab Results   Component Value Date    WB Ct Chest Pain/pe (iv Only) (cpt=71260)    Result Date: 4/9/2020  CONCLUSION:  1. No evidence of pulmonary embolism.  2. Pneumomediastinum/pneumopericardium and small anterior pericardial effusion presumably related to recent surgery including partial

## 2020-04-10 NOTE — PAYOR COMM NOTE
--------------  CONTINUED STAY REVIEW-------REQUESTING ADDITIONAL DAY 4/10      Payor: Phuc BHANDARI EPO  Subscriber #:  [id number]  Authorization Number: [id number]    Admit date: 4/6/20  Admit time: [facility]    Admitting Physician: Rachel Barfield MD Recent Labs   Lab 04/08/20  0502 04/09/20  0456 04/10/20  0500   RBC 3.77* 3.95* 4.08*   HGB 10.4* 10.8* 11.1*   HCT 33.5* 35.2* 35.6*   MCV 88.9 89.1 87.3   MCH 27.6 27.3 27.2   MCHC 31.0 30.7* 31.2   RDW 15.4* 15.1* 15.5*   NEPRELIM  --  4.76  --    WBC 4. Pneumoperitoneum in the subphrenic regions.  Localized pneumoperitoneum in the left anterior lateral peritoneal cavity superficial to the partial colectomy.    5. Simple cyst lower pole left kidney. .  6. Chest findings reported separately on chest CT ex Date Action Dose Route User    4/9/2020 1917 Given 10 mg Intravenous Sid Jimmy    4/9/2020 1314 Given 10 mg Intravenous Enma Lopez, RN      HYDROmorphone 0.2mg/mL in NS 30 mL (DILAUDID) PCA syringe     Date Action Dose Route User    4/9/202

## 2020-04-10 NOTE — PHYSICAL THERAPY NOTE
PHYSICAL THERAPY TREATMENT NOTE - INPATIENT     Room Number: 462/462-A            Problem List  Principal Problem:    Adenocarcinoma of colon metastatic to liver [facility])  Active Problems:    Essential hypertension      PHYSICAL THERAPY ASSESSMENT     FISH Esposito INPATIENT SHORT FORM - BASIC MOBILITY  How much difficulty does the patient currently have. ..  -   Turning over in bed (including adjusting bedclothes, sheets and blankets)?: None   -   Sitting down on and standing up from a chair with arms (e.g., wheelcha Current Status In progress   Goal #6    Goal #6  Current Status

## 2020-04-10 NOTE — PLAN OF CARE
Problem: Patient Centered Care  Goal: Patient preferences are identified and integrated in the patient's plan of care  Description  Interventions:  - What would you like us to know as we care for you?   - Provide timely, complete, and accurate informatio FALL  Goal: Free from fall injury  Description  INTERVENTIONS:  - Assess pt frequently for physical needs  - Identify cognitive and physical deficits and behaviors that affect risk of falls.   - [place] fall precautions as indicated by assessment.  - Educ preferred communication style  - Implement communication aides and strategies  - Use visual cues when possible  - Listen attentively, be patient, do not interrupt  - Minimize distractions  - Allow time for understanding and response  - Establish method for

## 2020-04-10 NOTE — PLAN OF CARE
MD notified of elevated HR, BP and c/o of dizziness. CT C/A/P ordered, nothing acute shown. Fluids and rate changed per MD. PRN hydralazine and tylenol given with good relief. Midline incision is C/D/I. JPx1 intact and draining.  Dilaudid PCA and PRN oxy IR response  - Implement non-pharmacological measures as appropriate and evaluate response  - Consider cultural and social influences on pain and pain management  - Manage/alleviate anxiety  - Utilize distraction and/or relaxation techniques  - Monitor for op their own health  - Refer to Case Management Department for coordinating discharge planning if the patient needs post-hospital services based on physician/LIP order or complex needs related to functional status, cognitive ability or social support system

## 2020-04-10 NOTE — PROGRESS NOTES
Ct reviewed   No PE  Likely postop changes  Will start emperic zosyn  Dc hydralazine   Monitor     Melinda Arias MD  Complex General Surgical Oncology  [facility]  Pager [phone]  [email or web address]

## 2020-04-11 NOTE — PROGRESS NOTES
Surgical Oncology Inpatient Progress Note    Subjective:  POD 4 from exploratory laparotomy, partial hepatectomy segment 3, partial hepatectomy segment 4B, partial hepatectomy segment 7, microwave ablation of liver tumor margin, ultrasound liver, resection Neurological:  AAOx3, MAEW    CT chest/abdomen/pelvis:  CONCLUSION:   1. No evidence of pulmonary embolism.   2. Pneumomediastinum/pneumopericardium and small anterior pericardial effusion presumably related to recent surgery including partial resection of [facility]  Pager [phone]  [email or web address]

## 2020-04-11 NOTE — PLAN OF CARE
No complaints of pain overnight. Midline incision is C/D/I. JPx1 intact, minimal output. Soft diet, no complaints of nausea/vomiting. No gas or BM but is belching. Ambulated in casey prior to bedtime. Up x1 assist. Voiding freely. Frequent rounding done.  Nathaly Simms Utilize distraction and/or relaxation techniques  - Monitor for opioid side effects  - Notify MD/LIP if interventions unsuccessful or patient reports new pain  - Anticipate increased pain with activity and pre-medicate as appropriate  Outcome: Progressing functional status, cognitive ability or social support system  Outcome: Progressing     Problem: Altered Communication/Language Barrier  Goal: Patient/Family is able to understand and participate in their care  Description  Interventions:  - Assess communi

## 2020-04-11 NOTE — PLAN OF CARE
A&Ox4. Up ad michelle and ambulating in halls. IS performed. Potassium replaced. Pt declines the need for pain meds at this time, stated pain is manageable. Midline staples, PTA CDI. Tolerating regular low fat diet. Pt has BM x1. Dr. Arabella Garza removed Left DENNIS.  Nakia Smith and request assistance  - Assess pain using appropriate pain scale  - Administer analgesics based on type and severity of pain and evaluate response  - Implement non-pharmacological measures as appropriate and evaluate response  - Consider cultural and soc needed  - Consider post-discharge preferences of patient/family/discharge partner  - Complete POLST form as appropriate  - Assess patient's ability to be responsible for managing their own health  - Refer to Case Management Department for coordinating disc

## 2020-04-11 NOTE — DISCHARGE SUMMARY
[facility]    Discharge Summary    Brent De La Cruz Patient Status:  Inpatient    1965 MRN [id number]   Location [facility] 4W/SW/SE Attending Meryle Blood, MD   Hosp Day # 5 PCP Lisa Fernandez MD     Date of Admission: 2020 12/11/2019.  On 12/16/2019, the patient underwent an MRI of the liver which is reviewed in detail below but essentially returned to show the same 2 lesions in segment 4B and 2 which were largely unchanged from a size standpoint.  There was a new 1.7 x 0.6 monitor  - continue home meds      #Chronic back pain  - resume oral meds     Dispo: home      Physical Examination:  Vital Signs:  Blood pressure (!) 154/94, pulse 104, temperature 97.3 °F (36.3 °C), resp. rate 24, height 6' 2\" (1.88 m), weight 273 lb 0. 4/09/2020 at 10:06 PM     Finalized by (CST): Korey Macdonald MD on 4/09/2020 at 10:47 PM          Ct Chest Pain/pe (iv Only) (cpt=71260)    Result Date: 4/9/2020  CONCLUSION:  1. No evidence of pulmonary embolism.  2. Pneumomediastinum/pneumopericardiu 24.0   ALKPHO 72 125* 128*  --    * 202* 112*  --    * 563* 410*  --    BILT 1.0 1.3 1.1  --    TP 6.6 7.2 7.4  --      Lab Results   Component Value Date    INR 1.21 (H) 04/10/2020    INR 1.14 04/09/2020    INR 1.23 (H) 04/08/2020       [address] [facility] [address], [phone], [address] [facility] [address], [address] [facility] [address]    Phone:  [phone]   · acetaminophen 500 MG Tabs  · Amoxicillin-Pot Clavulanate 875-125 MG Tabs  · Enoxaparin Sodium 40 MG/0.4ML

## 2020-04-12 NOTE — PAYOR COMM NOTE
Payor:  [employer] [facility] EPO  Subscriber #:  [id number]  Authorization Number: [id number]    Admit date: 4/6/20  Admit time:  9139  Discharge Date: 4/11/2020

## 2020-04-13 NOTE — TELEPHONE ENCOUNTER
Called pt to schedule post op appt for 4/15/2020 at [facility] 10am.    Pt also states he is eating small, frequent meals; hydrating. Managing pain with RX. BM yesterday. Denies fevers. Agrees to call if any problems.

## 2020-04-15 NOTE — PROGRESS NOTES
Here for post op 4-6-2020; c/o increasing headaches, difficulty sleeping. Reports he is eating, drinking more. Pain 3-4/10 with PO pain RX an hour before this appt.

## 2020-04-15 NOTE — PROGRESS NOTES
[facility]    Patient Name:  Sveta Baer   YOB: 1965   Gender:  Male   Appt Date: 4/15/2020   Provider:  Surinder June MD   Insurance:  Suze Scott  Referring Provider: Dr. Keaton Roldan Briefly, he is a 54year old male with history of adenocarcinoma of the colon with metastases to the liver.  He was taken to the operating room on 4/6/2020 for an exploratory laparotomy, partial hepatectomy segment 3, partial hepatectomy segment 4B, partial The patient was evaluated by Dr. Debby Wright and discussed in tumor board and decision was made to proceed with neoadjuvant chemotherapy.   He completed 12 cycles of FOLFOX with bevacizumab and tolerated treatment fairly well although he did develop some neuropa 1/6/2020: underwent laparoscopic partial colectomy by Dr. Michael Arias. Intraoperatively, he was noted to have a metastatic omental deposit which was excised.  Pathology returned to show moderately differentiated adenocarcinoma with mucinous features 1.7 cm in size •  oxyCODONE HCl 5 MG Oral Tab, Take 1 tablet (5 mg total) by mouth every 4 (four) hours as needed. , Disp: 30 tablet, Rfl: 0  •  amLODIPine Besylate 10 MG Oral Tab, Take 1 tablet (10 mg total) by mouth once daily.  (Patient taking differently: Take 5 mg by Marital status: Single      Spouse name: Not on file      Number of children: 4      Years of education: Not on file      Highest education level: Not on file    Occupational History      Occupation: shipping and rec.      Tobacco Use      Smoking statu Genitourinary: Negative for dysuria and difficulty urinating. Musculoskeletal: Negative for myalgias. Skin: Negative for color change and pallor. Allergic/Immunologic: Negative for immunocompromised state. Neurological: Positive for numbness.  Trenda Curling · Portion of hepatic parenchyma demonstrating a focus of metastatic moderately differentiated adenocarcinoma with mucinous features (2.2 cm in maximum dimension) compatible with the patient's history of primary colonic carcinoma.   · Tumor shows focal exten · Portions of omental fibroadipose tissue (29.0 cm in maximum dimension in aggregate) demonstrating scattered foci of metastatic moderately differentiated adenocarcinoma with mucinous features (largest focus - 1.3 cm in maximum dimension) compatible with t · Portion of skeletal muscle, vascular fibroconnective tissue, and fibroadipose tissue demonstrating multifocal superficial mild to moderate acute and chronic inflammatory infiltrates/exudates, diffuse congestive vascular dilatation with areas of mild nons Explained to him that it is imperative that he proceeds with additional chemotherapy at this point. We discussed in tumor board modifying the regimen in an effort to mitigate exacerbating his neuropathy. He agrees this is reasonable.     Warren Eduardo MD

## 2020-04-17 NOTE — TELEPHONE ENCOUNTER
Spoke with Francisco iXong-- per Dr. Thong Valentino will possibly resume treatment on 5/6/20 four weeks post surgery-- will cancel appt for Monday and  to set up appts in May.

## 2020-05-01 NOTE — H&P
[facility]     Patient Name:  Karen Gill   YOB: 1965   Gender:  Male   Appt Date:  4/6/2020   Provider:  Lily Maradiaga MD   Insurance:  Reford Boeck [facility] EPO      34   Referring The patient was evaluated by Dr. Opal Cruz and discussed in tumor board and decision was made to proceed with neoadjuvant chemotherapy.  He completed 12 cycles of FOLFOX with bevacizumab and tolerated treatment fairly well although he did develop some neuropa 1/6/2020: underwent laparoscopic partial colectomy by Dr. Ed Gerardo.  Intraoperatively, he was noted to have a metastatic omental deposit which was excised.  Pathology returned to show moderately differentiated adenocarcinoma with mucinous features 1.7 cm in size • PORT, INDWELLING, IMP Left     • REMV NASAL FOR BODY,LAT RHINOTOMY   1986     deviated septum   • SHOULDER SURG PROC UNLISTED Right 1989       Reviewed Social History:  Social History    Socioeconomic History      Marital status: Single      Spouse name: Cardiovascular: Negative for chest pain and leg swelling. Gastrointestinal: Negative for nausea, abdominal pain and abdominal distention. Endocrine: Negative for polydipsia and polyuria. Genitourinary: Negative for dysuria and difficulty urinating.

## 2020-05-04 NOTE — TELEPHONE ENCOUNTER
Karla Davis would like to cancel the following appointments:      Safia Cat MD in [address] [place] [address] ([phone]), 5/5/2020  1:30 PM    This was a My Chart message. The patient also have a lab appointment at 5/5/20.

## 2020-05-04 NOTE — TELEPHONE ENCOUNTER
Alycia Persaud called and wanted to put a message in to [place] let her know that he will be here for his appointment tomorrow. He thought it was today and he would need to cancel it but he will be in tomorrow.

## 2020-05-05 NOTE — PROGRESS NOTES
Cancer Center Progress Note    Patient Name: Jaya Espinoza   YOB: 1965   Medical Record Number: [id number]   CSN: [id number]   Consulting Physician: Mariel Richardson MD  Referring Physician(s): Giulia Henry  Date of Visit: 5/05/2020    Chief farnsworth recommended to correlate with current colonoscopy findings. Patient continues to feel well without any symptoms of disease or systemic signs of illness.   Denies any bleeding or bruising symptoms, no chest pain, no dyspnea, no nausea, vomiting abdominal pa 4/6/2020 with Dr. Bonnie Mckinnon. Patient feels that he is still too sore from upper abdominal wound to proceed with systemic chemotherapy. He has continued LE peripheral neuropathy which has now extended to his hands.     Patient denies any signs of post operati Clotting Disorder Neg            Social History:  Social History    Socioeconomic History      Marital status: Single      Spouse name: Not on file      Number of children: 4      Years of education: Not on file      Highest education level: Not on file Back Care: Not Asked        Exercise: Not Asked        Bike Helmet: Not Asked        Seat Belt: Not Asked        Self-Exams: Not Asked    Social History Narrative      Padmini Patel is ; has a long term partner, Spencer Amor, 4 yrs.  Father of 4 children; age arthralgias, muscle weakness. Neurological: Negative for headaches, dizziness, speech problems, gait problems and weakness. +peripheral neuropathy    A comprehensive 14 point review of systems was completed.   Pertinent positives and negatives noted in the PM    CO2 25.0 05/05/2020 12:42 PM    ALKPHO 98 05/05/2020 12:42 PM    AST 11 (L) 05/05/2020 12:42 PM    ALT 19 05/05/2020 12:42 PM     Final Diagnosis: 1/6/2020      Distal transverse colon/descending colon:   · Moderately differentiated adenocarcinoma wi Malignant neoplasm of descending colon ([facility] [address]  (primary encounter diagnosis)  Plan: CEA, CT CHEST+ABDOMEN+PELVIS(ALL CNTRST         ONLY)(CPT=71260/36423), oxyCODONE HCl 5 MG Oral        Tab    (C18.9,  C78.7) Colon cancer metastasized to liver [facility])  Plan: side effect profile. --We have discussed that following 6 months of systemic treatment, liver lesions may not be visible on repeat imaging but a repeat CT scan will be ordered prior to planned surgery.  Pt will also undergo repeat colonoscopy within 1 Dr. Crooks Cea     --pt decided to discontinue chemotherapy in early 3/2020. He felt the side effects from 2nd line treatment were not desirable and wanted to proceed with liver resection procedure.   We discussed that dose alterations could be made in light cancer    --placed a consult for behavioral health in the past        Selma Camejo MD  [address] [facility]  [address] [facility], [place]

## 2020-05-11 NOTE — TELEPHONE ENCOUNTER
Called pt re: CT scan results from 5/9/2020 showing left upper quadrant area of concern which might be peritoneal metastatic disease versus possible postoperative seroma.   Recommended patient for PET CT scan imaging followed by follow-up with me after his

## 2020-05-11 NOTE — TELEPHONE ENCOUNTER
Returned patient's call. States that he is still feeling soreness in his upper abdomen with activity. Assured patient that this is normal and advised him to take it easy. Oxy refilled.  He knows to call back should he have any additional concerns or questio

## 2020-05-18 NOTE — TELEPHONE ENCOUNTER
Pt lm on  re: RTW note. I returned his call. He needs the 4/15/2020 letter revised to show today's date. Send via [address] Done.

## 2020-05-21 NOTE — PROGRESS NOTES
Patient is here for a wound check. He is complaining of minimal itching and occasional pain. The wound was examined, it is healing nicely without evidence of hernia or infection. Patient was reassured.       Josh Alexandra MD  Complex General Surgical On

## 2020-05-26 NOTE — PROGRESS NOTES
Cancer Center Progress Note    Patient Name: Theresa Castillo   YOB: 1965   Medical Record Number: [id number]   CSN: [id number]   Consulting Physician: Nette Wall MD  Referring Physician(s): Ibrahima Watson  Date of Visit: 5/26/2020    Chief farnsworth recommended to correlate with current colonoscopy findings. Patient continues to feel well without any symptoms of disease or systemic signs of illness.   Denies any bleeding or bruising symptoms, no chest pain, no dyspnea, no nausea, vomiting abdominal pa consistent with metastatic disease still present. He underwent liver resection and omental nodules on 4/6/2020 with Dr. Sanya Londono. Patient feels that he has numbness in his upper abdominal wound area; possible from the procedure.  Patient felt that he was no Maternal Aunt 68        bladder cancer; tobacco user   • Glaucoma Neg    • Bleeding Disorders Neg    • Anemia Neg    • Sickle Cell Neg    • Clotting Disorder Neg            Social History:  Social History    Socioeconomic History      Marital status: Keenan Asked        Sleep Concern: Not Asked        Stress Concern: Not Asked        Weight Concern: Not Asked        Special Diet: Not Asked        Back Care: Not Asked        Exercise: Not Asked        Bike Helmet: Not Asked        Seat Belt: Not Asked        S bleeding, and lymphadenopathy,   Musculoskeletal: Negative for myalgias, arthralgias, muscle weakness. Neurological: Negative for headaches, dizziness, speech problems, gait problems and weakness.  +peripheral neuropathy    A comprehensive 14 point review 05/05/2020 12:42 PM    K 3.3 (L) 05/05/2020 12:42 PM     05/05/2020 12:42 PM    CO2 25.0 05/05/2020 12:42 PM    ALKPHO 98 05/05/2020 12:42 PM    AST 11 (L) 05/05/2020 12:42 PM    ALT 19 05/05/2020 12:42 PM     Final Diagnosis: 1/6/2020      Distal tr changes of partial colectomy again noted with colo colonic anastomosis in the left mid abdomen. Again demonstrated is a   1.8 cm nodule just lateral and superior to the anastomosis with SUV max of 5.8.   No abnormal FDG uptake is present around the anastom postoperative reactive free peritoneal fluid.   However, there has been development of a 1.5 x 1.3 cm mesenteric nodule in the left upper quadrant near the colocolonic anastomosis that is suspicious for a new peritoneal metastasis,   although an organizing history and maternal aunt affected by pancreatic cancer presents for evaluation of adenocarcinoma identified in the descending colon and the presence of 2 suspicious lesions in the liver concerning for metastatic disease; segments 4B and segment 3, now wit stable liver lesions. --Pt underwent surgery on 1/6/20 with Dr. Don Peter who performed laparoscopic partial colectomy (splenic flexure) for splenic flexure on 1/6/2020 with Dr. Don Peter.  The patient's primary distal transverse/desceing colon mass was r with pt in 3/2020 that following surgery will likely obtain repeat baseline CT scan, verify he has SKIP, then proceed with surveillance with plans to resume chemotherapy in the future as clinically needed if there are signs of recurrence    --his repeat CEA

## 2020-06-02 NOTE — PHYSICAL THERAPY NOTE
Lvm with pt to confirm new patient appt with Kimberlee Madrid 6/3/20 at 7:00 am.    PHYSICAL THERAPY TREATMENT NOTE - INPATIENT     Room Number: 462/462-A            Problem List  Principal Problem:    Adenocarcinoma of colon metastatic to liver [facility])  Active Problems:    Essential hypertension      PHYSICAL THERAPY ASSESSMENT     Pt seen from another person does the patient currently need. ..   -   Moving to and from a bed to a chair (including a wheelchair)?: A Little   -   Need to walk in hospital room?: A Little   -   Climbing 3-5 steps with a railing?: Total     AM-PAC Score:  Raw Score

## 2020-06-03 NOTE — PROGRESS NOTES
Pt here for C2D1 MVASI + FOLFIRI.   Arrives Ambulating independently, accompanied by Self           Patient reports possible pregnancy since last therapy cycle: Not Applicable    Modifications in dose or schedule: yes, tx had been on hold for surgical proce

## 2020-06-05 NOTE — PROGRESS NOTES
Patient presented with CADD pump connected. Reservoir with 0.8 ml. Disconnected CADD pump, flushed port with 0.9% Normal Saline and established blood return in port. Flushed with 500 units of Heparin and de-accessed port.  Gauze and paper tape applied to po

## 2020-06-17 NOTE — TELEPHONE ENCOUNTER
Letter written. Copy to [facility] for pt p/u. Pt states he still feels discomfort and borderline pain to stomach. OTC Tylenol does not help. Okay to take pain RX for this? Please advise.

## 2020-06-22 NOTE — PROGRESS NOTES
Cancer Center Progress Note    Patient Name: Miles Nieto   YOB: 1965   Medical Record Number: [id number]   CSN: [id number]  Consulting Physician: Adela Rocha MD  Referring Physician(s): Ankur Hooker  Date of Visit: 6/22/2020    Chief co recommended to correlate with current colonoscopy findings. Patient continues to feel well without any symptoms of disease or systemic signs of illness.   Denies any bleeding or bruising symptoms, no chest pain, no dyspnea, no nausea, vomiting abdominal pa disease still present. He underwent liver resection and omental nodules on 4/6/2020 with Dr. Colin Garcia. Interval History  Tayla Mohan presents prior to cycle 3 of FOLFIRI with Avastin.   Patient reports tolerating cycle 2 of chemotherapy well, had one episode 68        pancreatic cancer   • Cancer Maternal Aunt 68        bladder cancer; tobacco user   • Glaucoma Neg    • Bleeding Disorders Neg    • Anemia Neg    • Sickle Cell Neg    • Clotting Disorder Neg            Social History:  Social History    Socioecon Exposure: Not Asked        Hobby Hazards: Not Asked        Sleep Concern: Not Asked        Stress Concern: Not Asked        Weight Concern: Not Asked        Special Diet: Not Asked        Back Care: Not Asked        Exercise: Not Asked        Bike Helmet: weight loss; no reports of fatigue  Eyes: Negative for visual disturbance, irritation and redness. Respiratory: Negative for cough, hemoptysis, chest pain, or dyspnea on exertion.   Gastrointestinal: Negative for dysphagia, odynophagia, nausea, vomiting, c 41.6 06/22/2020 10:39 AM    MCV 84.4 06/22/2020 10:39 AM    MCH 26.0 06/22/2020 10:39 AM    MCHC 30.8 (L) 06/22/2020 10:39 AM    RDW 14.8 06/22/2020 10:39 AM    NEPRELIM 1.70 06/22/2020 10:39 AM    .0 06/22/2020 10:39 AM       Lab Results   Componen activity. CHEST WALL/AXILLA:  1.1 x 2.5 cm right axillary lymph node with SUV max of 4.1. Previously lymph node measured 1.0 x 1.5 cm with no FDG uptake. Additional 7 mm right axillary lymph node with SUV max of 1.5, a new finding.   0.7 x 2.1 cm left descending colon.   Postoperative changes again noted from a partial colectomy with left upper quadrant colocolonic anastomosis, omental nodule resection, resection of left Gerota's fascia tumor, resection of   pericolic gutter peritoneum and destruction of developed in the lateral subcapsular aspect of segment VII that demonstrates similar T2 signal characteristics when compared to the initial MRI   and is therefore suspicious for a small metastasis.   However, this lesion is not well seen after contrast admi reviewed his PET CT scan imaging in clinic at treatment planneing and discussed liver lesions look suspicious for metastatic disease.     --We planned to treat with palliative intent chemotherapy using FOLFOX with bevacizumab for 6 months prior to surgical to plan treat with further palliative intent chemotherapy with FOLFIRI+avastin to begin 4 weeks post surgery.  He consented to treatment following discussion of side effect profile    --pt was 6 weeks post op, when he proceeded with cycle 1 of FOLFIRI+Avast 5/19/20 shows five (5) distinct areas that are hypermetabolic concerning for malignancy     --He is very well aware that based on the amount of metastatic disease currently present on PET scan that we should NOW proceed with chemotherapy.      --Will procee

## 2020-06-23 NOTE — PROGRESS NOTES
Edgra to infusion today for C3 D1 MVASI + FOLFIRI. Arrives Ambulating independently, accompanied by Self. States doing pretty well, usually has stomach upset, which he says he has been having since his surgery as well as the chemo.  Reports eating and  relieved/minimized  understands plan of care  verbalize how to care for self  Progress towards outcome:  unchanged    Education Record    Learner:  Patient  Barriers / Limitations:  None  Method:  Discussion  Outcome:  Shows understanding  Comments: ALIYA simpson

## 2020-07-06 NOTE — PROGRESS NOTES
Cancer Center Progress Note    Patient Name: Jazz Sylvester   YOB: 1965   Medical Record Number: [id number]   CSN: [id number]  Consulting Physician: Reymundo Pennington MD  Referring Physician(s): Zane Castillo  Date of Visit: 7/06/2020    Chief co recommended to correlate with current colonoscopy findings. Patient continues to feel well without any symptoms of disease or systemic signs of illness.   Denies any bleeding or bruising symptoms, no chest pain, no dyspnea, no nausea, vomiting abdominal pa disease still present. He underwent liver resection and omental nodules on 4/6/2020 with Dr. Essie Stock. Interval History  Isac Lemus presents prior to cycle 4 of FOLFIRI with Avastin.   Patient reports tolerating cycle 3 of chemotherapy well, had small mild Diabetes Maternal Aunt    • Cancer Maternal Aunt 73        pancreatic cancer   • Cancer Maternal Aunt 68        bladder cancer; tobacco user   • Glaucoma Neg    • Bleeding Disorders Neg    • Anemia Neg    • Sickle Cell Neg    • Clotting Disorder Neg coffee, soda-32 oz./day        Occupational Exposure: Not Asked        Hobby Hazards: Not Asked        Sleep Concern: Not Asked        Stress Concern: Not Asked        Weight Concern: Not Asked        Special Diet: Not Asked        Back Care: Not Asked fatigue  Eyes: Negative for visual disturbance, irritation and redness. Respiratory: Negative for cough, hemoptysis, chest pain, or dyspnea on exertion.   Gastrointestinal: Negative for dysphagia, odynophagia, nausea, vomiting, change in bowel habits, diar MCH 26.2 07/06/2020 08:58 AM    MCHC 31.2 07/06/2020 08:58 AM    RDW 15.3 (H) 07/06/2020 08:58 AM    NEPRELIM 2.26 07/06/2020 08:58 AM    .0 07/06/2020 08:58 AM       Lab Results   Component Value Date/Time    GLU 93 07/06/2020 08:58 AM    BUN 17 lymph node with SUV max of 4.1. Previously lymph node measured 1.0 x 1.5 cm with no FDG uptake. Additional 7 mm right axillary lymph node with SUV max of 1.5, a new finding. 0.7 x 2.1 cm left axillary   lymph node with SUV max of 1.9.   This is unchanged colectomy with left upper quadrant colocolonic anastomosis, omental nodule resection, resection of left Gerota's fascia tumor, resection of   pericolic gutter peritoneum and destruction of small-bowel mesenteric tumors.   Subhepatic surgical drain has been demonstrates similar T2 signal characteristics when compared to the initial MRI   and is therefore suspicious for a small metastasis. However, this lesion is not well seen after contrast administration and there is no correlate on the recent PET/CT.     2. repeat imaging but a repeat CT scan will be ordered prior to planned surgery.  Pt will also undergo repeat colonoscopy within 1 yr of his diagnosis with Dr. Jose Felix     --pt completed cycle 12 5FU/LV +bevacizumab 11/25/19 (bevacizumab added at c not desirable and wanted to proceed with liver resection procedure.   We discussed that dose alterations could be made in light of side effect profile, but the patient wanted to stop palliative intent chemotherapy    --His care was discussed at our multidis chronic low back pain    ---noted and unchanged per his report; only occasional symptoms      4.) GERD symptoms/voice changes  --improved with omeprazole      5.) Depression related to his cancer    --placed a consult for behavioral health in the past

## 2020-07-07 NOTE — PROGRESS NOTES
Edgar to infusion today for C4 D1 MVASI + FOLFIRI. Arrives Ambulating independently, accompanied by Self. States doing pretty well. Offers no new complaints today. Encouraged increased hydration with hot weather. Pt verbalized understanding. supported/coping well  safe in environment  symptoms relieved/minimized  understands plan of care  verbalize how to care for self  Progress towards outcome:  unchanged    Education Record    Learner:  Patient  Barriers / Limitations:  None  Method:  Discus

## 2020-07-15 NOTE — TELEPHONE ENCOUNTER
Pt lm on  requesting cb. I returned his call; c/o his stomach still feels numb. Worried it is r/t the surgery and possibly went back into chemo too soon. Denies any difficulty eating or drinking. Only c/o nausea following treatment.   OR:  4-6-2020

## 2020-07-21 NOTE — TELEPHONE ENCOUNTER
Rafi Brandon calling to cx infusion today he is not feeling well. He spoke with RN yesterday and she was aware of this.  Thank you Yvonne Sen

## 2020-07-21 NOTE — TELEPHONE ENCOUNTER
I dont see any notes that says the MD or RN is aware that is was not coming to his Chemo appointment today, Can you please let me know if this is true.  Thank you

## 2020-07-23 NOTE — TELEPHONE ENCOUNTER
Left Mr. Covarrubias Benjamin in regards to decision on 7/21 on holding chemotherapy treatment at this time. Palliative care consult was also cancel on day of chemo infusion, so would like to discuss rescheduling and of both appts.  Will hold 8/3 appts until touch

## 2020-07-23 NOTE — TELEPHONE ENCOUNTER
Per Matthias Galeana RN's request, I attempted to reach Pamela De Los Santos. No answer. I left a voice mail message asking him to please return my call so I may do a telephone assessment and we can get his appointments rescheduled.

## 2020-07-23 NOTE — TELEPHONE ENCOUNTER
Thank you, Please let me know the plan when able he is currently scheduled for August 3rd as well. I will wait until  come back before I cancel any future appointments.  Thank you

## 2020-07-23 NOTE — TELEPHONE ENCOUNTER
González Gomez returned my call. He said he has decided he wants to put treatment with chemotherapy on hold. He is not happy with all the side effects. He wants to try essential oils. I told González Gomez that I would forward this message to Dr Clarence Huddleston and his nurse.

## 2020-07-27 NOTE — PATIENT INSTRUCTIONS
-Start Amitryptyline at nighttime  -Stop Gabapentin and Lyrica  -Give us an update in 2 weeks of how you're tolerating the medicine  -Follow up in 2 months

## 2020-07-27 NOTE — PROGRESS NOTES
[address]  NEW PATIENT EVALUATION      Chief Complaint: bilateral hand and feet pain. HISTORY OF PRESENT ILLNESS:   Patient presents with:  Neuropathy: LOV 2/26/20.  States fingertips feel a little better recent round of chemotherapy. He additionally has scheduled surgery for liver mets. He denies any back pain, radiating to the lower extremities, any changes in bowel bladder habits. He denies any changes in strength otherwise.   He does wear protective s previous episodes of low back pain with some radiation in the lower extremities and has had epidural injections however the back has not been bothering him for a long period of time.   He started experiencing numbness tingling sensation in both hands and fe Marin Wang MD at [facility] MAIN OR   • OTHER SURGICAL HISTORY  01/06/2020    laparoscopic partial colectomy, laparoscopic exision of omental mass, primary repair umbilical hernia   • PORT, INDWELLING, IMP Left    • [address] [facility] SOCIAL HISTORY:   Social History    Tobacco Use      Smoking status: Never Smoker      Smokeless tobacco: Never Used      Tobacco comment: rare cigar    Alcohol use:  Yes      Alcohol/week: 1.0 - 2.0 standard drinks      Types: 1 - 2 Cans of beer per Reflexes: 2/4 at L4 and S1  Facet Loading: no specific facet pain  Nithya's test: no SI joint instablitiy  Straight leg raise: negative for radicular pain symptoms  Slump test: negative for pain symptoms for radicular pain symptoms      LABS:     Lab R 3. Essential hypertension    4. Numbness and tingling        Mr. Joselyn Anderson is a pleasant 55-year-old gentleman who presents today for follow-up evaluation of peripheral neuropathy.   Patient endorses that his symptoms have somewhat worsened since the last o

## 2020-07-31 NOTE — TELEPHONE ENCOUNTER
LM encouraging Jose Martin Jordan to keep Monday's appt for labs and Dr Verma. We are aware he does not wish to continue treatment but he should still come in to discuss with Dr Hazel Tamez.

## 2020-08-03 NOTE — PROGRESS NOTES
Cancer Center Progress Note    Patient Name: Josephine Grewal   YOB: 1965   Medical Record Number: [id number]   CSN: [id number]  Consulting Physician: Kellee Becker MD  Referring Physician(s): Spencer Goodpasture  Date of Visit: 8/03/2020    Chief co recommended to correlate with current colonoscopy findings. Patient continues to feel well without any symptoms of disease or systemic signs of illness.   Denies any bleeding or bruising symptoms, no chest pain, no dyspnea, no nausea, vomiting abdominal pa disease still present. He underwent liver resection and omental nodules on 4/6/2020 with Dr. Braden Ozuna. Interval History  Guru Ulrich was due for cycle 5 of FOLFIRI with Avastin two weeks ago when he saw our nurse practitioner, Sina Piedra.  However, he opted agai Relation Age of Onset   • Hypertension Mother    • Thyroid Disorder Mother    • Heart Disorder Mother    • Diabetes Maternal Aunt    • Cancer Maternal Aunt 68        pancreatic cancer   • Cancer Maternal Aunt 68        bladder cancer; tobacco user   • Glau  Service: Not Asked        Blood Transfusions: Not Asked        Caffeine Concern: Yes          coffee daily        Occupational Exposure: Not Asked        Hobby Hazards: Not Asked        Sleep Concern: Not Asked        Stress Concern: Not Asked daily as needed.  Before a meal ), Disp: 30 capsule, Rfl: 11        Review of Systems:    Constitutional: Negative for anorexia, chills, fever, night sweats and weight loss; no reports of fatigue  Eyes: Negative for visual disturbance, irritation and rednes Results   Component Value Date/Time    WBC 5.3 08/03/2020 11:54 AM    RBC 5.08 08/03/2020 11:54 AM    HGB 13.3 08/03/2020 11:54 AM    HCT 42.6 08/03/2020 11:54 AM    MCV 83.9 08/03/2020 11:54 AM    MCH 26.2 08/03/2020 11:54 AM    MCHC 31.2 08/03/2020 11:54 the left lower lobe, unchanged since previous PET-CT study with no FDG uptake. MEDIASTINUM/NI:    Normal.  No pathologic FDG activity. CHEST WALL/AXILLA:  1.1 x 2.5 cm right axillary lymph node with SUV max of 4.1.   Previously lymph node measured 1.0 partial liver resection. CT Chest/abd/pelvis 5/9/2020    CONCLUSION:   1. There is a history of adenocarcinoma of the descending colon.   Postoperative changes again noted from a partial colectomy with left upper quadrant colocolonic anastomosis, om measuring 2.3 x 2.1   cm similarly is more hypoenhancing but otherwise has not significantly changed.   A new 1.0 x 0.6 cm lesion has developed in the lateral subcapsular aspect of segment VII that demonstrates similar T2 signal characteristics when compare disease.     --We planned to treat with palliative intent chemotherapy using FOLFOX with bevacizumab for 6 months prior to surgical resection of the primary colonic mass and regional lymph node evaluation    --Pretreatment CEA was not elevated at 1.9 ng/mL treatment following discussion of side effect profile    --pt was 6 weeks post op, when he proceeded with cycle 1 of FOLFIRI+Avastin, no clinical signs of infection present    --following 3 months of treatment, we had planned to re-evaluate these liver les the amount of metastatic disease currently present on PET scan that we should NOW proceed with chemotherapy. --patient completed 4 cycles of FOLFIRI with avastin; 5-FU bolus has been removed for better tolerability.  He opted out from chemotherapy with

## 2020-08-06 NOTE — TELEPHONE ENCOUNTER
LM to please call central scheduling and r/s your CT that you have scheduled on 8/11 to sometime BEFORE Dr Venessa Gibson appt 8/10. Advised that there is availablility tomorrow and over the weekend for the scan.   Also, unable to r/s Dr Barry Ellington appt as he will b

## 2020-08-21 NOTE — PROGRESS NOTES
Cancer Center Progress Note    Patient Name: Kesha Shaw   YOB: 1965   Medical Record Number: [id number]   CSN: [id number]  Consulting Physician: Philippe Brown MD  Referring Physician(s): Maribeth Parish  Date of Visit: 8/21/2020    Chief co recommended to correlate with current colonoscopy findings. Patient continues to feel well without any symptoms of disease or systemic signs of illness.   Denies any bleeding or bruising symptoms, no chest pain, no dyspnea, no nausea, vomiting abdominal pa disease still present. He underwent liver resection and omental nodules on 4/6/2020 with Dr. Edgardo Comer. Interval History  Joe Nowak was due for cycle 5 of FOLFIRI with Avastin several weeks ago when he saw our nurse practitioner, Boubacar Sheldon.  However, he opted laparoscopic exision of omental mass, primary repair umbilical hernia   • PORT, INDWELLING, IMP Left    • REMV NASAL FOR BODY,LAT RHINOTOMY  1986    deviated septum   • SHOULDER SURG PROC UNLISTED Right 1989       Family Medical History:  Family History Relationship status: Not on file      Intimate partner violence:        Fear of current or ex partner: Not on file        Emotionally abused: Not on file        Physically abused: Not on file        Forced sexual activity: Not on file    Other Topics odynophagia, nausea, vomiting, change in bowel habits, diarrhea, constipation and  +upper abdominal numbness since surgery  Integument: Negative for rash, skin lesions, and pruritus.   Hematologic/lymphatic: Negative for easy bruising, bleeding, and lymphad Component Value Date/Time    GLU 85 08/03/2020 11:54 AM    BUN 12 08/03/2020 11:54 AM    CREATSERUM 1.05 08/03/2020 11:54 AM    GFRNAA 80 08/03/2020 11:54 AM    CA 9.2 08/03/2020 11:54 AM    ALB 3.6 08/03/2020 11:54 AM     08/03/2020 11:54 AM    K maternal aunt affected by pancreatic cancer presents for evaluation of adenocarcinoma identified in the descending colon and the presence of 2 suspicious lesions in the liver concerning for metastatic disease; segments 4B and segment 3, now with a third li 2 stable liver lesions. --Pt underwent surgery on 1/6/20 with Dr. Nilesh Marrero who performed laparoscopic partial colectomy (splenic flexure) for splenic flexure on 1/6/2020 with Dr. Nilesh Marrero.  The patient's primary distal transverse/desceing colon mass was with pt in 3/2020 that following surgery will likely obtain repeat baseline CT scan, verify he has SKIP, then proceed with surveillance with plans to resume chemotherapy in the future as clinically needed if there are signs of recurrence    --his repeat CEA not certain this is 679% consistent with new mets. As all other findings on his scan show is measurable disease is decreasing in size. We have discussed that he has not maintain consistent regular chemotherapy dosing every 2 weeks.   We will continue this

## 2020-09-09 NOTE — PROGRESS NOTES
HPI:    Patient ID: Nader Dias is a 54year old male. Pt presents with some hx of back pains. Does take tylenol at times. Pt had a flare up. Pt denies any trauma or injury. Pt has hx of colon cancer with metastases.  Has had surgery and also chemot (three) times daily. • amLODIPine Besylate 10 MG Oral Tab Take 1 tablet (10 mg total) by mouth once daily.  (Patient taking differently: Take 5 mg by mouth once daily.  ) 90 tablet 1     Allergies:  Dust Mite Extract       OTHER (SEE COMMENTS)    Commen

## 2020-09-11 NOTE — TELEPHONE ENCOUNTER
I phoned Malia Domingo and informed him that I am more than willing to fill out a handicap placard for him due to likely chemotherapy related neuropathy affecting his walking.   I reviewed with the patient that in light of his new lesion seen on recent surveillan

## 2020-09-11 NOTE — PROGRESS NOTES
9/11: Pt submitted paperwork for certificate of disability parking placard. SW provided paperwork to MD cárdenas to review and sign. 9/15: Signed paperwork obtained.  Call placed to the pt to alert that a copy will be left at the  for him to picku

## 2020-09-14 NOTE — TELEPHONE ENCOUNTER
Attempted to call but no answer. If norco is not helping not sure if there is anything else we can offer. If severe pain, urgent care or ER.  Otherwise can follow up to discuss alternative pain options for back pains

## 2020-09-15 NOTE — TELEPHONE ENCOUNTER
Pt returned my call stating he has bloating, intermittent incisional pain and numbness when touching the incision. Reports he has made dietary changes from red meat to more plant based foods; reports feeling slightly better.     Offered appt for evaluation

## 2020-09-17 NOTE — PROGRESS NOTES
[facility]    Patient Name:  Hesham Cleaning   YOB: 1965   Gender:  Male   Appt Date:  9/16/2020   Provider:  Lo Mcgarry MD   Insurance:  [address] [place] [address]  Referring Provider: Dr. Ana Guzman Briefly, he is a 54year old male with history of adenocarcinoma of the colon with metastases to the liver. He was taken to the operating room on 1/6/2020 with Dr. Lambert Pastrana for a laparoscopic segmental colectomy.  He was taken to the operating room on 4/6/2020 f The patient was evaluated by Dr. Vasile Browne and discussed in tumor board and decision was made to proceed with neoadjuvant chemotherapy.   He completed 12 cycles of FOLFOX with bevacizumab and tolerated treatment fairly well although he did develop some neuropa 1/6/2020: underwent laparoscopic partial colectomy by Dr. Trevor Delgado. Intraoperatively, he was noted to have a metastatic omental deposit which was excised.  Pathology returned to show moderately differentiated adenocarcinoma with mucinous features 1.7 cm in size 8/12/2020:  CT c/a/p showed new 1.5 cm hepatic metastases (segment 7), Stable left upper quadrant omental tumor nodule and nodular implant in dependent portion of pelvis, Stable right paramedian nodular thickening interposed between abdominal wall incision • LIVER RESECTION N/A 4/6/2020    Performed by Erendira Freeman MD at [address] [place] [address] MAIN OR   • OTHER SURGICAL HISTORY  01/06/2020    laparoscopic partial colectomy, laparoscopic exision of omental mass, primary repair umbilical hernia   • PORT, INDWELLING, IMP Left Constitutional: Positive for fatigue. Negative for activity change, appetite change, chills, fever and unexpected weight change. HENT: Negative for congestion and trouble swallowing. Eyes: Negative for discharge and redness.    Respiratory: Negative fo Pathology:  Final Diagnosis:      A.   Small bowel peritoneum (nodule); excisional biopsy for frozen section:   · Portion of vascular fibroconnective tissue and fibroadipose tissue demonstrating a focus of metastatic moderately differentiated adenocarcinoma · Tumor shows focal extension to the inked cauterized posterior parenchymal specimen margin over a length of approximately 1.5 mm. · Tumor is in very close proximity to but does not show penetration through the overlying fibrous capsule.   · Scattered foci · Fragments of vascular fibroconnective tissue and fibroadipose tissue demonstrating foci of metastatic moderately differentiated adenocarcinoma with mucinous features (largest focus - 0.7 cm in maximum dimension) compatible with the patient's history of p to sites of surgical resection. The region in segment 4 measures 7.5 x 2.7 cm, and previously measured 8.8 x 4.3 cm. Segment 7 measures 5 x 3.1 cm compared with 6.4 x 4 cm on prior.   Segment 3 2 junction measures 5.2 x 4.4 cm, and previously measured 4. Slight decrease in size to the organizing hematomas/seromas at sites of partial hepatic resection. 5. No evidence for thoracic metastatic disease. 6. Prior granulomatous disease in the chest.  7. Post partial left hemicolectomy.   8. Mild prostate enla

## 2020-09-22 NOTE — TELEPHONE ENCOUNTER
Pt called with update:    Drinking Miralax with slight improvement. Reports stool is not quite runny after almost a week. Thoughts?     Call back:  [phone]

## 2020-09-23 NOTE — PROGRESS NOTES
Reynold Moncada,    I reviewed the scans with radiology. A portion of the anastomosis is somewhat patulous and stool-filled with an approximately 5 cm somewhat apparently fixed area distal to this point.   I do not think that this represents a true stricture but fernando

## 2020-09-28 NOTE — TELEPHONE ENCOUNTER
Pt called stating he never got a return call last week. Reports his pain is better but not gone. Would like a RF of pain meds. Could not remember the name. KENYA in [facility]. Last noted RX for pain from PCP.     Pt is aware  and Hussein Curran

## 2020-09-29 NOTE — TELEPHONE ENCOUNTER
LM on voice mail asking pt to please call for an appointment with Dr Barry Ellington. Advised that we heard from Dr Celestin Orn wanted an appt with Dr Barry Ellington, so we are reaching out to help him make the appt.   Phone number of our office provided and asked that

## 2020-10-01 NOTE — TELEPHONE ENCOUNTER
Noted that pt called and made a port flush appt but not a Dr Jayy Orellana appt.  Called to see if this was an oversight by his or our part and offered an appointment with Dr Jayy Orellana tomorrow but around the 3pm hour, he does not have availability at the time he is

## 2020-10-06 NOTE — TELEPHONE ENCOUNTER
Patient called and left me a voicemail saying he still having stomach issues. I called him back but the call got cut off. I retry to call him but sent me to voicemail. I left him a voicemail to call me back.

## 2020-10-08 NOTE — TELEPHONE ENCOUNTER
Patient was called to let him know that I was able to obtain a abdominal binder. I met the patient on the main level of the hospital to give it to him. Patient was happy and appreciative.

## 2020-10-11 NOTE — ED INITIAL ASSESSMENT (HPI)
Pt to er from home with c/o mid lower abdominal pain since April. Pt state he had colon CA surgery in April and it has been bothering him since then. Pt last BM was today and denies any urinary c/o or N/V/D.  Pt states he just feels bloated and today it got

## 2020-10-11 NOTE — ED PROVIDER NOTES
Patient Seen in: [facility] AND [facility] Emergency Department      History   Patient presents with:  Abdomen/Flank Pain    Stated Complaint: Lower Abd Pain    HPI    53 y/o w/ h/o L hemicolectomy in Jan 2020 and segmental liver resection in April 2020 w/ last as noted in HPI. Constitutional and vital signs reviewed. All other systems reviewed and negative except as noted above.     Physical Exam     ED Triage Vitals [10/10/20 2310]   BP (!) 141/96   Pulse 93   Resp 18   Temp 98 °F (36.7 °C)   Temp src Oral LAVENDER   RAINBOW DRAW LIGHT GREEN   RAINBOW DRAW GOLD            CT abdomen pelvis with IV contrast    IMPRESSION:  No bowel obstruction or inflammation. Appendix is normal. No hydronephrosis or nephrolithiasis. No free air.  Moderate bladder wall thicken Shin Vieira MD  [place] [phone]    Call in 1 day      [address] [facility], Gil Barragan MD  [place] [phone]    Call in 1 day            Medications Prescribed:  Current Discharge Medication List

## 2020-10-14 NOTE — PATIENT INSTRUCTIONS
Constipation management:    1) Miralax one capful with  12 oz of warm water, if no BM in 2 hours, may repeat. If does not work, may take milk of magnesia 30ml with 8 oz of warm prune juice or orange juice. Call if no BM.     2) For maintenance Sennakot-S · signs of infection - fever or chills, cough, sore throat, pain or difficulty passing urine  · signs and symptoms of a blood clot such as breathing problems; changes in vision; chest pain; severe, sudden headache; pain, swelling, warmth in the leg; troubl Tell your doctor or healthcare professional if your symptoms do not start to get better or if they get worse. Do not become pregnant while taking this medicine or for 6 months after stopping it.  Women should inform their doctor if they wish to become preg

## 2020-10-14 NOTE — PROGRESS NOTES
Patient presents today for for interval follow-up. He continues to endorse abdominal pain however this time it is near his umbilicus. On exam, there is a small ventral hernia which is above the level of where he describes the pain.   He has no obstructive

## 2020-10-14 NOTE — PROGRESS NOTES
History of Present Illness:     Maurice Flanagan is a 54year old male here to day for f/u of Malignant neoplasm of descending colon (hcc)  (primary encounter diagnosis)  Colon cancer metastasized to liver (hcc)  Peripheral neuropathy due to chemotherapy ( when he had a good BM with medication prescribed by Dr. Chloe Miranda, he had some relief of pain with BM, states still feels has more stool that needs to come out. Currently pain is 5/10, at worse 7/10. After BM down to 3/10. States eats small portions. Smokeless tobacco: Never Used      Tobacco comment: rare cigar    Alcohol use: Yes      Alcohol/week: 1.0 - 2.0 standard drinks      Types: 1 - 2 Cans of beer per week      Frequency: 2-4 times a month      Comment: special occassions    Drug use:  No frequency. Musculoskeletal: Negative for arthralgias and back pain. Skin: Negative for rash. Neurological: Negative for dizziness and headaches. Hematological: Negative for adenopathy.    Psychiatric/Behavioral: Positive for sleep disturbance (due Avastin secondary to peripheral neuropathy. Patient did complete resection of primary tumor, as well as liver resection of metastatic disease.   Unfortunately the patient developed further progression of his liver metastases and received 5 cycles of FOLFIR of life and that the goal of these therapies that he has received thus far and upcoming therapy is for improvement of his quality of life by controlling his cancer.   Discussed that if he does respond to treatment it may also add length of time to his life metastatic disease, status post subtotal colectomy, omental nodule resection, partial hepatectomy of segments III, IVb, and VII,   microwave ablation of the tumor margin, partial right hemidiaphragmatic resection, left Gerota fascia tumor resection, small 1.2 x 1.3 cm and 1.5 cm in diameter (series 2, images 36 and   39). Additional metastatic lesions are seen in the inferior hepatic tip as segment VI, measuring 1.6 x 1.8 cm, 0.9 x 1.1 cm, and 1.4 x 1.5 cm (series 2, images 44-46).    BILIARY: The gallbladde Incompletely distended without visible calculus. Mild circumferential bladder wall thickening may relate to underdistention. PELVIC NODES: No lymphadenopathy. PELVIC ORGANS: The prostate is borderline enlarged.  Deep pelvic calcifications likely represe does not adequately convey the overall increase in metastatic burden. Remote.    Dictated by (CST): Adelina Martins MD on 10/11/2020 at 5:45 AM   Finalized by (CST): Adelina Martins MD on 10/11/2020 at 6:08 AM     Component      Latest Ref Rng & Units 10/ PHOSPHATASE      45 - 117 U/L  102   Total Bilirubin      0.1 - 2.0 mg/dL  0.2   TOTAL PROTEIN      6.4 - 8.2 g/dL  7.9   Albumin      3.4 - 5.0 g/dL  3.6   Globulin      2.8 - 4.4 g/dL  4.3   A/G Ratio      1.0 - 2.0  0.8 (L)   Patient Fasting?         Aislinn Hauser

## 2020-10-19 PROBLEM — Z51.81 MEDICATION MONITORING ENCOUNTER: Status: ACTIVE | Noted: 2020-01-01

## 2020-10-22 NOTE — PROGRESS NOTES
Medication Education Record: Oral Therapy    Learner:  Patient    Barriers / Limitations:  None    Psychosocial Assessment:  patient psychosocial response appropriate    Diagnosis: Colon cancer       Medication Name Dose/Strength Frequency   Lonsurf  Trifl high calorie/high protein foods (chicken, hard cooked eggs, peanut butter, cheese)  o If nauseated, try dry foods, such as toast, crackers or pretzels; light or bland foods, such as applesauce or oatmeal.    Fluid intake:  o Drink 8-10 cups of liquid a day chills  • Nausea/vomiting not controlled with anti-nausea medications: Unable to drink for 24 hours or have signs of dehydration: tiredness, thirst, dry mouth, dark and decreased amount of urine  • Diarrhea – not controlled with Imodium AD or more than 6 e caregivers are involved, caregivers should wear gloves when administering the medication to protect against exposure. Discard used gloves immediately – do not use for anything else. 4. Wash hands thoroughly before and after contact with this medication. regular laundry detergent. Do not wash soiled garments with hands. If the soiled garments cannot be washed right away, place them in a sealed plastic bag until they can be washed.    3. Absorbable undergarments, or any other items contaminated with chemot Cancer treatment education, including treatment plan, supportive medications, and post-treatment care was provided to the patient.   The patient/support person  was attentive during education, verbalized understanding, all questions were answered and patien

## 2020-10-22 NOTE — PATIENT INSTRUCTIONS
Medication Education Record: Oral Therapy    Learner:  Patient    Barriers / Limitations:  None    Psychosocial Assessment:  patient psychosocial response appropriate    Diagnosis: Colon cancer       Medication Name Dose/Strength Frequency   Lonsurf  Trifl high calorie/high protein foods (chicken, hard cooked eggs, peanut butter, cheese)  o If nauseated, try dry foods, such as toast, crackers or pretzels; light or bland foods, such as applesauce or oatmeal.    Fluid intake:  o Drink 8-10 cups of liquid a day Unable to drink for 24 hours or have signs of dehydration: tiredness, thirst, dry mouth, dark and decreased amount of urine  • Diarrhea – not controlled with Imodium AD or more than 6 episodes in 24 hours  • Constipation –no bowel movement x 3 days, no res administering the medication to protect against exposure. Discard used gloves immediately – do not use for anything else. 4. Wash hands thoroughly before and after contact with this medication.   5. Women of child bearing age, pregnant women or women who hands.  If the soiled garments cannot be washed right away, place them in a sealed plastic bag until they can be washed.    3. Absorbable undergarments, or any other items contaminated with chemotherapy, should be placed in a sealed plastic bag for disposal

## 2020-10-30 NOTE — TELEPHONE ENCOUNTER
Oral chemotherapy tracking. Cycle 1 Day 5. Pt started [facility] 10/26. Pt called and left message to call if having side effects or questions or concerns [phone].

## 2020-11-02 NOTE — CONSULTS
Palliative Care Consult Note     Patient Name: Shant Muhammad   YOB: 1965   Medical Record Number: [id number]   Date of visit: 11/2/2020     Chief Complaint/Reason for Visit:  Patient presents with:  Palliative Care    Reason for Consultation toes; worse at night; takes Gabapentin 800mg QHS when he remembers to take med. PN is manageable per Tayla Mohan.     Weight loss: 60 pound unintentional weight loss in past year; weight prior to cancer diagnosis was 300 lbs; decreased appetite, but he reports colectomy, laparoscopic exision of omental mass, primary repair umbilical hernia   • PORT, INDWELLING, IMP Left    • REMV NASAL FOR BODY,LAT RHINOTOMY  1986    deviated septum   • SHOULDER SURG PROC UNLISTED Right 1989       Allergies:    Dust Mite Extract Affiliation: Believes in God  Ethnicity: -American     Goals of care counseling/advance care planning discussion:   I discussed reason for palliative care consultation.  I discussed the benefits of palliative care to include help with symptom managem (three) times daily as needed for Muscle spasms.  20 tablet 0   • Elastic Bandages & Supports (ABDOMINAL BINDER/ELASTIC XL) Does not apply Misc Abdominal Binder 1 each 0   • Omeprazole 40 MG Oral Capsule Delayed Release Take 1 capsule (40 mg total) by mouth General: No scleral icterus. Conjunctiva/sclera: Conjunctivae normal.   Neck:      Musculoskeletal: Normal range of motion and neck supple. Cardiovascular:      Rate and Rhythm: Normal rate and regular rhythm. Pulses: Normal pulses.       He Agent's Name: Kolton gaytan)  Healthcare Agent's Phone Number: [phone]          Palliative Care Assessment/Plan:  1. Cancer related pain    2. Therapeutic opioid induced constipation    3. Peripheral neuropathy due to chemotherapy ([facility] [address]    4. indicated  -Patient is agreeable to following up with outpatient palliative care  -Jyoti Souleymane tearful during visit when [address] and ACP were discussed;  Jyoti Comer is worried about his health insurance and losing health and life insurance if he is unable to work  -I

## 2020-11-02 NOTE — PATIENT INSTRUCTIONS
For pain-  -Take Hydrocodone 7.5/325mg- 1-2 tablets every 6 hours as needed for pain  -You can try taking Hydrocodone 1 tab in morning, 1 tab in afternoon, and 2 tabs at night  -Call [place] is you are taking more than 6 tabs Hydrocodone/day  -Call Igor on [place]

## 2020-11-02 NOTE — TELEPHONE ENCOUNTER
Sofie Dominguez is returning Caitlin's call, and asking to be called back at the hospital at [phone] Ext [phone]

## 2020-11-05 NOTE — TELEPHONE ENCOUNTER
I spoke with Celena Edgar. He reports cancer related pain and constipation has improved since his palliative care visit on Monday. He takes Norco 7.5/325mg 1 tab in am, 1 tab in afternoon, and 2 tabs at bedtime.  He reports that his pain is better control

## 2020-11-05 NOTE — TELEPHONE ENCOUNTER
I left Tereza Merlin a voice mail message asking him to call me back. I called to check to see how he is doing and his pain level since recent visit.

## 2020-11-09 NOTE — TELEPHONE ENCOUNTER
Pt called due to missed lab appointment this afternoon. Per pt forgot. Pt rescheduled for labs tomorrow. Cycle 1 D15 Lonsurf. Per pt having some diarrhea but, controlled with imodium. Per pt staying hydrated.  Lab appointment scheduled tomorrow  8am. Lab an

## 2020-11-16 NOTE — TELEPHONE ENCOUNTER
I called Lino Marquis. He said he was having abdominal pain today and cancelled his f/u appointment for today. He asked if he could rebook for tomorrow morning. I checked with Elroy Weiss and she agreed. Patient booked for a 8:45 am lab appointment and 9:30 follow up appointment with Elroy Weiss.

## 2020-11-16 NOTE — TELEPHONE ENCOUNTER
Emmy Israel came in today for his appointment and said that he didn't feel well and wanted to cancel. I removed the appointments and he said that he didn't feel well. I don't know if you want to reach out but wanted to document.

## 2020-11-17 NOTE — PROGRESS NOTES
History of Present Illness:     Kesha Shaw is a 54year old male here to day for f/u of Malignant neoplasm of descending colon (hcc)  (primary encounter diagnosis)  Colon cancer metastasized to liver (hcc)  Chemotherapy management, encounter for    A had been diarrhea, now slow started stools softner. Increased abd pain Norco 7.5- 1 tab am, 1 tab mid day and then 2 tabs at hs. Also taking gabapentinn 800 at hs. Able to sleep 5 hr stretch.      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 7 the ED secondary to left lower quadrant abdominal pain, this appears secondary to significant amount of constipation. The pain does improve after bowel movements.   He did have a CT of the abdomen pelvis done at that time which is consistent with progressi manage pain    Follow up 1 week    Data:    PROCEDURE: CT ABDOMEN PELVIS IV CONTRAST NO ORAL (ER)   COMPARISON: PET/CT STANDARD BODY SCAN ([id number]), 4/19/2019, 8:13 AM. [facility], MRI LIVER (W+WO) ([id number]), 12/19/2019, 12:41 PM. [facility]. Oral contrast was not   ingested. FINDINGS:   LUNG BASES: The heart is normal in size. There is dependent subsegmental atelectasis bilaterally. An epicardial lymph node measures 1.5 x 1.0 cm (series 2, image 16), new since 08/12/2020.    L There is no evidence of bowel obstruction. A normal caliber retrocecal appendix is seen without inflammatory manifestations. Postoperative changes of subtotal colectomy are demonstrated with primary colocolonic anastomosis involving the descending colon. apposed to the   ventral abdominal wall, perhaps secondary to adhesion formation. OTHER: No free air or fluid is seen in the abdomen or pelvis.   =====   CONCLUSION:   1. There has been progression of metastatic disease.    2. Numerous new hepatic metasta Lymphocytes Absolute      1.00 - 4.00 x10(3) uL 1.88 1.85   Monocytes Absolute      0.10 - 1.00 x10(3) uL 0.44 0.34   Eosinophils Absolute      0.00 - 0.70 x10(3) uL 0.06 0.05   Basophils Absolute      0.00 - 0.20 x10(3) uL 0.03 0.01   Immature Granulocy

## 2020-11-17 NOTE — PROGRESS NOTES
Patient requested Labs drawn peripherally instead of Port. States he didn't apply lidocaine cream to [facility]. I instructed Jose Martin Jordan next time he comes for Lab we should use his Port.  ( Last time Port was used for Labs was on 10/2)   Jose Martin Jordan verbalized u

## 2020-11-21 NOTE — ED PROVIDER NOTES
Patient Seen in: [facility] AND [facility] Emergency Department      History   Patient presents with:  Constipation    Stated Complaint: abdominal pain     HPI    54-year-old male with history of hypertension, here with complaints of abdominal pain, constipation occassions    Drug use: No             Review of Systems   Constitutional: Negative for fever. HENT: Negative for congestion. Eyes: Negative for visual disturbance. Respiratory: Negative for shortness of breath.     Cardiovascular: Negative for chest 24 hour(s))   RAINBOW DRAW BLUE    Collection Time: 11/21/20  3:18 AM   Result Value Ref Range    Hold Blue Auto Resulted    RAINBOW DRAW LAVENDER    Collection Time: 11/21/20  3:18 AM   Result Value Ref Range    Hold Lavender Auto Resulted    RAINBOW DRAW while in ED:  No results found. CT ABDOMEN PELVIS WITH IV CONTRAST      IMPRESSION:  -Mild interval progression of the omental and peritoneal carcinomatosis, with additional involvement in the anterior abdominal wall musculature.   Hepatic metastatic d Record Review: I personally reviewed available prior medical records for any recent pertinent discharge summaries, testing, and procedures, and reviewed those reports. Complicating Factors:  The patient already has does not have any pertinent problems on

## 2020-11-21 NOTE — ED INITIAL ASSESSMENT (HPI)
S: No bm x 5 days. B: Patient takes norco for pain, recent CA dx. Hasn't had a bm x 5 days and has severe abdominal pain.

## 2020-11-23 NOTE — TELEPHONE ENCOUNTER
Samra Zuniga for condition update he had been in ER over the weekend for increased abdominal pain, bloating and constipation. Had a CT scan with stool, new ascites. Currently feeling some better no vomiting.  Has apt with Myesha Anaya and Liset Brown for tomor

## 2020-11-24 NOTE — PROGRESS NOTES
History of Present Illness:     Josephine Grewal is a 54year old male here to day for f/u of Malignant neoplasm of descending colon (hcc)  (primary encounter diagnosis)  Colon cancer metastasized to liver (hcc)  Chemotherapy management, encounter for    A can daily as directed. ER this weekend for constipation - disimpacted. He did feel better after large BM. Has not been taking stool softener or Miralax. Increased abd pain, taking Norco 7.5 prn . Also taking gabapentinn 800 at hs.  Able to sle Relation Age of Onset   • Hypertension Mother    • Thyroid Disorder Mother    • Heart Disorder Mother    • Diabetes Maternal Aunt    • Cancer Maternal Aunt 68        pancreatic cancer   • Cancer Maternal Aunt 68        bladder cancer; tobacco user   • Glau tablet, Rfl: 1        Review of Systems:  Review of Systems   Constitutional: Positive for fatigue. Negative for appetite change, diaphoresis, fever and unexpected weight change.    HENT:          No URI symptoms   Respiratory: Positive for shortness of radha tenderness with palpation. Extremities: No edema or calf tenderness. Neurological: Grossly intact.      Assessment:  Malignant neoplasm of descending colon (hcc)  (primary encounter diagnosis)  Colon cancer metastasized to liver (hcc)  Chemotherapy manag nausea)    Delay x 1 week cycle 2     Resume at decreased dose 3 tabs bid Plan to start  11/30.  Instructed to take ondansetron prior to Lonsurf    Pt to call in by Dec 7 for update so I can order refill     Call placed to social service for [facility]. FINDINGS:          LIVER:  Hypoattenuating hepatic lesions which have increased in size and number as follows:  Right lobe hepatic lesion abutting the IVC (image 27 series 2) measures 52 x 29 mm and previously 28 x 26 mm.   Right inferior hepa stool in the colon. Partial colectomy with anastomosis in region of descending colon. No obstruction, bowel wall thickening, or mesenteric mass.    ABDOMINAL WALL:      Enlarged right rectus abdominus muscle with a 42 x 21 mm lesion image 54 series 2 unch 10(3)uL 284.0 255.0   Prelim Neutrophil Abs      1.50 - 7.70 x10 (3) uL 3.04 2.64   Neutrophils Absolute      1.50 - 7.70 x10(3) uL 3.04 2.64   Lymphocytes Absolute      1.00 - 4.00 x10(3) uL 1.88 1.85   Monocytes Absolute      0.10 - 1.00 x10(3) uL 0.44 0

## 2020-11-24 NOTE — PATIENT INSTRUCTIONS
For pain:  -Norco 10/325mg- take 1-2 tablets every 4 hours as needed for cancer related pain    For constipation:  -Take stool softener- 1 tab in am and 1 tab at bedtime  -Take Miralax 1/2 to 1 cap full diluted in 4-6 oz of water daily

## 2020-11-24 NOTE — PROGRESS NOTES
Palliative Care Follow-Up Note     Patient Name: Kesha Shaw   YOB: 1965   Medical Record Number: [id number]   Date of visit: 11/24/2020     Chief Complaint/Reason for Visit:  Patient presents with:  Palliative Care    Past Medical History pound weight loss since 11/17/20.  Pt has been trying to drink Ensure (at least 1 daily)    Problem List:  Patient Active Problem List:     Glaucoma suspect of both eyes     Age related cataract     Myopia with astigmatism and presbyopia     Chronic allergi Allergies:    Dust Mite Extract       OTHER (SEE COMMENTS)    Comment:sneezing    Family History:  Family History   Problem Relation Age of Onset   • Hypertension Mother    • Thyroid Disorder Mother    • Heart Disorder Mother    • Diabetes Maternal A Ondansetron HCl (ZOFRAN) 8 MG tablet Take 1 tablet (8 mg total) by mouth every 8 (eight) hours as needed for Nausea. 60 tablet 3   • Trifluridine-Tipiracil 20-8. 19 MG Oral tab Take 4 tablet twice a day on days 1-5, and then days 8-12 in a 28-day cycle.  80 Examination:  Physical Exam  Vitals signs reviewed. Constitutional:       General: He is not in acute distress. Appearance: He is ill-appearing. HENT:      Head: Normocephalic and atraumatic.       Right Ear: External ear normal.      Left Ear: Exte family  Code status: FULL CODE  Have advanced directives been discussed with patient or healthcare power of : Yes        Healthcare Agent Appointed: Yes  Healthcare Agent's Name: Sammi gaytan)  Healthcare Agent's Phone Number: [phone] tabs at bedtime if Miralax and stool softener is not effective- hold if loose stool/diarrhea      CIPN  -Encouraged pt to take Gabapentin 800mg QHS  -Pt states that he does not always remember to take Gabapentin  -Pt [facility] your [facility]. I will continue to follow clinically.     NELLI Billy [facility]  [phone]  11/24/2020

## 2020-11-25 NOTE — PROGRESS NOTES
JOCE informed the pt will be requesting time off work due to his change in condition. SW left voicemail to the pt, requesting callback. JOCE also sent a message via [employer] and included the paperwork regarding [address] [place] [address] of Absence.  Awaiting response to girma

## 2020-12-02 NOTE — PROGRESS NOTES
12/2: Paperwork received for short term disability. Paperwork requesting dates 11/10/2020 to 02/01/2021.   SW drafted paperwork, will need to clarify treatment plans and to verify requested dates with Onc team.   --    12/3: Plan confirmed for paperwork unt

## 2020-12-15 NOTE — TELEPHONE ENCOUNTER
Message noted: Chart reviewed and may refill medication times one 90 day supply as requested with 1 additional refill. Prescription sent to listed pharmacy. Pharmacy to notify patient.  Pt notified through [facility]

## 2020-12-17 NOTE — ED INITIAL ASSESSMENT (HPI)
Patient presents with lower abdominal pain and vomiting x 5 days. 6/10 pain  Last BM 6 days ago. Patient states he is not tolerating water. Hx of colon CA, stopped oral chemo September .

## 2020-12-18 NOTE — ED PROVIDER NOTES
Patient Seen in: [facility] AND [facility] Emergency Department      History   Patient presents with:  Nausea/Vomiting/Diarrhea  Abdomen/Flank Pain    Stated Complaint: Vomiting x5days, abdominal pain, cancer    HPI    26-year-old male with history of hypertensi Alcohol use: Yes      Alcohol/week: 1.0 - 2.0 standard drinks      Types: 1 - 2 Cans of beer per week      Frequency: 2-4 times a month      Comment: special occassions    Drug use:  No             Review of Systems    Positive for stated complaint: Vomitin Abnormality         Status                     ---------                               -----------         ------                     CBC W/ DIFFERENTIAL[[id number]]          Abnormal            Final result                 Please vi

## 2020-12-21 PROBLEM — R52 INTRACTABLE PAIN: Status: ACTIVE | Noted: 2020-01-01

## 2020-12-21 PROBLEM — C18.9 METASTATIC COLON CANCER TO LIVER (HCC): Status: ACTIVE | Noted: 2020-01-01

## 2020-12-21 PROBLEM — C78.7 METASTATIC COLON CANCER TO LIVER (HCC): Status: ACTIVE | Noted: 2020-01-01

## 2020-12-21 PROBLEM — R11.2 NAUSEA AND VOMITING IN ADULT: Status: ACTIVE | Noted: 2020-01-01

## 2020-12-21 PROBLEM — K59.00 CONSTIPATION, UNSPECIFIED CONSTIPATION TYPE: Status: ACTIVE | Noted: 2020-01-01

## 2020-12-21 NOTE — PROGRESS NOTES
Post midnight follow up note. Patient was seen and examined. Pain now controlled on PCA. Nausea coming and going. No vomiting. +lgihtheadedness with movement.    S/p enema with +small bm  CT scan ordered  Continue IVF  Await oncology consult  Discussed sandra

## 2020-12-21 NOTE — ED NOTES
Orders for admission, patient is aware of plan and ready to go upstairs. Any questions, please call ED FISH javier  at extension [phone].    Type of COVID test sent: rapid  COVID Suspicion level: Low    Titratable drug(s) infusing: none  Rate:    LOC at time of

## 2020-12-21 NOTE — PAYOR COMM NOTE
--------------  ADMISSION REVIEW     Payor: Sheree Ortez [facility]  Subscriber #:  [id number]  Authorization Number: [id number]    Admit date: 12/21/20  Admit time: [address]       Admitting Physician: Nu Ornelas MD  Attending Physician:  Jane Alva, at [facility] MAIN OR   • LIVER RESECTION N/A 4/6/2020    Performed by Tracy Sim MD at [facility] OR   • OTHER SURGICAL HISTORY  01/06/2020    laparoscopic partial colectomy, laparoscopic exision of omental mass, primary repair umbilical hernia   • PORT, INDW to person, place, and time. Skin: Skin is warm and dry. Nursing note and vitals reviewed. Differential diagnosis includes intractable abdominal daily cancer pain, severe constipation.       ED Course     Labs Reviewed   BASIC METABOLIC PANEL (8) - Ab seen in the right upper quadrant along the gastroesophageal junction, better evaluated on recent CT. surgical staples along the left colon. No free air on the upright view. Lung bases clear.     Case results were faxed/electronically transmitted at [address]   ATTENDING PHYSICIAN: Mello Mayfield MD   PATIENT ACCOUNT#:   [de-identified]    LOCATION:  [address] [place] [address] RECORD #:   [id number]       YOB: 1965  ADMISSION DATE:       12/21/2020    HISTORY AND PHYSICAL EXAMINATION    DATE OF EX Avastin. He decided he would like to defer chemotherapy as he told me he did not tolerate it well and decided to go straight to surgery.   Dr. Charlie Ferrer performed an exploratory laparotomy with lysis of adhesions and a partial hepatectomy of segment 3, segm apparently tolerated clear liquids in the emergency room and was sent home to follow up with Oncology in the outpatient setting.   He and his wife, though, report that he has been unable to keep anything down for the last 2 or 3 days and was getting extreme hydrocodone/acetaminophen 10/325 one to two tablets every 4 hours as needed for pain, omeprazole 40 mg daily, Zofran 8 mg every 8 hours as needed for nausea, potassium ER 40 mEq daily, trifluridine/tipiracil 20/8.1 mg 4 tablets once a day on day 1 through He does have neuropathy in both of his feet which is not a new issue for him. PSYCHIATRIC:  His mood and affect were somewhat withdrawn but pleasant and very cooperative. SKIN:  Warm and dry, without any significant rashes.   BACK:  No costovertebral angl medications as well. 8.   DVT prophylaxis. Subcutaneous heparin and SCDs. 9.   Elevated liver function tests likely secondary to metastatic disease. Continue to monitor. 10.   Mild anemia of chronic disease. 11.   Intractable nausea, vomiting.   Rule HCl (ZOFRAN) injection 4 mg     Date Action Dose Route User    12/21/2020 1240 Given 4 mg Intravenous Frida Devries RN      0.9% NaCl infusion     Date Action Dose Route User    12/21/2020 0750 New Bag (none) Intravenous Frida Devries RN          REV

## 2020-12-21 NOTE — ED INITIAL ASSESSMENT (HPI)
S: Abdominal pain, colon ca. B: Patient has a hx of colon ca, presents today with abdominal pain and no bm x 2 weeks.

## 2020-12-21 NOTE — CONSULTS
[facility]   Gastroenterology Consultation Note    Michael Ariadne  Patient Status:    Inpatient  Date of Admission:         12/21/2020, Hospital day #0  Attending:   Mei Hinton MD  PCP:     Alec Flower MD    Reason for Consultation: esophageal, gastric or colon cancers    Endoscopy Hx:  - 4/2019 colonoscopy with Dr. Beny Obrien  Postoperative Diagnosis:  1. Sessile descending colon polyp with features concerning for malignancy  2.   Large descending colon pedunculated polyp left undi Glaucoma Neg    • Bleeding Disorders Neg    • Anemia Neg    • Sickle Cell Neg    • Clotting Disorder Neg       reports that he has never smoked.  He has never used smokeless tobacco. He reports current alcohol use of about 1.0 - 2.0 standard drinks of alcoh temperature source Oral, resp. rate 18, height 6' 2\" (1.88 m), weight 212 lb (96.2 kg), SpO2 98 %. Body mass index is 27.22 kg/m².     Gen: chronically ill appearing male patient, NAD +significant other at bedside  HEENT: +some temporal wasting, the sclera carcinomatosis. Increased mild-to-moderate ascites. New 1.6 cm left lower lobe pulmonary nodule. Prior partial colectomy. No evidence of bowel obstruction.       Assessment & Plan   Hesham Cleaning is a 54year old  male patient with P URDHGZ. Thank you for the opportunity to participate in the care of this patient.     [id number] [place] [address] Gastroenterology  12/21/2020

## 2020-12-21 NOTE — ED PROVIDER NOTES
Patient Seen in: [facility] AND [facility] Emergency Department      History   Patient presents with:  Abdomen/Flank Pain  Nausea/Vomiting/Diarrhea    Stated Complaint: abd pain, vomiting    HPI    40-year-old with known metastatic colon cancer with worsening CT week      Frequency: 2-4 times a month      Comment: special occassions    Drug use: No             Review of Systems    Positive for stated complaint: abd pain, vomiting  Other systems are as noted in HPI. Constitutional and vital signs reviewed.       Al within normal limits   CBC W/ DIFFERENTIAL - Abnormal; Notable for the following components:    HGB 12.4 (*)     MCH 25.6 (*)     MCHC 30.4 (*)     All other components within normal limits   LIPASE - Normal   RAPID SARS-COV-2 BY PCR - Normal   CBC WITH DI above.    DD:  12/21/2020/DT:  12/21/2020          MDM      Pt w/ intractable pain. D/w Dr. Rena Unger, will admit pt for pain control and possible palliative care consult. D/w [facility] Hospitalist.  HD stable.   Inpt team can review labs and plain x-ray imag

## 2020-12-21 NOTE — H&P
[facility]    PATIENT'S NAME: Fabi Atiya   ATTENDING PHYSICIAN: Anyi Hughes MD   PATIENT ACCOUNT#:   [de-identified]    LOCATION:  [address] [place] [address] RECORD #:   [id number]       YOB: 1965  ADMISSION DATE:       12/21/2020    H year, the patient received 1 cycle of FOLFIRI, Avastin. He decided he would like to defer chemotherapy as he told me he did not tolerate it well and decided to go straight to surgery.   Dr. Leatha Brenner performed an exploratory laparotomy with lysis of adhesio of bowel obstruction at that time. He apparently tolerated clear liquids in the emergency room and was sent home to follow up with Oncology in the outpatient setting.   He and his wife, though, report that he has been unable to keep anything down for the l for muscle spasms, hydrocodone/acetaminophen 10/325 one to two tablets every 4 hours as needed for pain, omeprazole 40 mg daily, Zofran 8 mg every 8 hours as needed for nausea, potassium ER 40 mEq daily, trifluridine/tipiracil 20/8.1 mg 4 tablets once a da no focal deficit. He does have neuropathy in both of his feet which is not a new issue for him. PSYCHIATRIC:  His mood and affect were somewhat withdrawn but pleasant and very cooperative. SKIN:  Warm and dry, without any significant rashes.   BACK:  No blood pressure medications as well. 8.   DVT prophylaxis. Subcutaneous heparin and SCDs. 9.   Elevated liver function tests likely secondary to metastatic disease. Continue to monitor. 10.   Mild anemia of chronic disease.   11.   Intractable nausea, v

## 2020-12-22 NOTE — CONSULTS
[facility] HOSP - [facility]    Report of Hematology/Oncology Consultation    Quinton Reid Patient Status:  Inpatient    1965 MRN [id number]   Location 453/453-A Attending Marito Guzman MD   Date of admission 2020  3:34 AM   PCP Americo Mcghee, Malignant neoplasm of descending colon [facility])  Staging form: Colon and Rectum, AJCC 8th Edition  - Clinical stage from 10/7/2020: Stage IVC (pM1c) - Signed by Annabella Otto MD on 10/14/2020      Oncology History Overview Note The patient underwent first-time screening colonoscopy with Dr. Conrad Boone on 4/2/19 which revealed the presence of moderately differentiated adenocarcinoma in the descending colon.   He presents with his family at the time of initial consultation, Isac Lemus is s/p 12 cycles of FOLFOX with bevaciczumab (Oxaliplatin discontinued after cycle 7 due to grade 3 pedal peripheral neuropathy).   He underwent repeat PET/CT scan on 12/10/19 showing stable colon lesion at the splenic flexure, new adjacent lymphade Malignant neoplasm of descending colon ([facility] [address]     5/13/2019 -  Hormone Therapy    [facility] OP IV FLUSH  Plan Provider: [No treatment plan provider]     5/14/2019 - 12/9/2019 Chemotherapy    OP mFOLFOX 6 with Bevacizumab (Fluorouracil / Leucovorin / Oxaliplatin •  [COMPLETED] ondansetron HCl (ZOFRAN) injection 4 mg, 4 mg, Intravenous, Once, Jodie Jarquin MD, 4 mg at 12/17/20 1944    •  [COMPLETED] morphINE sulfate (PF) 4 MG/ML injection 4 mg, 4 mg, Intravenous, Once, Jodie Jarquin MD, 4 mg at 12/17/20 1956 • Clotting Disorder Neg        Social History    Tobacco Use      Smoking status: Never Smoker      Smokeless tobacco: Never Used      Tobacco comment: rare cigar    Alcohol use:  Yes      Alcohol/week: 1.0 - 2.0 standard drinks      Types: 1 - 2 Cans of be Hold Lt Green Auto Resulted    RAINBOW DRAW GOLD    Collection Time: 12/21/20  3:48 AM   Result Value Ref Range    Hold Gold Auto Resulted    BASIC METABOLIC PANEL (8)    Collection Time: 12/21/20  3:48 AM   Result Value Ref Range    Glucose 86 70 - 99 mg Eosinophil % 0.1 %    Basophil % 0.5 %    Immature Granulocyte % 0.4 %   RAPID SARS-COV-2 BY PCR    Collection Time: 12/21/20  4:47 AM    Specimen: Nares;  Other   Result Value Ref Range    Rapid SARS-CoV-2 by PCR Not Detected Not Detected   COMP METABOLIC CONCLUSION:  Postoperative changes again noted from a previous partial colectomy. A single mildly dilated small bowel loop has developed in the lower central abdomen with an associated air-fluid level.   These findings could represent a localized ileus alt --Advance directives have been discussed in outpatient, however the patient is still not ready for a DO NOT RESUSCITATE and is a full code.     2) constipation:  --Likely multifactorial  --No evidence of a bowel obstruction  --Component of decreased bowel m

## 2020-12-22 NOTE — PLAN OF CARE
Patient is A&Ox4. Room air. Tele in place. Vitals stable, afebrile. Pain being managed with dilaudid PCA. Abdominal discomfort managed with scheduled reglan & prn zofran, compazine, and reglan. On clear liquid diet. Voiding WNL. No BM overnight.  Up indepen factors contributing to decreased intake, treat as appropriate  - Assist with meals as needed  - Monitor I&O, WT and lab values  - Obtain nutritional consult as needed  - Optimize oral hygiene and moisture  - Encourage food from home; allow for food prefer

## 2020-12-22 NOTE — PLAN OF CARE
Patient alert and oriented. Had small formed BM after 1 enema, given second enema with no BM after. CT scan showed no bowel obstruction. Placed on PCA. GI consulted and will place additional orders after viewing CT. Patient updated on plan of care.  Call li opioid side effects  - Notify MD/LIP if interventions unsuccessful or patient reports new pain  - Anticipate increased pain with activity and pre-medicate as appropriate  Outcome: Progressing     Problem: GASTROINTESTINAL - ADULT  Goal: Minimal or absence Progressing

## 2020-12-22 NOTE — PLAN OF CARE
Pt is alert and oriented x4. Pt up ad michelle, self care. Pt ambulating in halls. Pt c/o pain. Dilaudid PCA in place with moderate relief. Pt c/o nausea alternating between zofran, compazine, and reglan with some relief. Pt also c/o constipation.  Pt given jin adequate hydration with IV or PO as ordered and tolerated  - Nasogastric tube to low intermittent suction as ordered  - Evaluate effectiveness of ordered antiemetic medications  - Provide nonpharmacologic comfort measures as appropriate  - Advance diet as

## 2020-12-22 NOTE — PROGRESS NOTES
[facility]     Gastroenterology Progress Note    Baltraci Jimenezes Patient Status:  Inpatient    1965 MRN [id number]   Location [facility] 4W/SW/SE Attending Justine Porter MD   Hosp Day # 1 PCP MD Charline Sky recommendations from hematology/oncology. No evidence of bowel obstruction. Constipation, nausea and vomiting likely multifactorial given advance metastatic colon cancer with worsening carcinomatosis.  Please see initial consultation 12/21 for full details, CO2 29.0 12/22/2020    GLU 92 12/22/2020    CA 9.3 12/22/2020    ALB 2.6 (L) 12/22/2020    ALKPHO 214 (H) 12/22/2020    BILT 0.9 12/22/2020    TP 9.6 (H) 12/22/2020     (H) 12/22/2020     (H) 12/22/2020    INR 1.21 (H) 04/10/2020    TSH 1.

## 2020-12-22 NOTE — PAYOR COMM NOTE
--------------  CONTINUED STAY REVIEW-------REQUESTING ADDITIONAL DAY 12/22      Payor: Pio JAEN  Subscriber #:  [id number]  Authorization Number: [id number]    Admit date: 12/21/20  Admit time: [address]    Admitting Physician: Temo Milton Malnutrition with a 60-pound weight loss since his diagnosis.  When able to tolerate p.o., would provide with supplements as well.     Hypertension.   - restart amlodipine  - treat pain      Elevated liver function tests likely secondary to metastatic dise CONCLUSION:  Postoperative changes again noted from a previous partial colectomy. A single mildly dilated small bowel loop has developed in the lower central abdomen with an associated air-fluid level.   These findings could represent a localized ileus alt iopamidol (ISOVUE-M) 76 % injection 100 mL     Date Action Dose Route User    12/21/2020 1705 Given 80 mL Intravenous Sigifredo Louis      lidocaine-prilocaine (EMLA) cream     Date Action Dose Route User    12/22/2020 1238 Given (none) Topical Lance Likens, [place]

## 2020-12-22 NOTE — PROGRESS NOTES
[facility] - [facility]    Progress Note    Karen Gill Patient Status:  Inpatient    1965 MRN [id number]   Location [facility] 4W/SW/SE Attending Nayla Navarrete MD   Hosp Day # 1 PCP Leslie Flores MD        Subjective:   Subjective:  Fe 12/22/2020    BUN 12 12/22/2020     (L) 12/22/2020    K 3.8 12/22/2020     12/22/2020    CO2 29.0 12/22/2020    GLU 92 12/22/2020    CA 9.3 12/22/2020    ALB 2.6 (L) 12/22/2020    ALKPHO 214 (H) 12/22/2020    BILT 0.9 12/22/2020    TP 9.6 (H) 1 yet today. GI consulted, patient received relistor last night. Ct stable, no evidence of obstruction and a lot of stool. Abdomen soft. Pt will need good bowel regimen on discharge.     Jailene Hsieh MD

## 2020-12-23 NOTE — PAYOR COMM NOTE
--------------  CONTINUED STAY REVIEW-----REQUESTING ADDITIONAL DAY 12/23      Payor: Guillermo JEAN  Subscriber #:  [id number]  Authorization Number: [id number]    Admit date: 12/21/20  Admit time: [address]    Admitting Physician: Brian Ortiz Malnutrition with a 60-pound weight loss since his diagnosis.  When able to tolerate p.o., would provide with supplements as well.     Hypertension.   - restart amlodipine  - treat pain      Elevated liver function tests likely secondary to metastatic dise 12/22/2020 2310 Given 133 mL Rectal Lori Leone RN      Heparin Sodium (Porcine) 5000 UNIT/ML injection 5,000 Units     Date Action Dose Route User    12/23/2020 0840 Given 5,000 Units Subcutaneous (Left Upper Arm) Nina Jaimes RN    12/22/2020 21 Date Action Dose Route User    12/23/2020 1405 Patch Applied 1 patch Transdermal (Behind Right Ear) Dylan Barajas, FISH      Senna-Docusate Sodium (SENOKOT S) 8.6-50 MG tab 2 tablet     Date Action Dose Route User    12/23/2020 0840 Given 2 tablet Oral Ga

## 2020-12-23 NOTE — PROGRESS NOTES
[facility]    Progress Note    Marcio Yordy Patient Status:  Inpatient    1965 MRN [id number]   Location [facility] 4W/SW/SE Attending Baltazar Benitez MD   Hosp Day # 2 PCP Prabha Murguia MD        Subjective:   Subjective:  St HCT 40.8 12/21/2020    .0 12/21/2020    CREATSERUM 0.86 12/22/2020    BUN 12 12/22/2020     (L) 12/22/2020    K 3.9 12/23/2020     12/22/2020    CO2 29.0 12/22/2020    GLU 92 12/22/2020    CA 9.3 12/22/2020    ALB 2.3 (L) 12/23/2020    A

## 2020-12-23 NOTE — PLAN OF CARE
Patient is A&Ox4. Room air. Tele in place with calls informing that patient's HR was in 140's while up in bathroom. PO metoprolol. Pain being managed with dilaudid PCA.  Nausea/vomiting being managed with scheduled reglan and zofran, compazine, and reglan p measures as appropriate and evaluate response  - Consider cultural and social influences on pain and pain management  - Manage/alleviate anxiety  - Utilize distraction and/or relaxation techniques  - Monitor for opioid side effects  - Notify MD/LIP if inte Enhance eating environment  12/23/2020 0434 by Oscar Cruz RN  Outcome: Progressing     Problem: GASTROINTESTINAL - ADULT  Goal: Achieves appropriate nutritional intake (bariatric)  Description: INTERVENTIONS:  - Monitor for over-consumption  - Identif

## 2020-12-23 NOTE — PLAN OF CARE
Pt A/O x4, hypertensive d/t pain & nausea. Minimal clear liquid intake. Schd Reglan & PRN Zofran administered. Scope patch applied behind R ear. Lidocaine patch to mid-lower back. PO Dilaudid & Dilaudid PCA PRN for pain.  x1 call from tele, pt tachvladimir, episod contributing to over-consumption  Outcome: Progressing     Problem: DECISION MAKING  Goal: Pt/Family able to effectively weigh alternatives and participate in decision making related to treatment and care  Description: INTERVENTIONS:  - Determine when ther patient/family/discharge partner  - Complete POLST form as appropriate  - Assess patient's ability to be responsible for managing their own health  - Refer to Case Management Department for coordinating discharge planning if the patient needs post-hospital consult as needed  - Establish a toileting routine/schedule  - Consider collaborating with pharmacy to review patient's medication profile  Outcome: Not Progressing  Goal: Maintains adequate nutritional intake (undernourished)  Description: INTERVENTIONS:

## 2020-12-23 NOTE — PROGRESS NOTES
[facility]    Progress Note    Rayshawn Rodriguezjoshua Patient Status:  Inpatient    1965 MRN [id number]   Location [facility] 4W/SW/SE Attending Marco Lock MD   Hosp Day # 1 PCP Mi Gerber MD        Subjective:   Subjective:  Pa --Patient with progression through first 2 lines of treatment. Recently started on third line therapy with Lonsurf, which he did not tolerate after week. He therefore stopped the medication.   Once patient recovers from this admission, will have outpatien 12/22/20          Results:     Lab Results   Component Value Date    WBC 7.8 12/21/2020    HGB 12.4 (L) 12/21/2020    HCT 40.8 12/21/2020    .0 12/21/2020    CREATSERUM 0.86 12/22/2020    BUN 12 12/22/2020     (L) 12/22/2020    K 3.8 12/22/202

## 2020-12-23 NOTE — DIETARY NOTE
ADULT NUTRITION INITIAL ASSESSMENT    Pt is at high nutrition risk. Pt meets severe malnutrition criteria.       CRITERIA FOR MALNUTRITION DIAGNOSIS:  Criteria for severe malnutrition diagnosis: chronic illness related to wt loss greater than 10% in 6 mo mineral supplements: none--PTA    - Feeding assistance: meal set up    - Nutrition education: none needed    - Coordination of nutrition care: collaboration with other providers    - Discharge and transfer of nutrition care to new setting or provider: Kolby Hare days)     None         Intake Meeting Needs: No    Food Allergies: No  Cultural/Ethnic/Pentecostal Preferences: Not Obtained    MEDICATIONS: reviewed  • Senna-Docusate Sodium  4 tablet Oral BID   • scopolamine  1 patch Transdermal Q72H   • amLODIPine Besylat

## 2020-12-24 NOTE — DIETARY NOTE
ADULT NUTRITION UPDATE NOTE       PATIENT STATUS: initial 12/23/2020-- Seeing pt due to screened at risk by RN at admission for unintentional wt loss (>34#) and eating poorly due to decreased appetite. Pt admitted with N/V, abd pain, obstipation.  Limited Wt Readings from Last 20 Encounters:  12/23/20 : 96.4 kg (212 lb 9.6 oz)  12/17/20 : 104.3 kg (230 lb)  11/24/20 : 104.3 kg (230 lb)  11/24/20 : 104.6 kg (230 lb 9.6 oz)  11/17/20 : 106.6 kg (235 lb)  11/17/20 : 106.6 kg (235 lb)  11/02/20 : 109.3 kg (

## 2020-12-24 NOTE — IMAGING NOTE
1155 Pt to ultrasound room scouts taken by [facility],[address], [address] Hx taken procedure explained questions answered. Dr. Lila Wallace aware of subq heparin administered in am.     1215 Patient consented.     Pt to get albumin 25% 25 grams no     125 DR GA  Here to

## 2020-12-24 NOTE — PLAN OF CARE
Patient is alert and oriented. Had 1,998 mL removed during paracentesis. Dressing covered with 2x2 gauze and tegaderm- no drainage noted. Per MD, patient now DNAR- no compressions, no intubation. POLST form still to be filled out but order in computer.  Natasha Sewell INTERVENTIONS:  - Encourage pt to monitor pain and request assistance  - Assess pain using appropriate pain scale  - Administer analgesics based on type and severity of pain and evaluate response  - Implement non-pharmacological measures as appropriate and home; allow for food preferences  - Enhance eating environment  Outcome: Progressing  Goal: Achieves appropriate nutritional intake (bariatric)  Description: INTERVENTIONS:  - Monitor for over-consumption  - Identify factors contributing to increased intak INTERVENTIONS:  - Identify barriers to discharge w/pt and caregiver  - Include patient/family/discharge partner in discharge planning  - Arrange for needed discharge resources and transportation as appropriate  - Identify discharge learning needs (meds, wo

## 2020-12-24 NOTE — PROGRESS NOTES
[facility]    Progress Note    Erickson Sapp Patient Status:  Inpatient    1965 MRN [id number]   Location [facility] 4W/SW/SE Attending Precious Weiss MD   Hosp Day # 2 PCP Nayla Workman MD      .Erickson Sapp is a 54 y with loculated ascites, extensive greg involvement as well as pulmonary splenic and hepatic metastases.        Cancer related pain  Pain 4-5/10, using limited PCA hydromorphone  Patient may not tolerate po      Constipation  Patient is receiving relistor Q

## 2020-12-24 NOTE — PLAN OF CARE
Patient is A&Ox4. Room air. Tele in place, calls for elevated HR when up in bathroom or vomiting. PO metoprolol. Pain being managed with dilaudid PCA and PO dilaudid.  Nausea/vomiting being managed with scheduled reglan and zofran, compazine, and reglan prn evaluate response  - Consider cultural and social influences on pain and pain management  - Manage/alleviate anxiety  - Utilize distraction and/or relaxation techniques  - Monitor for opioid side effects  - Notify MD/LIP if interventions unsuccessful or pa Minimal or absence of nausea and vomiting  Description: INTERVENTIONS:  - Maintain adequate hydration with IV or PO as ordered and tolerated  - Nasogastric tube to low intermittent suction as ordered  - Evaluate effectiveness of ordered antiemetic medicati view, family's view, and healthcare provider's view of condition  - Facilitate patient and family articulation of goals for care  - Help patient and family identify pros/cons of alternative solutions  - Provide information as requested by patient/family  -

## 2020-12-24 NOTE — PROGRESS NOTES
[facility]  Palliative Care Follow Up    Quinton Reid  [id number]  Hospital Day #3  Date of Consult: 12/23/20  Patient seen at: [address]    Subjective:      Patient was seen and examined with no one else at the bedside. injection 1-5 Units, 1-5 Units, Subcutaneous, Q6H  •  adult 3 in 1 TPN, , Intravenous, Continuous TPN  •  fentaNYL (DURAGESIC) 25 MCG/HR 1 patch, 1 patch, Transdermal, Q72H  •  Senna-Docusate Sodium (SENOKOT S) 8.6-50 MG tab 4 tablet, 4 tablet, Oral, BID GLU 97 12/24/2020    CA 9.0 12/24/2020    ALB 2.3 (L) 12/23/2020    ALKPHO 161 (H) 12/23/2020    BILT 0.5 12/23/2020    TP 8.6 (H) 12/23/2020    AST 82 (H) 12/23/2020     (H) 12/23/2020    PSA 0.8 11/02/2016    MG 2.6 12/24/2020    PHOS 4.1 12/24 Assist  Total Care Reduced Drowsy/confused   20 Bedbound Extensive Disease  Can’t do any work Max Assist  Total Care Minimal Drowsy/confused   10 Bedbound/coma Extensive Disease  Can’t do any work  coma  Max Assist  Total Care Mouth care Drowsy or coma   0 body  ADD Nursing order to document location of Fentanyl after examining it is secure on the body Q shift  ADD Nursing order to hold on Dilaudid doses if Ane Ponto is sleepy or drowsy  CONTINUE with current anti-nausea medications - per RN, Ane Ponto responded

## 2020-12-24 NOTE — PROGRESS NOTES
[place] [facility] - [facility]    Progress Note    Bloomfield Poor Patient Status:  Inpatient    1965 MRN [id number]   Location [facility] 4W/SW/SE Attending Johnny Deras MD   Hosp Day # 3 PCP Chelsea Gill MD       Subjective:     Pt notes n unlikely. Pt and fiance are aware of this. - pt says he wants to be DNR. Order entered. - pt considering hospice consult but not now.   - pt agrees to start TPN  - CT without obstruction  - Pt had bm after enema x1 shortly after admit  - GI was [employer]

## 2020-12-24 NOTE — PROGRESS NOTES
[facility]    Progress Note    Ketty Coppola Patient Status:  Inpatient    1965 MRN [id number]   Location [facility] 4W/SW/SE Attending Candance Lewis, MD   Hosp Day # 3 PCP Jen Rebolledo MD       Ketty Coppola is a 54 y progression. He has not tolerated 3rd line therapy with Lonsurf. CT scan documents carcinomatosis with tumor implants with loculated ascites, extensive greg involvement as well as pulmonary splenic and hepatic metastases.    Less than 1 liter of asciti

## 2020-12-25 PROBLEM — G89.3 CANCER RELATED PAIN: Status: ACTIVE | Noted: 2020-01-01

## 2020-12-25 NOTE — PLAN OF CARE
Alyse Code is A&Ox4. Pain managed with PCA Dilaudud, otherwise pain denied. TPN initiated through chest port. Accuchecks Q6. Scheduled Reglan for nausea. Clear liquid diet. Self Care. Call light within reach. All needs met at this time.     Problem: Patient Ce interventions unsuccessful or patient reports new pain  - Anticipate increased pain with activity and pre-medicate as appropriate  Outcome: Progressing     Problem: GASTROINTESTINAL - ADULT  Goal: Minimal or absence of nausea and vomiting  Description: INT Pt/Family able to effectively weigh alternatives and participate in decision making related to treatment and care  Description: INTERVENTIONS:  - Determine when there are differences between patient's view, family's view, and healthcare provider's view of responsible for managing their own health  - Refer to Case Management Department for coordinating discharge planning if the patient needs post-hospital services based on physician/LIP order or complex needs related to functional status, cognitive ability o

## 2020-12-25 NOTE — PROGRESS NOTES
[facility]  Palliative Care Follow Up    Quinton Reid  [id number]  Hospital Day #4  Date of Consult: 12/23/20  Patient seen at: [address]    Subjective:      Patient was seen and examined with his Lenin Shin at the bedside Intravenous, TID AC and HS  •  Senna-Docusate Sodium (SENOKOT S) 8.6-50 MG tab 4 tablet, 4 tablet, Oral, BID  •  scopolamine (TRANSDERM-SCOP) patch, 1 patch, Transdermal, Q72H  •  HYDROmorphone HCl (DILAUDID) tab 2 mg, 2 mg, Oral, Q4H PRN  •  lidocaine-men 12/21/2020 2.5 (L)   12/21/2020 2.8 (L)   11/17/2020 3.4       Imaging:  No results found.     Objective/Physical Exam:     Vital Signs: /87 (BP Location: Left arm)   Pulse 89   Temp 98.7 °F (37.1 °C) (Oral)   Resp 16   Ht 6' 2\" (1.88 m)   Wt 212 l discussion:    POLST/CODE STATUS: Luis Jenkins talked with Dr Fabiana De La Torre and requested DNAR CODE STATUS with selective medical [facility]    HCPOA:        Healthcare Agent Appointed: Yes  Healthcare Agent's Name: Carmen Caldwell (significant other)  Healthcare Agent

## 2020-12-25 NOTE — PROGRESS NOTES
[place] [facility] - [facility]    Progress Note    Almendarez Look Patient Status:  Inpatient    1965 MRN [id number]   Location [facility] 4W/SW/SE Attending Precious Weiss MD   Hosp Day # 4 PCP Nayla Workman MD       Subjective:     Pt notes n without obstruction  - Pt had bm after enema x1 shortly after admit  - GI was consulted  - cont Relistor per GI  - continue antiemetics, laxatives, pain meds, scopolamine patch, dilaudid PCA, fentanyl patch per palliative care  - Pt s/p ~2L paracentesis 12

## 2020-12-26 NOTE — PLAN OF CARE
Kely Lozano is A&Ox4. Pain managed with IV Dilaudud/ PO Dilaudid. TPN running through chest port. Accuchecks Q6. Scheduled Reglan and Zofran for nausea. Clear liquid diet. Self Care. Call light within reach. All needs met at this time.     Problem: Patient Cent interventions unsuccessful or patient reports new pain  - Anticipate increased pain with activity and pre-medicate as appropriate  Outcome: Progressing     Problem: GASTROINTESTINAL - ADULT  Goal: Minimal or absence of nausea and vomiting  Description: INT Pt/Family able to effectively weigh alternatives and participate in decision making related to treatment and care  Description: INTERVENTIONS:  - Determine when there are differences between patient's view, family's view, and healthcare provider's view of responsible for managing their own health  - Refer to Case Management Department for coordinating discharge planning if the patient needs post-hospital services based on physician/LIP order or complex needs related to functional status, cognitive ability o

## 2020-12-26 NOTE — PLAN OF CARE
Problem: Patient Centered Care  Goal: Patient preferences are identified and integrated in the patient's plan of care  Description: Interventions:  - What would you like us to know as we care for you?   - Provide timely, complete, and accurate informatio nausea and vomiting  Description: INTERVENTIONS:  - Maintain adequate hydration with IV or PO as ordered and tolerated  - Nasogastric tube to low intermittent suction as ordered  - Evaluate effectiveness of ordered antiemetic medications  - Provide nonphar family's view, and healthcare provider's view of condition  - Facilitate patient and family articulation of goals for care  - Help patient and family identify pros/cons of alternative solutions  - Provide information as requested by patient/family  - Respe needs related to functional status, cognitive ability or social support system  Outcome: Not Progressing   No changes today, continue pain medication as ordered and needed, pt c/o nausea at times, zofran and reglan given as scheduled, pt up walking in casey

## 2020-12-26 NOTE — PLAN OF CARE
Problem: Patient Centered Care  Goal: Patient preferences are identified and integrated in the patient's plan of care  Description: Interventions:  - What would you like us to know as we care for you?   - Provide timely, complete, and accurate informatio vomiting  Description: INTERVENTIONS:  - Maintain adequate hydration with IV or PO as ordered and tolerated  - Nasogastric tube to low intermittent suction as ordered  - Evaluate effectiveness of ordered antiemetic medications  - Provide nonpharmacologic c healthcare provider's view of condition  - Facilitate patient and family articulation of goals for care  - Help patient and family identify pros/cons of alternative solutions  - Provide information as requested by patient/family  - Respect patient/family r status, cognitive ability or social support system  Outcome: Progressing   PCA D/C -ed, medicated for pain as needed, zofran po scheduled, pt up walking in hallway, continue TPN and clear liquid diet.

## 2020-12-27 NOTE — PROGRESS NOTES
[place] [facility] - [facility]    Progress Note    Rayshawn Aragon Patient Status:  Inpatient    1965 MRN [id number]   Location [facility] 4W/SW/SE Attending Chet Neely MD   [facility] Day # 6 PCP Mi Gerber MD       Rayshawn Aragon is a 54 y currently.        Constipation  Patient is receiving relistor Q 48 hours -  increased to Q 24 hours -        Uncontrolled nausea and vomiting   Secondary to extensive metastatic disease and persistent minimal stool since admission  IV reglan and PO zofran,

## 2020-12-27 NOTE — PLAN OF CARE
Leslie Triana is A&Ox4. Pain managed with IV Dilaudud/ PO Dilaudid. Patient spiked temp of 101 with evening vitals, MD made aware, Tylenol was administered, Blood cultures drawn, and UA w/ culture collected. TPN running through chest port. Accuchecks Q6.  Schedul evaluate response  - Consider cultural and social influences on pain and pain management  - Manage/alleviate anxiety  - Utilize distraction and/or relaxation techniques  - Monitor for opioid side effects  - Notify MD/LIP if interventions unsuccessful or pa treat as appropriate  - Monitor I&O, WT and lab values  - Obtain nutritional consult as needed  - Evaluate psychosocial factors contributing to over-consumption  Outcome: Progressing     Problem: DECISION MAKING  Goal: Pt/Family able to effectively weigh a care, etc)  - Arrange for interpreters to assist at discharge as needed  - Consider post-discharge preferences of patient/family/discharge partner  - Complete POLST form as appropriate  - Assess patient's ability to be responsible for managing their own he unable to assess

## 2020-12-27 NOTE — PROGRESS NOTES
[facility]    Progress Note    Alec Monreal Patient Status:  Inpatient    1965 MRN [id number]   Location [facility] 4W/SW/SE Attending Kayleen Daniel, [address] Day # 6 PCP Madi Campbell MD       Subjective:     Nausea imp after admit  - GI was consulted  - cont Relistor per GI  - continue antiemetics, laxatives, pain meds, scopolamine patch, fentanyl patch per palliative care. Pt on senna-s 4 tabs bid.  - off dilaudid pca  - add lactulose prn.    If no BM by tomorrow can t

## 2020-12-27 NOTE — PLAN OF CARE
Pt is A&Ox4, VSS on room air. Complaining of mild abdominal pain, declining medication. Port in place, good blood return, TPN infusing, Q6 accucheck.  Pt nauseous and vomiting throughout the day, controlled with PRN compazine, scheduled Reglan, and schedule response  - Implement non-pharmacological measures as appropriate and evaluate response  - Consider cultural and social influences on pain and pain management  - Manage/alleviate anxiety  - Utilize distraction and/or relaxation techniques  - Monitor for op system  Outcome: Progressing     Problem: GASTROINTESTINAL - ADULT  Goal: Minimal or absence of nausea and vomiting  Description: INTERVENTIONS:  - Maintain adequate hydration with IV or PO as ordered and tolerated  - Nasogastric tube to low intermittent s INTERVENTIONS:  - Determine when there are differences between patient's view, family's view, and healthcare provider's view of condition  - Facilitate patient and family articulation of goals for care  - Help patient and family identify pros/cons of alter

## 2020-12-27 NOTE — PROGRESS NOTES
[facility]    Progress Note    Miles Nieto Patient Status:  Inpatient    1965 MRN [id number]   Location [facility] 4W/SW/SE Attending Evelyn Murillo MD   Hosp Day # 5 PCP Ankur Hooker MD       Subjective:     Pt with na without obstruction  - Pt had bm after enema x1 shortly after admit  - GI was consulted  - cont Relistor per GI  - continue antiemetics, laxatives, pain meds, scopolamine patch, fentanyl patch per palliative care.    Pt on senna-s 4 tabs bid.  - off dilaudi

## 2020-12-27 NOTE — CM/SW NOTE
SW is familiar with this pt from the Outpatient Oncology office. Pt from home with sig other, previously self care and working here at [employer]. Pt has been on short term leave. Pt now being seen by Palliative Care/Dr. Maisie Gaucher.  New DNR orders, POLST will n

## 2020-12-28 PROBLEM — E46 MALNUTRITION (HCC): Status: ACTIVE | Noted: 2020-01-01

## 2020-12-28 NOTE — PROGRESS NOTES
[facility]  Palliative Care Follow Up    Tiffanie Keane  [id number]  Hospital Day #7  Date of Consult: 12/23/20  Patient seen at: [address]    Subjective:      Patient was seen and examined with his Valdez Veloz at the be **OR** HYDROmorphone HCl (DILAUDID) 1 MG/ML injection 0.4 mg, 0.4 mg, Intravenous, Q2H PRN **OR** HYDROmorphone HCl (DILAUDID) 1 MG/ML injection 0.8 mg, 0.8 mg, Intravenous, Q2H PRN  •  ondansetron (ZOFRAN-ODT) disintegrating tab 8 mg, 8 mg, Oral, Q6H  • 12/28/2020     (H) 12/28/2020    CA 8.8 12/28/2020    ALB 2.3 (L) 12/25/2020    ALKPHO 122 (H) 12/25/2020    BILT 0.5 12/25/2020    TP 8.8 (H) 12/25/2020    AST 94 (H) 12/25/2020     (H) 12/25/2020    PSA 0.8 11/02/2016    MG 2.1 12/28/2020 Assist Normal or Reduced Full or confused   40 Mainly in bed Extensive Disease  Can’t do any work Mainly Assist Normal or Reduced Full or confused   30 Bedbound Extensive Disease  Can’t do any work Max Assist  Total Care Reduced Drowsy/confused   20 Bedbou oral to IV ATC    RN communication order to locate Fentanyl patch and document location Q shift on nursing notes    4. Psychosocial: Emotional support provided.       5. Disposition: TBD - Mena Ean interested in learning more about home hospice services    6

## 2020-12-28 NOTE — DIETARY NOTE
ADULT NUTRITION INITIAL ASSESSMENT    Pt is at high nutrition risk. Pt meets severe malnutrition criteria.       CRITERIA FOR MALNUTRITION DIAGNOSIS:  Criteria for severe malnutrition diagnosis: chronic illness related to wt loss greater than 10% in 6 mo with 120g protein to  meet 95% calorie and 100% protein based on estimated nutritional needs.       NUTRITION PRESCRIPTION:  Estimated Nutritional Needs:  Calories: 2400 calories/day (25 calories per kg Current wt)  Protein: 104-120 g protein/day (1.2-1.4 g (251 lb)  10/10/20 : 114.3 kg (252 lb)  10/07/20 : 113.9 kg (251 lb)  09/16/20 : 114.7 kg (252 lb 12.8 oz)  09/09/20 : 114.3 kg (252 lb)  08/21/20 : 116.6 kg (257 lb)  08/03/20 : 113.9 kg (251 lb)  07/27/20 : 113.4 kg (250 lb)  07/20/20 : 112.9 kg (249 lb) Monitor: TPN tolerance, adequacy of TPN and for TPN adjustment    - Anthropometric Measurement:      Monitor: wt and wt change     - Nutrition Goals:      halt wt loss, TPN to meet greater than 80% nutrition needs and improved GI status    DIETITIAN FOLLOW

## 2020-12-28 NOTE — CM/SW NOTE
Received MDO for hospice. Referral made to [facility], meeting planned for this afternoon. SW to follow. Addendum 12/30/2020:    Plan for patient to discharge to home today w/ [facility] & family support.     Salvatore Auguste,

## 2020-12-28 NOTE — PLAN OF CARE
Guru Ulrich is A&Ox4. Patient refusing any PO medications, feels its associated with nausea/vomitting. Pain managed with IV Dilaudud. Temps continued, Tylenol offered, patient refused. TPN running through chest port.  Scheduled Reglan and Zofran for nausea, Com Manage/alleviate anxiety  - Utilize distraction and/or relaxation techniques  - Monitor for opioid side effects  - Notify MD/LIP if interventions unsuccessful or patient reports new pain  - Anticipate increased pain with activity and pre-medicate as approp needed  - Evaluate psychosocial factors contributing to over-consumption  Outcome: Progressing     Problem: DECISION MAKING  Goal: Pt/Family able to effectively weigh alternatives and participate in decision making related to treatment and care  Descriptio post-discharge preferences of patient/family/discharge partner  - Complete POLST form as appropriate  - Assess patient's ability to be responsible for managing their own health  - Refer to Case Management Department for coordinating discharge planning if t

## 2020-12-28 NOTE — PAYOR COMM NOTE
--------------  CONTINUED STAY REVIEW--------REQUESTING ADDITIONAL DAYS 12/24, 12/25, 12/26, 12/27 AND 12/28      Payor: Mikaela Beltran [facility]  Subscriber #:  [id number]  Authorization Number: [id number]    Admit date: 12/21/20  Admit time: 4833    NBUEO    (H) 12/23/2020     INR 1.15 12/24/2020     TSH 1.05 10/06/2015      12/21/2020     PSA 0.8 11/02/2016     MG 2.6 12/24/2020     PHOS 4.1 12/24/2020         No results found.         Assessment and Plan:      Metastatic adenocarcinoma of th Pt notes nausea and abd pain are controlled  On TPN- tolerating this  No bm  Passed gas x 1     Objective:   Blood pressure (!) 146/95, pulse 89, temperature 98.1 °F (36.7 °C), temperature source Oral, resp.  rate 18, height 6' 2\" (1.88 m), weight 212 lb 9 - continue antiemetics, laxatives, pain meds, scopolamine patch, dilaudid PCA, fentanyl patch per palliative care  - Pt s/p ~2L paracentesis 12/24 with minimal symptom improvement     Malnutrition with a 60-pound weight loss since his diagnosis.    - TPN to   BILT 0.5 12/25/2020     TP 8.8 (H) 12/25/2020     AST 94 (H) 12/25/2020      (H) 12/25/2020     INR 1.15 12/24/2020     TSH 1.05 10/06/2015      12/21/2020     PSA 0.8 11/02/2016     MG 2.3 12/26/2020     PHOS 3.1 12/26/2020         No resu  1965 MRN [id number]   Location [facility] 4W/SW/SE Attending Ange Mendiola, [address]  Day # 6 PCP Aldo Schaefer MD         Subjective:      Nausea improved. Pt with occasional cough.   Febrile last night.     Objective:   Blood pressure 1 - cont Relistor per GI  - continue antiemetics, laxatives, pain meds, scopolamine patch, fentanyl patch per palliative care.   Pt on senna-s 4 tabs bid.  - off dilaudid pca  - add lactulose prn.   If no BM by tomorrow can try enema.   - Pt s/p ~2L paracente   .0 12/26/2020     CREATSERUM 0.86 12/28/2020     BUN 21 (H) 12/28/2020      12/28/2020     K 4.2 12/28/2020      12/28/2020     CO2 24.0 12/28/2020      (H) 12/28/2020     CA 8.8 12/28/2020     ALB 2.3 (L) 12/25/2020     ALKPHO Severe protein javi Malnutrition with a 60-pound weight loss since his diagnosis.    - TPN as above  - PO intake as able      Hypertension.   - cont amlodipine, metoprolol  - treat pain as above     Elevated liver enzymes secondary to metastatic disease.   12/27/2020 1736 Given 12 mg Subcutaneous (Right Upper Arm) Leandra Ortiz, FISH      Metoclopramide HCl (REGLAN) injection 10 mg     Date Action Dose Route User    12/28/2020 1237 Given 10 mg Intravenous Cass Phillips RN    12/28/2020 0610 Given 10

## 2020-12-28 NOTE — CDS QUERY
Tatum Ordoñez  Dear Doctor:  Jesus    A Registered Dietician evaluated this patient and found them to meet Severe Malnutrition Criteria using the [place] Guidelines based off the following clinical indicators:      *

## 2020-12-28 NOTE — PROGRESS NOTES
[facility]     Gastroenterology Progress Note    Jimmy Dobbs Patient Status:  Inpatient    1965 MRN [id number]   Location [facility] 4W/SW/SE Attending Erasto Carvajal MD   Hosp Day # 7 PCP Ermelinda Canada MD digestive tract result in bilious emesis and constipation. Unfortunately, despite anti-emetics and relistor, his sx persists. I fear there is no optimal therapy to treat these symptoms given his worsening underlying metastatic malignancy.  We will continue Chest Ap Portable  (cpt=71045)    Result Date: 12/27/2020  CONCLUSION:  Negative for radiographically evident acute intrathoracic process.     Dictated by (CST): Gomez Piper MD on 12/27/2020 at 5:41 PM     Finalized by (CST): Gomez Piper MD on 12/27

## 2020-12-28 NOTE — IMAGING NOTE
***Pt to ultrasound room scouts taken by ***    ***Hx taken procedure explained questions answered     ***Patient consented. Pt to get albumin 25% 25 grams yes or no     LUCHO SHIELDS  Here to access- scanning completed and reviewed.       PLATELETS =

## 2020-12-28 NOTE — PROGRESS NOTES
[place] [facility] - [facility]    Progress Note    Almendarez Look Patient Status:  Inpatient    1965 MRN [id number]   Location [facility] 4W/SW/SE Attending Precious Weiss, [address] Day # 7 PCP Nayla Workman MD       Subjective:     + vomiting carcinomatosis. Ascites. - Pt would only maybe be a candidate for 4th line treatment if he improves and is able to dc from hospital.   Pt and fiance are aware of this. - pt says he wants to be DNR.    Order entered.   - pt considering hospice consult but

## 2020-12-29 NOTE — PROGRESS NOTES
Patient will go home hospice tomorrow. DNAR/comfort at this time. Noted to have jerky movements throughout shift. Remains on scheduled zofran and reglan, nausea present during shift. Poor appetite continues.  Ambulating with standby assist, fiance assisting

## 2020-12-29 NOTE — PLAN OF CARE
Problem: Patient Centered Care  Goal: Patient preferences are identified and integrated in the patient's plan of care  Description: Interventions:  - What would you like us to know as we care for you?  I live with my significant other  - Provide timely, c pre-medicate as appropriate  Outcome: Not Progressing     Problem: GASTROINTESTINAL - ADULT  Goal: Minimal or absence of nausea and vomiting  Description: INTERVENTIONS:  - Maintain adequate hydration with IV or PO as ordered and tolerated  - Nasogastric t related to treatment and care  Description: INTERVENTIONS:  - Determine when there are differences between patient's view, family's view, and healthcare provider's view of condition  - Facilitate patient and family articulation of goals for care  - Help pa Department for coordinating discharge planning if the patient needs post-hospital services based on physician/LIP order or complex needs related to functional status, cognitive ability or social support system  Outcome: Not Progressing   Pt nauseated at ti

## 2020-12-29 NOTE — PROGRESS NOTES
[facility]     Gastroenterology Progress Note    Nader Dias Patient Status:  Inpatient    1965 MRN [id number]   Location [facility] 4W/SW/SE Attending Kenney Gandara MD   Hosp Day # 8 PCP Bernard Britton MD it was not effective. Supportive care as much as possible, but unfortunately I do not see any therapy that would adequately treat his condition/symptoms. Agree with hospice. Appreciate palliative care recommendations. Please call if questions.     Cheryle Roup

## 2020-12-29 NOTE — PLAN OF CARE
Problem: Patient Centered Care  Goal: Patient preferences are identified and integrated in the patient's plan of care  Description: Interventions:  - What would you like us to know as we care for you?  I live with my significant other  - Provide timely, c as appropriate  Outcome: Progressing     Problem: GASTROINTESTINAL - ADULT  Goal: Minimal or absence of nausea and vomiting  Description: INTERVENTIONS:  - Maintain adequate hydration with IV or PO as ordered and tolerated  - Nasogastric tube to low interm care  Description: INTERVENTIONS:  - Determine when there are differences between patient's view, family's view, and healthcare provider's view of condition  - Facilitate patient and family articulation of goals for care  - Help patient and family identify planning if the patient needs post-hospital services based on physician/LIP order or complex needs related to functional status, cognitive ability or social support system  Outcome: Progressing     Received patient on bed with an ongoing TPN via left chest

## 2020-12-29 NOTE — PROGRESS NOTES
Patient requested this RN to stop TPN infusing at this time. Patient and fiance discussed at bedside with this RN and are okay with decision. Educated to let this RN know if they change their mind and verbalized agreement.  MD, dietitian, and hospice made a

## 2020-12-29 NOTE — PROGRESS NOTES
[facility]    Progress Note    Cony Al Patient Status:  Inpatient    1965 MRN [id number]   Location [facility] 4W/SW/SE Attending Jolly Brito MD   Hosp Day # 8 PCP Larisa Corbin MD       Subjective:     Minimal vo advanced disease. Poor prognosis. Intractable nausea, vomiting, abdominal pain and obstipation all due to advanced metastatic colon cancer and unlikely to improve. Hepatic mets. Peritoneal carcinomatosis. Ascites.   - Pt would only maybe be a candidate

## 2020-12-29 NOTE — PAYOR COMM NOTE
--------------  CONTINUED STAY REVIEW------REQUESTING ADDITIONAL DAY 12/29      Payor: Jerrica JEAN  Subscriber #:  [id number]  Authorization Number: [id number]    Admit date: 12/21/20  Admit time: [address]    Admitting Physician: Jovon Parks CONCLUSION:  Negative for radiographically evident acute intrathoracic process.     Dictated by (CST): Curtis Foreman MD on 12/27/2020 at 5:41 PM     Finalized by (CST): Curtis Foreman MD on 12/27/2020 at 5:44 PM           Us Ascites (cpt=76705)     Resul Code status:    DNR     Dispo: pending, hospice meeting today.     Luna Minor, NELLI  12/29/2020                       MEDICATIONS ADMINISTERED IN LAST 1 DAY:  acetaminophen (TYLENOL) tab 650 mg     Date Action Dose Route User    12/28/2020 9557 Given 12/29/2020 0447 Given 100 mcg Subcutaneous (Left Lower Abdomen) Rebecca Rodriguez RN    12/28/2020 2112 Given 100 mcg Subcutaneous (Right Lower Abdomen) Valdez FUENTES RN      ondansetron HCl (ZOFRAN) injection 8 mg     Date Action Dose Route U

## 2020-12-29 NOTE — PROGRESS NOTES
[facility]  Palliative Care Follow Up    Ronnald Canavan  [id number]  Hospital Day #8  Date of Consult: 12/23/20  Patient seen at: [address]    Subjective:      Patient was seen and examined with his Irina Phelps at the be 4 tablet, Oral, BID  •  scopolamine (TRANSDERM-SCOP) patch, 1 patch, Transdermal, Q72H  •  HYDROmorphone HCl (DILAUDID) tab 2 mg, 2 mg, Oral, Q4H PRN  •  lidocaine-menthol 4-1 % 1 patch, 1 patch, Transdermal, Daily PRN  •  amLODIPine Besylate (NORVASC) tab evident acute intrathoracic process.     Dictated by (CST): Blanka Heath MD on 12/27/2020 at 5:41 PM     Finalized by (CST): Blanka Heath MD on 12/27/2020 at 5:44 PM          Us Ascites (cpt=76705)    Result Date: 12/28/2020  CONCLUSION: Mild-to-moder Care Assessment     Goals of Care Discussions: Rafi Brandon and Miguelito Watson have elected to chose hospice services at home. They have signed consents and [place].  He does not want me to add to his medications at this time however Ronda CARRILLO might be counseling and coordinating care. We will continue to follow until home with hospice services. Kate Harmon MD   12/29/2020  9:53 AM

## 2020-12-29 NOTE — CM/SW NOTE
JOCE met with pt and sig other Cinthya Dc to discuss hospice services and goals of care. Pt stated that he wants to focus on comfort and pt signed consents for hospice. Pt wants continued meds/nutrition until discharge tomorrow.  Plan for dc at 1pm with kade Farnsworth

## 2020-12-29 NOTE — PROGRESS NOTES
[facility]    Progress Note    Angelica Chiu Patient Status:  Inpatient    1965 MRN [id number]   Location [facility] 4W/SW/SE Attending Shant Schaefer MD   Hosp Day # 7 PCP Connie Weeks MD         Subjective:   Patient SW met with patient to discuss hospice today. Chitra Hauser M.D.   [facility]  Associate Medical Director  [facility], [address] [place] [address]  [phone]      12/28/20

## 2020-12-30 PROBLEM — Z71.89 GOALS OF CARE, COUNSELING/DISCUSSION: Status: ACTIVE | Noted: 2020-01-01

## 2020-12-30 NOTE — PROGRESS NOTES
[facility]    Progress Note    Juni Lade Patient Status:  Inpatient    1965 MRN [id number]   Location [facility] 4W/SW/SE Attending Ariana Oseguera MD   Hosp Day # 8 PCP Angella Brumfield MD         Subjective:   Patient IV reglan and PO zofran, he is refusing zofran entirely. Had trial of octreotide w/o much response.     Patient is DNR Comfort care. Will sign off as being discharged with hospice.   Can have hospice call me if would like for me to be primary for pa

## 2020-12-30 NOTE — PLAN OF CARE
Rajesh Mckoy is discharging home with home hospice today. Patient remains alert and orientated but withdrawn. Patient is accepting of plan of care and is happy to go home. Vital signs remain stable. Nausea and pain management continued.  Tolerating only small am monitor pain and request assistance  - Assess pain using appropriate pain scale  - Administer analgesics based on type and severity of pain and evaluate response  - Implement non-pharmacological measures as appropriate and evaluate response  - Consider cul discharge planning if the patient needs post-hospital services based on physician/LIP order or complex needs related to functional status, cognitive ability or social support system  Outcome: Adequate for Discharge     Problem: GASTROINTESTINAL - ADULT  Go contributing to over-consumption  Outcome: Adequate for Discharge     Problem: DECISION MAKING  Goal: Pt/Family able to effectively weigh alternatives and participate in decision making related to treatment and care  Description: INTERVENTIONS:  - Determin

## 2020-12-30 NOTE — PROGRESS NOTES
[facility]  Palliative Care Follow Up    Vitaly Osbaldo  [id number]  Hospital Day #9  Date of Consult: 12/23/20 Today's date 12/30/2020  Patient seen at: [address]    Subjective:      Patient was seen and examined with no (REGLAN) injection 10 mg, 10 mg, Intravenous, TID AC and HS  •  scopolamine (TRANSDERM-SCOP) patch, 1 patch, Transdermal, Q72H  •  HYDROmorphone HCl (DILAUDID) tab 2 mg, 2 mg, Oral, Q4H PRN  •  lidocaine-menthol 4-1 % 1 patch, 1 patch, Transdermal, Daily P 12/28/2020  CONCLUSION: Mild-to-moderate ascites.     Dictated by (CST): Shant Rubin MD on 12/28/2020 at 3:56 PM     Finalized by (CST): Shant Rubin MD on 12/28/2020 at 3:57 PM            Objective/Physical Exam:     Vital Signs: BP (!) 133/94 STATUS: DNAR CODE STATUS with comfort focused treatments - POLST scanned to EMR    HCPOA:        Healthcare Agent Appointed: Yes  Healthcare Agent's Name: Maye Crigler (significant other)  Healthcare Agent's Phone Number: [phone]     Spiritual needs

## 2020-12-30 NOTE — DISCHARGE SUMMARY
[facility] HOSPITALIST  DISCHARGE SUMMARY     Sveta Baer Patient Status:  Inpatient    1965 MRN [id number]   Location [facility] 4W/SW/SE Attending No att. providers found   [facility] Day # 9 PCP Yasmin Heath MD     DATE OF ADMISSION: 20 lateral subcapsular lesion which was not previously seen. In January of this year, he underwent laparoscopic segmental colectomy and omental deposits consistent with adenocarcinoma were found.   In February of this year, the patient received 1 cycle of FOL scan in November 2020 including progressive hepatic lesions, adenopathy, and peritoneal carcinomatosis, increased mild to moderate ascites, and a new 1.6 cm left lower lobe pulmonary nodule. There was no evidence of bowel obstruction at that time.   He desiree after enema x1 shortly after admit  - cont Relistor per GI- now stopped as no improvement   - continue antiemetics, laxatives, pain meds, scopolamine patch, fentanyl patch per palliative care.   Pt on senna-s 4 tabs bid.  - off dilaudid pca  - add lactulos 2021      Place 1 patch onto the skin every third day.    Quantity:    Refills: 0        CONTINUE taking these medications      Instructions Prescription details   Abdominal Binder/Elastic XL Misc      Abdominal Binder   Quantity: 1 each  Refills: 0     [facility] Hematology and Oncology          FOLLOW UP:  Sakina Cotton MD  [address] [id number]  [phone]      As needed    Judson Beckman, [address] [phone]      As needed    The above plan and follow-up

## 2020-12-30 NOTE — HOSPICE RN NOTE
[facility] to admit this patient this late afternoon. POC was discussed with his life partner . Patient was up to bathroom getting ready to go home. Medications and DME have been ordered. POC discussed with Srinivasa SHIELDS and with Ese Sanon.

## 2020-12-30 NOTE — PLAN OF CARE
Problem: Patient Centered Care  Goal: Patient preferences are identified and integrated in the patient's plan of care  Description: Interventions:  - What would you like us to know as we care for you?  I live with my significant other  - Provide timely, c as appropriate  Outcome: Progressing     Problem: GASTROINTESTINAL - ADULT  Goal: Minimal or absence of nausea and vomiting  Description: INTERVENTIONS:  - Maintain adequate hydration with IV or PO as ordered and tolerated  - Nasogastric tube to low interm care  Description: INTERVENTIONS:  - Determine when there are differences between patient's view, family's view, and healthcare provider's view of condition  - Facilitate patient and family articulation of goals for care  - Help patient and family identify planning if the patient needs post-hospital services based on physician/LIP order or complex needs related to functional status, cognitive ability or social support system  Outcome: Progressing     No acute changes at this time.  Patient still has no bowel

## 2020-12-31 NOTE — PAYOR COMM NOTE
--------------  DISCHARGE REVIEW    Payor: Fransisca Rondon [facility]  Subscriber #:  [id number]  Authorization Number: [id number]    Admit date: 12/21/20  Admit time:  2703  Discharge Date: 12/30/2020  2:02 PM     Admitting Physician: Anshul Gonzalez bevacizumab and tolerated treatment fairly well although he did develop some neuropathy for which oxaliplatin was discontinued after cycle 7.   The patient had restaging CT and PET scanning which revealed nonspecific nodular FDG activity within the splenic abdomen feels bloated and reports that his last bowel movement was 2 weeks ago. He does not recall passing gas in a long time. He reports that his emesis was dark brown and had a foul odor to it. He denies any fever, has had chills occasionally.   He als The patient was admitted to the hospital for further evaluation and monitoring. HOSPITAL COURSE:  Metastatic adenocarcinoma of the colon, progressing. Very advanced disease. Poor prognosis.   Intractable nausea, vomiting, abdominal pain and obstipatio Refills: 0     HYDROmorphone HCl 2 MG Tabs  Commonly known as: DILAUDID      Take 1 tablet (2 mg total) by mouth every 4 (four) hours as needed (PAIN).    Quantity:    Refills: 0     lidocaine-menthol 4-1 % Ptch      Place 1 patch onto the skin daily as n medications were sent to [place] [address] [address], [address] [facility] [address] [facility], [phone], [address], [address] [address]    Phone: [phone]   · lidocaine-menthol 4-1 % Ptch

## 2021-01-01 ENCOUNTER — TELEPHONE (OUTPATIENT)
Dept: HEMATOLOGY/ONCOLOGY | Facility: HOSPITAL | Age: 56
End: 2021-01-01

## 2021-01-01 ENCOUNTER — APPOINTMENT (OUTPATIENT)
Dept: HEMATOLOGY/ONCOLOGY | Facility: HOSPITAL | Age: 56
End: 2021-01-01
Attending: INTERNAL MEDICINE
Payer: COMMERCIAL

## 2021-01-01 ENCOUNTER — APPOINTMENT (OUTPATIENT)
Dept: HEMATOLOGY/ONCOLOGY | Facility: HOSPITAL | Age: 56
End: 2021-01-01
Attending: NURSE PRACTITIONER
Payer: COMMERCIAL

## 2021-01-05 NOTE — TELEPHONE ENCOUNTER
Elkin Valencia calling from [facility] to inform Dr. Mazin Zarate that Veronica Mcduffie has entered into hospice care as of 12/30/20. Thank you.  Mireya

## 2021-02-03 NOTE — TELEPHONE ENCOUNTER
[facility] ([facility]) AT [place] says the patient passed away last night 2/3 around 10 PM at home with family present.

## 2022-05-15 NOTE — PROGRESS NOTES
No new care gaps identified.  Health [facility] Embedded Care Gaps. Reference number: [id number]. 5/15/2022   6:06:07 AM LUCIT  
Patient presents to [facility] ambulatory for follow up bilateral RFA done 8-30. He reports 50% relief. He is able to get out of bed easier and can tolerate more activity. CORNELIUS Thorpe in to see patient. See provider notes.
DISPLAY PLAN FREE TEXT
DISPLAY PLAN FREE TEXT

## 2023-07-12 NOTE — TELEPHONE ENCOUNTER
Patient states Dr. Rik Doe called him last night stating he could not do his surgery due to his MRI results on his liver.   Advised patient Dr. Meera Hunt was unavailable until Friday, and at that time we would speak with him regarding his portion of the surgery, regular regular regular

## 2023-11-30 NOTE — TELEPHONE ENCOUNTER
Called the patient and left a message discussed follow-up with him regarding his care.   I provided the number for the [facility] for him to call me back so we can discuss follow-up of his metastatic colon cancer so that he can hopefully resume systemic no

## (undated) DEVICE — CLIP MED INTNL HMCLP TNTLM

## (undated) DEVICE — BLADE 24 SS SRG STRL

## (undated) DEVICE — SUTURE SILK 0

## (undated) DEVICE — SUTURE SILK 3-0 SH

## (undated) DEVICE — CLIP LG INTNL HMCLP TNTLM ESCP

## (undated) DEVICE — CONTAINER SPEC STR 4OZ GRY LID

## (undated) DEVICE — SUTURE VICRYL 3-0 SH

## (undated) DEVICE — 6 ML SYRINGE LUER-LOCK TIP: Brand: MONOJECT

## (undated) DEVICE — PROXIMATE SKIN STAPLERS (35 WIDE) CONTAINS 35 STAINLESS STEEL STAPLES (FIXED HEAD): Brand: PROXIMATE

## (undated) DEVICE — SPONGE: SPECIALTY PEANUT XR 100/CS: Brand: MEDICAL ACTION INDUSTRIES

## (undated) DEVICE — ENDOPATH ECHELON ENDOSCOPIC LINEAR CUTTER RELOADS, BLUE, 60MM: Brand: ECHELON ENDOPATH

## (undated) DEVICE — UNDYED BRAIDED (POLYGLACTIN 910), SYNTHETIC ABSORBABLE SUTURE: Brand: COATED VICRYL

## (undated) DEVICE — DRAPE,UNDRBUT,WHT GRAD PCH,CAPPORT,20/CS: Brand: MEDLINE

## (undated) DEVICE — SOL  .9 1000ML BAG

## (undated) DEVICE — BLAKE SILICONE DRAIN, 15 FR ROUND, HUBLESS WITH 3/16" TROCAR: Brand: BLAKE

## (undated) DEVICE — ENDOPATH ECHELON ENDOSCOPIC LINEAR CUTTER RELOADS, WHITE, 60MM: Brand: ECHELON ENDOPATH

## (undated) DEVICE — Device

## (undated) DEVICE — TOWEL OR BLU 16X26 STRL

## (undated) DEVICE — DRAPE SHEET LAPCHOLE 124X100X7

## (undated) DEVICE — DEVICE PORT SITE CLOSURE

## (undated) DEVICE — ENCORE® LATEX ACCLAIM SIZE 7.5, STERILE LATEX POWDER-FREE SURGICAL GLOVE: Brand: ENCORE

## (undated) DEVICE — ECHELON FLEX POWERED PLUS ARTICULATING ENDOSCOPIC LINEAR CUTTER , 60MM: Brand: ECHELON FLEX

## (undated) DEVICE — 3M(TM) TEGADERM(TM) TRANSPARENT FILM DRESSING FRAME STYLE 1628: Brand: 3M™ TEGADERM™

## (undated) DEVICE — LIGASURE LAP MARYLAND 37CM

## (undated) DEVICE — Device: Brand: CUSTOM PROCEDURE KIT

## (undated) DEVICE — SUTURE VICRYL 0 J906G

## (undated) DEVICE — MEDI-VAC NON-CONDUCTIVE SUCTION TUBING 6MM X 1.8M (6FT.) L: Brand: CARDINAL HEALTH

## (undated) DEVICE — SUTURE PDS II 1-0 Z881G

## (undated) DEVICE — SUTURE PROLENE 2-0 MH

## (undated) DEVICE — SPONGE LAP 18X18 XRAY STRL

## (undated) DEVICE — SUTURE PDS II 4-0 SH

## (undated) DEVICE — DISPOSABLE SUCTION/IRRIGATOR TUBE SET: Brand: AHTO

## (undated) DEVICE — LEGGINGS SRG 48X31IN CUF STRL

## (undated) DEVICE — SURGICAL SUCTION CONNECTING TUBE WITH MALE CONNECTOR AND SUCTION CLAMP, 2 FT. LONG (.6 M), 5 MM I.D.: Brand: CONMED

## (undated) DEVICE — SOLUTION ENDOSCOPIC ANTI-FOG NON-TOXIC NON-ABRASIVE 6 CUBIC CENTIMETER WITH RADIOPAQUE ADHESIVE-BACKED SPONGE STERILE NOT MADE WITH NATURAL RUBBER LATEX MEDICHOICE: Brand: MEDICHOICE

## (undated) DEVICE — SUTURE PROLENE 4-0 RB-1

## (undated) DEVICE — SUTURE VLOC 90 3-0 9\" 2044

## (undated) DEVICE — A P RESECTION: Brand: MEDLINE INDUSTRIES, INC.

## (undated) DEVICE — 3M™ IOBAN™ 2 ANTIMICROBIAL INCISE DRAPE 6651EZ: Brand: IOBAN™ 2

## (undated) DEVICE — CAUTERY BLADE 2IN INS E1455

## (undated) DEVICE — SUTURE VLOC 90 3-0 9\" 1944

## (undated) DEVICE — INSULATED BLADE ELECTRODE 6.5

## (undated) DEVICE — DRAPE C-ARM UNIVERSAL

## (undated) DEVICE — 3 ML SYRINGE LUER-LOCK TIP: Brand: MONOJECT

## (undated) DEVICE — YANKAUER SUCTION INSTRUMENT NO CONTROL VENT, BULB TIP, CLEAR: Brand: YANKAUER

## (undated) DEVICE — GOWN SURG AERO BLUE PERF LG

## (undated) DEVICE — DRAIN RESERVOIR RELIAVAC 100CC

## (undated) DEVICE — ELEVIEW 10ML AMPOULES SNGL USE

## (undated) DEVICE — INTENDED TO BE USED TO OCCLUDE, RETRACT AND IDENTIFY ARTERIES, VEINS, TENDONS AND NERVES IN SURGICAL PROCEDURES: Brand: STERION®  VESSEL LOOP

## (undated) DEVICE — ACCESS PLATFORM FOR MINIMALLY INVASIVE SURGERY: Brand: GELPOINT® ADVANCED ACCESS PLATFORM

## (undated) DEVICE — TRAY FOLEY BDX 16F STATLOCK

## (undated) DEVICE — Device: Brand: DEFENDO AIR/WATER/SUCTION AND BIOPSY VALVE

## (undated) DEVICE — X-RAY DETECTABLE SPONGES,16 PLY: Brand: VISTEC

## (undated) DEVICE — SUTURE ETHILON 3-0 669H

## (undated) DEVICE — ABDOMINAL BINDER: Brand: DEROYAL

## (undated) DEVICE — MINOR GENERAL: Brand: MEDLINE INDUSTRIES, INC.

## (undated) DEVICE — 12 ML SYRINGE LUER-LOCK TIP: Brand: MONOJECT

## (undated) DEVICE — TROCAR: Brand: KII FIOS FIRST ENTRY

## (undated) DEVICE — VIOLET BRAIDED (POLYGLACTIN 910), SYNTHETIC ABSORBABLE SUTURE: Brand: COATED VICRYL

## (undated) DEVICE — TROCAR: Brand: KII® SLEEVE

## (undated) DEVICE — Device: Brand: SPOT EX ENDOSCOPIC TATTOO

## (undated) DEVICE — NON-ADHERENT PAD PREPACK: Brand: TELFA

## (undated) DEVICE — BLADE 11 SHRP BP SS SRG STRL

## (undated) DEVICE — LIGASURE IMPACT OPEN DEVICE

## (undated) DEVICE — GAMMEX® PI HYBRID SIZE 7.5, STERILE POWDER-FREE SURGICAL GLOVE, POLYISOPRENE AND NEOPRENE BLEND: Brand: GAMMEX

## (undated) DEVICE — SUTURE PROLENE 2-0 SH

## (undated) DEVICE — SOL  .9 3000ML

## (undated) DEVICE — SUTURE PDS II 3-0 SH

## (undated) DEVICE — SUTURE CHROMIC GUT 3-0 SH

## (undated) DEVICE — DRAPE SHEET TRANSVERSE LAP

## (undated) DEVICE — MEDI-VAC NON-CONDUCTIVE SUCTION TUBING: Brand: CARDINAL HEALTH

## (undated) DEVICE — TAPE UMBILICAL

## (undated) DEVICE — FORCEP RADIAL JAW 4

## (undated) DEVICE — SUTURE SILK 2-0 FS

## (undated) DEVICE — SUTURE PDS II 0 CTX

## (undated) DEVICE — CLIP SM INTNL HMCLP TNTLM ESCP

## (undated) DEVICE — DERMABOND LIQUID ADHESIVE

## (undated) DEVICE — [HIGH FLOW INSUFFLATOR,  DO NOT USE IF PACKAGE IS DAMAGED,  KEEP DRY,  KEEP AWAY FROM SUNLIGHT,  PROTECT FROM HEAT AND RADIOACTIVE SOURCES.]: Brand: PNEUMOSURE

## (undated) DEVICE — FLEXIBLE YANKAUER,MEDIUM TIP, NO VACUUM CONTROL: Brand: ARGYLE

## (undated) DEVICE — GAMMEX® NON-LATEX PI ORTHO SIZE 7.5, STERILE POLYISOPRENE POWDER-FREE SURGICAL GLOVE: Brand: GAMMEX

## (undated) DEVICE — SOL  .9 1000ML BTL

## (undated) DEVICE — LINE MNTR ADLT SET O2 INTMD

## (undated) DEVICE — STANDARD HYPODERMIC NEEDLE,POLYPROPYLENE HUB: Brand: MONOJECT

## (undated) DEVICE — SUTURE SILK 0 FSL

## (undated) DEVICE — SNARE CAPTIFLEX MICRO-OVL OLY

## (undated) DEVICE — 3M™ BAIR HUGGER® UNDERBODY BLANKET, FULL ACCESS, 10 PER CASE 63500: Brand: BAIR HUGGER™

## (undated) DEVICE — DRAPE SLUSH/WARMER W/DISC

## (undated) DEVICE — ABSORBABLE HEMOSTAT (OXIDIZED REGENERATED CELLULOSE, U.S.P.): Brand: SURGICEL

## (undated) DEVICE — SUTURE SILK 2-0 SA85H

## (undated) DEVICE — DRAPE CASSETTE X-RAY

## (undated) DEVICE — NEEDLE CONTRAST INTERJECT 25G

## (undated) NOTE — MR AVS SNAPSHOT
After Visit Summary   10/29/2019    Kesha Shaw    MRN: [id number]           Visit Information     Date & Time  10/29/2019  9:30 AM Provider  [facility] [address] [place] [address] Dept.  Phone  [phone]      Your 11/11/2019 10:30 AM [facility] H[facility]ology Oncology    11/12/2019 9:30 AM EM [facility] [address] - Infusion    11/14/2019 10:30 AM EM CC [address] - Infusion    11/22/2019 10:30 AM EM CC not require immediate attention   VIDEO VISITS  Average cost  $35*    e-VISITS  Average cost  $35*      [address]  Monday - Friday  10:00 am - 10:00 pm  Saturday - Sunday  10:00 am - 4:0

## (undated) NOTE — Clinical Note
Dr. Brina Ayala,  Dr. Jaycee Lewis asked me to see Cristal Tilley for a Palliative Care consult. Please see attached note. I look forward to working with Cristal Treva and assisting him with any Palliative Care needs he may have.     Have a good day,  Sean Saxena, REAGAN-BC  [place]

## (undated) NOTE — LETTER
2/14/2020          To Whom It May Concern:    Juni Rudd is currently under my medical care and may return to work on Monday, February 17, 2020. Activity is restricted as follows: No restrictions.   If you require additional information please contac

## (undated) NOTE — MR AVS SNAPSHOT
After Visit Summary   9/3/2019    Lorena Diaz    MRN: [id number]           Visit Information     Date & Time  9/3/2019  2:00 PM Provider  EM [facility] [address] Dept.  Phone  [phone]      Your Vit 9/10/2019 7:30 AM EM CC INFRN [address] - Infusion    9/12/2019 8:30 AM EM CC LAB4 [address] - Infusion                [facility] now offers Video Visits through [address] for adult and pediatric patients.   Video Visits Conditions needing urgent attention, but are not life-threatening. Average cost  $120*       EMERGENCY ROOM         Life-threatening emergencies needing immediate intervention   at a hospital emergency room.       Average cost  $2,300*   *Cost varie

## (undated) NOTE — MR AVS SNAPSHOT
After Visit Summary   9/17/2019    Karen Gill    MRN: [id number]           Visit Information     Date & Time  9/17/2019  8:30 AM Provider   [address] - Infusion Dept.  Phone  [phone]      Your V 10/9/2019 2:45 PM Yemi Aleman, [address] [facility]    10/9/2019 3:20 PM Yemi Aleman, [address] [place] [address]    10/14/2019 2:30 PM EM CC LAB1 [address] Average cost  $35*    e-VISITS  Average cost  $35*      [address]  Monday - Friday  10:00 am - 10:00 pm  Saturday - Sunday  10:00 am - 4:00 pm      [place]  Monday - Friday  4:00 pm

## (undated) NOTE — LETTER
4/15/2020          To Whom It May Concern:    Michael Ron is currently under my medical care and may return to work on 4- with the following restrictions for four weeks:  - no lifting greater than 15 pounds  - no pushing or pulling or other stren

## (undated) NOTE — MR AVS SNAPSHOT
After Visit Summary   6/3/2020    Evonne Chang    MRN: [id number]           Visit Information     Date & Time  6/3/2020  9:30 AM Provider  EM [facility] INFRN [address] Dept.  Phone  [phone]      Your Vit conditions such as allergies, colds, cough, fever, rash, sore throat, headache and pink eye. The cost for a Video Visit is currently $35.         If you receive a survey from [employer], please take a few minutes to complete it and provide feedback.  We s Conditions needing urgent attention, but are   non-life-threatening. Also available by appointment Average cost  $120*     EMERGENCY ROOM Life-threatening emergencies needing immediate intervention at a hospital emergency room.  Average cost  $2,300*   *

## (undated) NOTE — LETTER
[address] [place] [address]  Authorization for Surgical Operation or Procedure  Date: ______________       Time: _______________  1.  I hereby authorize Dr. Glenn SHIELDS , my physician and the assistant, to perform the following operati 5. I consent to the photographing of the operations or procedures to be performed for the purposes of advancing medicine, science, and/or education, provided my identity is not revealed.  If the procedure has been videotaped, the physician/surgeon will obta Statement of Physician: My signature below affirms that prior to the time of the procedure, I have explained to the patient and/or his guardian, the risks and benefits involved in the proposed treatment and any reasonable alternative to the proposed treatm

## (undated) NOTE — LETTER
[facility], [place]  Authorization for Invasive Procedures  1.  I hereby authorize Dr. Fernanda Escalante , my physician and whomever may be designated as the doctor's assistant, to perform the following operation and/or procedure:  Ultrasound 4. Should the need arise during my operation or immediate post-operative period; I also consent to the administration of blood and/or blood products.  Further, I understand that despite careful testing and screening of blood and blood products, I may still 9. Patients having a sterilization procedure: I understand that if the procedure is successful the results will be permanent and it will therefore be impossible for me to inseminate, conceive or bear children.  I also understand that the procedure is intend

## (undated) NOTE — MR AVS SNAPSHOT
After Visit Summary   8/20/2019    Ronnald Canavan    MRN: [id number]           Visit Information     Date & Time  8/20/2019 10:30 AM Provider  [facility] [address] Infusion Dept.  Phone  [phone]      Your V patients. Video Visits are available Monday - Friday for many common conditions such as allergies, colds, cough, fever, rash, sore throat, headache and pink eye.   The cost for a Video Visit is currently $35.         If you receive a survey from [employer] *Cost varies based on your insurance coverage  For more information about hours, locations or appointment options available at [facility],  visit: [email or web address] or call [phone]. ([phone])

## (undated) NOTE — LETTER
5/18/2020          To Whom It May Concern:    Tiago Alicea is currently under my medical care and may return to work on 5- with the following restrictions for four weeks:  - no lifting greater than 15 pounds  - no pushing or pulling or other stren

## (undated) NOTE — LETTER
9/12/2017          To Whom It May Concern:    Angelica Chiu is currently under my medical care. Please excuse the patient from work missed as he has had back pain. May return to work on Thursday, 9.14. 2017.    If you require additional information ry

## (undated) NOTE — MR AVS SNAPSHOT
After Visit Summary   10/15/2019    Erickson Sapp    MRN: [id number]           Visit Information     Date & Time  10/15/2019  8:30 AM Provider   [address] [facility] - Infusion Dept.  Phone  [phone]      Your 10/29/2019 9:30 AM EM CC INFRN [address] - Infusion    10/31/2019 11:00 AM EM CC [address] - Infusion    11/11/2019 9:45 AM EM CC [address] - Infusion    11/11/2019 10:30 AM Jayden Foote $35*      [address]  Monday - Friday  10:00 am - 10:00 pm  Saturday - Sunday  10:00 am - 4:00 pm      [employer]  Monday - Friday  4:00 pm - 10:00 pm  Saturday - Sunday  10:00 am - 4:00

## (undated) NOTE — Clinical Note
Dr. Chaparrita Reich,  Thank you for the consult! I will continue to f/u with Beto Ching.     Meredith Tellez

## (undated) NOTE — LETTER
Printed: 2019    Patient Name: Cony Al  : 1965   Medical Record #: [id number]    Consent to [address] [facility], understand that I have been diagnosed with adenocarcinoma stage IV).     I understand that the treatme questions have been answered to my satisfaction. I understand that I can contact my oncologist, Alejandrina Cisneros MD, or my Cancer Care Team at any time if I have questions, by calling [facility] at [phone].    Additional written information

## (undated) NOTE — LETTER
Printed: 2020    Patient Name: Tiago Alicea  : 1965   Medical Record #: [id number]    Consent to Cancer Treatment    I, Tiago Alicea, understand that I have been diagnosed with colon cancer (stage IV).     I understand that the treatmen questions have been answered to my satisfaction. I understand that I can contact my oncologist, Sherry Gunn MD, or my Cancer Care Team at any time if I have questions, by calling [phone].    Additional written information will be given to me prior

## (undated) NOTE — MR AVS SNAPSHOT
After Visit Summary   10/1/2019    Brent De La Cruz    MRN: [id number]           Visit Information     Date & Time  10/1/2019  8:30 AM Provider   [address] [facility] - Infusion Dept.  Phone  [phone]      Your V Medicine                [facility] now offers Video Visits through [address] for adult and pediatric patients. Video Visits are available Monday - Friday for many common conditions such as allergies, colds, cough, fever, rash, sore throat, headache and pink eye. Life-threatening emergencies needing immediate intervention   at a hospital emergency room.       Average cost  $2,300*   *Cost varies based on your insurance coverage  For more information about hours, locations or appointment options available at [facility],  vi

## (undated) NOTE — LETTER
[address] [place] [address]      Authorization for Surgical Operation and Procedure     Date:___________                                                                                                         Time:_______ 4.   Should the need arise during my operation or immediate post-operative period, I also consent to the administration of blood and/or blood products.   Further, I understand that despite careful testing and screening of blood or blood products by valencia 8.   I recognize that in the event my procedure results in extended X-Ray/fluoroscopy time, I may develop a skin reaction. 9.  If I have a Do Not Attempt Resuscitation (DNAR) order in place, that status will be suspended while in the operating room, proc STATEMENT OF PHYSICIAN My signature below affirms that prior to the time of the procedure; I have explained to the patient and/or his/her legal representative, the risks and benefits involved in the proposed treatment and any reasonable alternative to the

## (undated) NOTE — LETTER
Shin Carrizales Md  [address] [place] [address], [facility] [address]       06/07/19        Patient: Michael Ron   YOB: 1965   Date of Visit: 6/7/2019       Dear  Dr. Mena Vieira MD,      Thank you for referring Michael Ron to my practice.   Please

## (undated) NOTE — MR AVS SNAPSHOT
After Visit Summary   7/7/2020    Abida Rubio    MRN: [id number]           Visit Information     Date & Time  7/7/2020  8:30 AM Provider  [facility] 2 Department  [facility] - Infusion Dept.  Phone  [phone]      Your Vit 8/6/2020 9:30 AM EM CC LAB4 [facility] now offers Video Visits through [address] for adult and pediatric patients.   Video Visits are available Monday - Friday for many common conditions such as allergies, co Monday – Friday  10:00 am – 10:00 pm   Saturday – Sunday  10:00 am – 4:00 pm     [address]   Monday – Friday  4:00 pm – 10:00 pm   Saturday – Sunday  10:00 am – 4:00 pm  WALK-IN CARE  Emergency Medicine Providers  Conditions needing urgent attention, but

## (undated) NOTE — LETTER
2/4/2020    Ketty Coppola        [address] [place] [id number]            Dear Ketty Coppola,      Our records indicate that you are due for an appointment for a Colonoscopy in April 2020, or shortly there after, with Dewayne Alexandra MD.

## (undated) NOTE — MR AVS SNAPSHOT
After Visit Summary   11/25/2019    Karen Gill    MRN: [id number]           Visit Information     Date & Time  11/25/2019  9:30 AM Provider  EM CC INFRN [address] [place] [address] Infusion Dept.  Phone  [phone]      Your 2/20/2020 9:30 AM EM CC [address] - Infusion    4/29/2020 11:00 AM [facility]                [facility] now offers Video Visits through [address] for adult and pediatric patients.   Video Visits are avail [address]   Monday – Friday  10:00 am – 10:00 pm   Saturday – Sunday  10:00 am – 4:00 pm     [address]   Monday – Friday  10:00 am – 4:00 pm   Saturday – Sunday  10:00 am –

## (undated) NOTE — LETTER
6-            To Whom It May Concern:    Josephine Grewal, [birth year] has been under my care for a serious medical condition and surgery. He is now able to return to work and resume his full duties without restriction.     If you should h

## (undated) NOTE — LETTER
[facility]  [facility], [facility],[address]Th Floor  [address] [address]  [facility]: [phone]  FAX: [phone]    Medical Clearance Request  12-19-19    Madi Campbell MD  [address] [address]  VIA In Basket    The patient

## (undated) NOTE — LETTER
Printed: 10/22/2020    Patient Name: Abida Rubio  : 1965   Medical Record #: [id number]    Consent to Cancer Treatment    I, Abida Rubio, understand that I have been diagnosed with colon cancer (stage IV).     I understand that the treatme I have had the chance to ask questions about this treatment, and my questions have been answered to my satisfaction.   I understand that I can contact my oncologist, Ava Rios MD, or my Cancer Care Team at any time if I have questions, by calling [phone]

## (undated) NOTE — MR AVS SNAPSHOT
After Visit Summary   11/12/2019    Carissa Estrada    MRN: [id number]           Visit Information     Date & Time  11/12/2019  9:30 AM Provider  [facility] [address] [place], [place] [address].  Phone  [phone]      Your Future Appointments        Provider Department    11/22/2019 8:00 AM [facility]    11/22/2019 10:30 AM EM [facility] [address] - Infusion    11/22/2019 11:30 AM [facility] [address]  Monday - Friday  10:00 am - 10:00 pm  Saturday - Sunday  10:00 am - 4:00 pm      [address]  Monday - Friday  4:00 pm - 10:00 pm  Saturday - Sunday  10:00 am - 4:00 pm       Conditions needing urgent a